# Patient Record
Sex: MALE | Race: ASIAN | NOT HISPANIC OR LATINO | Employment: FULL TIME | URBAN - METROPOLITAN AREA
[De-identification: names, ages, dates, MRNs, and addresses within clinical notes are randomized per-mention and may not be internally consistent; named-entity substitution may affect disease eponyms.]

---

## 2018-05-17 ENCOUNTER — OFFICE VISIT - HEALTHEAST (OUTPATIENT)
Dept: FAMILY MEDICINE | Facility: CLINIC | Age: 33
End: 2018-05-17

## 2018-05-17 DIAGNOSIS — R81 GLUCOSURIA: ICD-10-CM

## 2018-05-17 DIAGNOSIS — R10.9 ABDOMINAL PAIN: ICD-10-CM

## 2018-05-17 DIAGNOSIS — N18.6 ESRD (END STAGE RENAL DISEASE) ON DIALYSIS (H): ICD-10-CM

## 2018-05-17 DIAGNOSIS — Z99.2 ESRD (END STAGE RENAL DISEASE) ON DIALYSIS (H): ICD-10-CM

## 2018-05-17 DIAGNOSIS — D48.5 NEOPLASM OF UNCERTAIN BEHAVIOR OF SKIN: ICD-10-CM

## 2018-05-17 LAB
ALBUMIN UR-MCNC: ABNORMAL MG/DL
APPEARANCE UR: CLEAR
BILIRUB UR QL STRIP: NEGATIVE
COLOR UR AUTO: YELLOW
ERYTHROCYTE [DISTWIDTH] IN BLOOD BY AUTOMATED COUNT: 14.6 % (ref 11–14.5)
GLUCOSE UR STRIP-MCNC: ABNORMAL MG/DL
HBA1C MFR BLD: 4.5 % (ref 3.5–6)
HCT VFR BLD AUTO: 42 % (ref 40–54)
HGB BLD-MCNC: 13.9 G/DL (ref 14–18)
HGB UR QL STRIP: ABNORMAL
KETONES UR STRIP-MCNC: NEGATIVE MG/DL
LDLC SERPL CALC-MCNC: 133 MG/DL
LEUKOCYTE ESTERASE UR QL STRIP: ABNORMAL
MCH RBC QN AUTO: 31.1 PG (ref 27–34)
MCHC RBC AUTO-ENTMCNC: 33 G/DL (ref 32–36)
MCV RBC AUTO: 94 FL (ref 80–100)
NITRATE UR QL: NEGATIVE
PH UR STRIP: >=9 [PH] (ref 5–8)
PLATELET # BLD AUTO: 218 THOU/UL (ref 140–440)
PMV BLD AUTO: 7 FL (ref 7–10)
RBC # BLD AUTO: 4.46 MILL/UL (ref 4.4–6.2)
SP GR UR STRIP: 1.01 (ref 1–1.03)
TSH SERPL DL<=0.005 MIU/L-ACNC: 2.3 UIU/ML (ref 0.3–5)
UROBILINOGEN UR STRIP-ACNC: ABNORMAL
WBC: 4.8 THOU/UL (ref 4–11)

## 2018-05-17 ASSESSMENT — MIFFLIN-ST. JEOR: SCORE: 1486.79

## 2018-05-18 ENCOUNTER — COMMUNICATION - HEALTHEAST (OUTPATIENT)
Dept: FAMILY MEDICINE | Facility: CLINIC | Age: 33
End: 2018-05-18

## 2018-05-18 LAB — BACTERIA SPEC CULT: NO GROWTH

## 2018-05-24 ENCOUNTER — AMBULATORY - HEALTHEAST (OUTPATIENT)
Dept: FAMILY MEDICINE | Facility: CLINIC | Age: 33
End: 2018-05-24

## 2018-05-24 DIAGNOSIS — D48.5 NEOPLASM OF UNCERTAIN BEHAVIOR OF SKIN OF FACE: ICD-10-CM

## 2018-05-24 DIAGNOSIS — R10.9 ABDOMINAL PAIN: ICD-10-CM

## 2018-05-24 ASSESSMENT — MIFFLIN-ST. JEOR: SCORE: 1489.03

## 2018-05-25 LAB
O+P STL MICRO: NORMAL

## 2018-05-28 LAB
H PYLORI AG STL QL IA: NORMAL
REPORT STATUS: NORMAL
SPECIMEN DESCRIPTION: NORMAL

## 2018-05-29 ENCOUNTER — COMMUNICATION - HEALTHEAST (OUTPATIENT)
Dept: FAMILY MEDICINE | Facility: CLINIC | Age: 33
End: 2018-05-29

## 2018-05-31 LAB
LAB AP CHARGES (HE HISTORICAL CONVERSION): NORMAL
PATH REPORT.COMMENTS IMP SPEC: NORMAL
PATH REPORT.COMMENTS IMP SPEC: NORMAL
PATH REPORT.FINAL DX SPEC: NORMAL
PATH REPORT.GROSS SPEC: NORMAL
PATH REPORT.MICROSCOPIC SPEC OTHER STN: NORMAL
PATH REPORT.RELEVANT HX SPEC: NORMAL
RESULT FLAG (HE HISTORICAL CONVERSION): NORMAL

## 2018-06-04 ENCOUNTER — COMMUNICATION - HEALTHEAST (OUTPATIENT)
Dept: FAMILY MEDICINE | Facility: CLINIC | Age: 33
End: 2018-06-04

## 2018-06-12 ENCOUNTER — RECORDS - HEALTHEAST (OUTPATIENT)
Dept: ADMINISTRATIVE | Facility: OTHER | Age: 33
End: 2018-06-12

## 2018-07-11 ENCOUNTER — RECORDS - HEALTHEAST (OUTPATIENT)
Dept: ADMINISTRATIVE | Facility: OTHER | Age: 33
End: 2018-07-11

## 2018-07-12 ENCOUNTER — HOSPITAL ENCOUNTER (OUTPATIENT)
Dept: INTERVENTIONAL RADIOLOGY/VASCULAR | Facility: HOSPITAL | Age: 33
Discharge: HOME | End: 2018-07-12
Attending: INTERNAL MEDICINE | Admitting: RADIOLOGY

## 2018-07-12 DIAGNOSIS — N18.9 CHRONIC RENAL FAILURE: ICD-10-CM

## 2018-07-12 LAB
HGB BLD-MCNC: 12.5 G/DL (ref 14–18)
PLATELET # BLD AUTO: NORMAL THOU/UL (ref 140–440)
POTASSIUM BLD-SCNC: 5.9 MMOL/L (ref 3.5–5)

## 2018-07-12 ASSESSMENT — MIFFLIN-ST. JEOR: SCORE: 1471.04

## 2018-07-31 ENCOUNTER — RECORDS - HEALTHEAST (OUTPATIENT)
Dept: ADMINISTRATIVE | Facility: OTHER | Age: 33
End: 2018-07-31

## 2018-09-12 ENCOUNTER — COMMUNICATION - HEALTHEAST (OUTPATIENT)
Dept: FAMILY MEDICINE | Facility: CLINIC | Age: 33
End: 2018-09-12

## 2018-09-21 ENCOUNTER — COMMUNICATION - HEALTHEAST (OUTPATIENT)
Dept: SCHEDULING | Facility: CLINIC | Age: 33
End: 2018-09-21

## 2018-09-21 ENCOUNTER — OFFICE VISIT - HEALTHEAST (OUTPATIENT)
Dept: FAMILY MEDICINE | Facility: CLINIC | Age: 33
End: 2018-09-21

## 2018-09-21 DIAGNOSIS — R14.0 BLOATING: ICD-10-CM

## 2018-09-21 DIAGNOSIS — R10.13 EPIGASTRIC PAIN: ICD-10-CM

## 2018-09-21 DIAGNOSIS — Z23 IMMUNIZATION DUE: ICD-10-CM

## 2018-09-21 DIAGNOSIS — R22.1 MASS OF NECK: ICD-10-CM

## 2018-09-21 DIAGNOSIS — Z99.2 ESRD (END STAGE RENAL DISEASE) ON DIALYSIS (H): ICD-10-CM

## 2018-09-21 DIAGNOSIS — N18.6 ESRD (END STAGE RENAL DISEASE) ON DIALYSIS (H): ICD-10-CM

## 2018-09-21 LAB
ANION GAP SERPL CALCULATED.3IONS-SCNC: 14 MMOL/L (ref 5–18)
BUN SERPL-MCNC: 28 MG/DL (ref 8–22)
CALCIUM SERPL-MCNC: 10.9 MG/DL (ref 8.5–10.5)
CHLORIDE BLD-SCNC: 98 MMOL/L (ref 98–107)
CO2 SERPL-SCNC: 25 MMOL/L (ref 22–31)
CREAT SERPL-MCNC: 12.22 MG/DL (ref 0.7–1.3)
GFR SERPL CREATININE-BSD FRML MDRD: 5 ML/MIN/1.73M2
GLUCOSE BLD-MCNC: 73 MG/DL (ref 70–125)
POTASSIUM BLD-SCNC: 5.3 MMOL/L (ref 3.5–5)
SODIUM SERPL-SCNC: 137 MMOL/L (ref 136–145)

## 2018-09-21 ASSESSMENT — MIFFLIN-ST. JEOR: SCORE: 1495.99

## 2019-02-19 ENCOUNTER — RECORDS - HEALTHEAST (OUTPATIENT)
Dept: ADMINISTRATIVE | Facility: OTHER | Age: 34
End: 2019-02-19

## 2019-02-21 ENCOUNTER — HOSPITAL ENCOUNTER (OUTPATIENT)
Dept: RADIOLOGY | Facility: HOSPITAL | Age: 34
Discharge: HOME | End: 2019-02-21
Attending: INTERNAL MEDICINE

## 2019-02-21 DIAGNOSIS — R04.2 HEMOPTYSIS: ICD-10-CM

## 2019-02-21 DIAGNOSIS — N18.6 END STAGE RENAL DISEASE (H): ICD-10-CM

## 2019-03-06 ENCOUNTER — OFFICE VISIT - HEALTHEAST (OUTPATIENT)
Dept: FAMILY MEDICINE | Facility: CLINIC | Age: 34
End: 2019-03-06

## 2019-03-06 DIAGNOSIS — F32.9 MAJOR DEPRESSIVE DISORDER WITH CURRENT ACTIVE EPISODE, UNSPECIFIED DEPRESSION EPISODE SEVERITY, UNSPECIFIED WHETHER RECURRENT: ICD-10-CM

## 2019-03-06 DIAGNOSIS — K59.01 SLOW TRANSIT CONSTIPATION: ICD-10-CM

## 2019-03-06 DIAGNOSIS — R14.0 BLOATING: ICD-10-CM

## 2019-03-06 ASSESSMENT — MIFFLIN-ST. JEOR: SCORE: 1485.78

## 2019-03-08 ENCOUNTER — COMMUNICATION - HEALTHEAST (OUTPATIENT)
Dept: FAMILY MEDICINE | Facility: CLINIC | Age: 34
End: 2019-03-08

## 2019-03-25 ENCOUNTER — OFFICE VISIT - HEALTHEAST (OUTPATIENT)
Dept: BEHAVIORAL HEALTH | Facility: CLINIC | Age: 34
End: 2019-03-25

## 2019-03-25 DIAGNOSIS — F32.9 MAJOR DEPRESSIVE DISORDER WITH CURRENT ACTIVE EPISODE, UNSPECIFIED DEPRESSION EPISODE SEVERITY, UNSPECIFIED WHETHER RECURRENT: ICD-10-CM

## 2019-03-25 DIAGNOSIS — F41.1 GAD (GENERALIZED ANXIETY DISORDER): ICD-10-CM

## 2019-04-08 ENCOUNTER — OFFICE VISIT - HEALTHEAST (OUTPATIENT)
Dept: BEHAVIORAL HEALTH | Facility: CLINIC | Age: 34
End: 2019-04-08

## 2019-04-08 DIAGNOSIS — F33.9 MAJOR DEPRESSIVE DISORDER, RECURRENT EPISODE (H): ICD-10-CM

## 2019-04-08 DIAGNOSIS — F41.1 GAD (GENERALIZED ANXIETY DISORDER): ICD-10-CM

## 2019-04-19 ENCOUNTER — RECORDS - HEALTHEAST (OUTPATIENT)
Dept: LAB | Facility: CLINIC | Age: 34
End: 2019-04-19

## 2019-04-19 LAB — POTASSIUM BLD-SCNC: 5.1 MMOL/L (ref 3.5–5)

## 2019-04-22 ENCOUNTER — OFFICE VISIT - HEALTHEAST (OUTPATIENT)
Dept: BEHAVIORAL HEALTH | Facility: CLINIC | Age: 34
End: 2019-04-22

## 2019-04-22 DIAGNOSIS — F33.2 SEVERE EPISODE OF RECURRENT MAJOR DEPRESSIVE DISORDER, WITHOUT PSYCHOTIC FEATURES (H): ICD-10-CM

## 2019-04-22 DIAGNOSIS — F33.9 MAJOR DEPRESSIVE DISORDER, RECURRENT EPISODE (H): ICD-10-CM

## 2019-04-25 ENCOUNTER — AMBULATORY - HEALTHEAST (OUTPATIENT)
Dept: CARE COORDINATION | Facility: CLINIC | Age: 34
End: 2019-04-25

## 2019-04-25 ENCOUNTER — COMMUNICATION - HEALTHEAST (OUTPATIENT)
Dept: NURSING | Facility: CLINIC | Age: 34
End: 2019-04-25

## 2019-04-25 DIAGNOSIS — Z99.2 ESRD (END STAGE RENAL DISEASE) ON DIALYSIS (H): ICD-10-CM

## 2019-04-25 DIAGNOSIS — N18.6 ESRD (END STAGE RENAL DISEASE) ON DIALYSIS (H): ICD-10-CM

## 2019-05-14 ENCOUNTER — AMBULATORY - HEALTHEAST (OUTPATIENT)
Dept: BEHAVIORAL HEALTH | Facility: CLINIC | Age: 34
End: 2019-05-14

## 2019-05-14 ENCOUNTER — OFFICE VISIT - HEALTHEAST (OUTPATIENT)
Dept: BEHAVIORAL HEALTH | Facility: CLINIC | Age: 34
End: 2019-05-14

## 2019-05-14 ENCOUNTER — AMBULATORY - HEALTHEAST (OUTPATIENT)
Dept: NURSING | Facility: CLINIC | Age: 34
End: 2019-05-14

## 2019-05-14 DIAGNOSIS — F33.2 SEVERE EPISODE OF RECURRENT MAJOR DEPRESSIVE DISORDER, WITHOUT PSYCHOTIC FEATURES (H): ICD-10-CM

## 2019-05-23 ENCOUNTER — COMMUNICATION - HEALTHEAST (OUTPATIENT)
Dept: NURSING | Facility: CLINIC | Age: 34
End: 2019-05-23

## 2019-05-24 ENCOUNTER — AMBULATORY - HEALTHEAST (OUTPATIENT)
Dept: NURSING | Facility: CLINIC | Age: 34
End: 2019-05-24

## 2019-05-28 ENCOUNTER — OFFICE VISIT - HEALTHEAST (OUTPATIENT)
Dept: BEHAVIORAL HEALTH | Facility: CLINIC | Age: 34
End: 2019-05-28

## 2019-05-28 DIAGNOSIS — F33.9 MAJOR DEPRESSIVE DISORDER, RECURRENT EPISODE (H): ICD-10-CM

## 2019-05-28 DIAGNOSIS — F33.0 MILD EPISODE OF RECURRENT MAJOR DEPRESSIVE DISORDER (H): ICD-10-CM

## 2019-05-29 ENCOUNTER — COMMUNICATION - HEALTHEAST (OUTPATIENT)
Dept: NURSING | Facility: CLINIC | Age: 34
End: 2019-05-29

## 2019-06-18 ENCOUNTER — AMBULATORY - HEALTHEAST (OUTPATIENT)
Dept: NURSING | Facility: CLINIC | Age: 34
End: 2019-06-18

## 2019-06-24 ENCOUNTER — COMMUNICATION - HEALTHEAST (OUTPATIENT)
Dept: NURSING | Facility: CLINIC | Age: 34
End: 2019-06-24

## 2019-07-02 ENCOUNTER — COMMUNICATION - HEALTHEAST (OUTPATIENT)
Dept: NURSING | Facility: CLINIC | Age: 34
End: 2019-07-02

## 2019-07-11 ENCOUNTER — COMMUNICATION - HEALTHEAST (OUTPATIENT)
Dept: NURSING | Facility: CLINIC | Age: 34
End: 2019-07-11

## 2019-08-08 ENCOUNTER — COMMUNICATION - HEALTHEAST (OUTPATIENT)
Dept: NURSING | Facility: CLINIC | Age: 34
End: 2019-08-08

## 2019-08-09 ENCOUNTER — COMMUNICATION - HEALTHEAST (OUTPATIENT)
Dept: NURSING | Facility: CLINIC | Age: 34
End: 2019-08-09

## 2019-08-15 ENCOUNTER — RECORDS - HEALTHEAST (OUTPATIENT)
Dept: GENERAL RADIOLOGY | Facility: CLINIC | Age: 34
End: 2019-08-15

## 2019-08-15 ENCOUNTER — OFFICE VISIT - HEALTHEAST (OUTPATIENT)
Dept: FAMILY MEDICINE | Facility: CLINIC | Age: 34
End: 2019-08-15

## 2019-08-15 DIAGNOSIS — Z99.2 ESRD (END STAGE RENAL DISEASE) ON DIALYSIS (H): ICD-10-CM

## 2019-08-15 DIAGNOSIS — R10.32 LLQ ABDOMINAL PAIN: ICD-10-CM

## 2019-08-15 DIAGNOSIS — R10.13 EPIGASTRIC PAIN: ICD-10-CM

## 2019-08-15 DIAGNOSIS — Z75.8 LANGUAGE BARRIER AFFECTING HEALTH CARE: ICD-10-CM

## 2019-08-15 DIAGNOSIS — K59.01 SLOW TRANSIT CONSTIPATION: ICD-10-CM

## 2019-08-15 DIAGNOSIS — I10 ESSENTIAL HYPERTENSION: ICD-10-CM

## 2019-08-15 DIAGNOSIS — R10.32 LEFT LOWER QUADRANT PAIN: ICD-10-CM

## 2019-08-15 DIAGNOSIS — Z79.899 POLYPHARMACY: ICD-10-CM

## 2019-08-15 DIAGNOSIS — R61 NIGHT SWEATS: ICD-10-CM

## 2019-08-15 DIAGNOSIS — K21.9 GASTROESOPHAGEAL REFLUX DISEASE WITHOUT ESOPHAGITIS: ICD-10-CM

## 2019-08-15 DIAGNOSIS — N18.6 ESRD (END STAGE RENAL DISEASE) ON DIALYSIS (H): ICD-10-CM

## 2019-08-15 DIAGNOSIS — R61 GENERALIZED HYPERHIDROSIS: ICD-10-CM

## 2019-08-15 DIAGNOSIS — R04.2 COUGH WITH HEMOPTYSIS: ICD-10-CM

## 2019-08-15 DIAGNOSIS — R04.2 HEMOPTYSIS: ICD-10-CM

## 2019-08-15 DIAGNOSIS — Z60.3 LANGUAGE BARRIER AFFECTING HEALTH CARE: ICD-10-CM

## 2019-08-15 LAB
BASOPHILS # BLD AUTO: 0 THOU/UL (ref 0–0.2)
BASOPHILS NFR BLD AUTO: 0 % (ref 0–2)
EOSINOPHIL # BLD AUTO: 0.2 THOU/UL (ref 0–0.4)
EOSINOPHIL NFR BLD AUTO: 4 % (ref 0–6)
ERYTHROCYTE [DISTWIDTH] IN BLOOD BY AUTOMATED COUNT: 13.8 % (ref 11–14.5)
HCT VFR BLD AUTO: 34 % (ref 40–54)
HGB BLD-MCNC: 11.5 G/DL (ref 14–18)
LYMPHOCYTES # BLD AUTO: 1.6 THOU/UL (ref 0.8–4.4)
LYMPHOCYTES NFR BLD AUTO: 28 % (ref 20–40)
MCH RBC QN AUTO: 30 PG (ref 27–34)
MCHC RBC AUTO-ENTMCNC: 33.8 G/DL (ref 32–36)
MCV RBC AUTO: 89 FL (ref 80–100)
MONOCYTES # BLD AUTO: 0.3 THOU/UL (ref 0–0.9)
MONOCYTES NFR BLD AUTO: 5 % (ref 2–10)
NEUTROPHILS # BLD AUTO: 3.5 THOU/UL (ref 2–7.7)
NEUTROPHILS NFR BLD AUTO: 63 % (ref 50–70)
PLATELET # BLD AUTO: 139 THOU/UL (ref 140–440)
PMV BLD AUTO: 7 FL (ref 7–10)
RBC # BLD AUTO: 3.84 MILL/UL (ref 4.4–6.2)
WBC: 5.5 THOU/UL (ref 4–11)

## 2019-08-15 SDOH — SOCIAL STABILITY - SOCIAL INSECURITY: ACCULTURATION DIFFICULTY: Z60.3

## 2019-08-15 ASSESSMENT — MIFFLIN-ST. JEOR: SCORE: 1445.77

## 2019-08-19 LAB
GAMMA INTERFERON BACKGROUND BLD IA-ACNC: 0.13 IU/ML
M TB IFN-G BLD-IMP: NEGATIVE
MITOGEN IGNF BCKGRD COR BLD-ACNC: -0.03 IU/ML
MITOGEN IGNF BCKGRD COR BLD-ACNC: 0 IU/ML
QTF INTERPRETATION: NORMAL
QTF MITOGEN - NIL: 9.19 IU/ML

## 2019-08-21 ENCOUNTER — COMMUNICATION - HEALTHEAST (OUTPATIENT)
Dept: CARE COORDINATION | Facility: CLINIC | Age: 34
End: 2019-08-21

## 2019-08-22 ENCOUNTER — COMMUNICATION - HEALTHEAST (OUTPATIENT)
Dept: CARE COORDINATION | Facility: CLINIC | Age: 34
End: 2019-08-22

## 2019-08-22 ENCOUNTER — COMMUNICATION - HEALTHEAST (OUTPATIENT)
Dept: NURSING | Facility: CLINIC | Age: 34
End: 2019-08-22

## 2019-08-23 ENCOUNTER — COMMUNICATION - HEALTHEAST (OUTPATIENT)
Dept: NURSING | Facility: CLINIC | Age: 34
End: 2019-08-23

## 2019-08-28 ENCOUNTER — RECORDS - HEALTHEAST (OUTPATIENT)
Dept: ADMINISTRATIVE | Facility: OTHER | Age: 34
End: 2019-08-28

## 2019-08-29 ENCOUNTER — OFFICE VISIT - HEALTHEAST (OUTPATIENT)
Dept: FAMILY MEDICINE | Facility: CLINIC | Age: 34
End: 2019-08-29

## 2019-08-29 ENCOUNTER — COMMUNICATION - HEALTHEAST (OUTPATIENT)
Dept: NURSING | Facility: CLINIC | Age: 34
End: 2019-08-29

## 2019-08-29 DIAGNOSIS — I16.0 HYPERTENSIVE URGENCY: ICD-10-CM

## 2019-08-29 DIAGNOSIS — I51.7 LVH (LEFT VENTRICULAR HYPERTROPHY): ICD-10-CM

## 2019-08-29 DIAGNOSIS — K21.9 GASTROESOPHAGEAL REFLUX DISEASE WITHOUT ESOPHAGITIS: ICD-10-CM

## 2019-08-29 DIAGNOSIS — K59.01 SLOW TRANSIT CONSTIPATION: ICD-10-CM

## 2019-08-29 DIAGNOSIS — Z99.2 ESRD (END STAGE RENAL DISEASE) ON DIALYSIS (H): ICD-10-CM

## 2019-08-29 DIAGNOSIS — N18.6 ESRD (END STAGE RENAL DISEASE) ON DIALYSIS (H): ICD-10-CM

## 2019-08-29 ASSESSMENT — MIFFLIN-ST. JEOR: SCORE: 1437.02

## 2019-09-04 ENCOUNTER — AMBULATORY - HEALTHEAST (OUTPATIENT)
Dept: NURSING | Facility: CLINIC | Age: 34
End: 2019-09-04

## 2019-09-04 ENCOUNTER — AMBULATORY - HEALTHEAST (OUTPATIENT)
Dept: FAMILY MEDICINE | Facility: CLINIC | Age: 34
End: 2019-09-04

## 2019-09-04 ENCOUNTER — COMMUNICATION - HEALTHEAST (OUTPATIENT)
Dept: NURSING | Facility: CLINIC | Age: 34
End: 2019-09-04

## 2019-09-04 DIAGNOSIS — K21.9 GASTROESOPHAGEAL REFLUX DISEASE, ESOPHAGITIS PRESENCE NOT SPECIFIED: ICD-10-CM

## 2019-09-04 RX ORDER — CALCIUM CARBONATE 500 MG/1
2 TABLET, CHEWABLE ORAL 2 TIMES DAILY PRN
Qty: 120 TABLET | Refills: 11 | Status: SHIPPED | OUTPATIENT
Start: 2019-09-04

## 2019-09-04 ASSESSMENT — ACTIVITIES OF DAILY LIVING (ADL): DEPENDENT_IADLS:: INDEPENDENT

## 2019-09-05 ENCOUNTER — COMMUNICATION - HEALTHEAST (OUTPATIENT)
Dept: NURSING | Facility: CLINIC | Age: 34
End: 2019-09-05

## 2019-09-10 ENCOUNTER — AMBULATORY - HEALTHEAST (OUTPATIENT)
Dept: NURSING | Facility: CLINIC | Age: 34
End: 2019-09-10

## 2019-09-13 ENCOUNTER — COMMUNICATION - HEALTHEAST (OUTPATIENT)
Dept: CARE COORDINATION | Facility: CLINIC | Age: 34
End: 2019-09-13

## 2019-09-13 ENCOUNTER — HOME CARE/HOSPICE - HEALTHEAST (OUTPATIENT)
Dept: HOME HEALTH SERVICES | Facility: HOME HEALTH | Age: 34
End: 2019-09-13

## 2019-09-13 DIAGNOSIS — N18.6 ESRD (END STAGE RENAL DISEASE) ON DIALYSIS (H): ICD-10-CM

## 2019-09-13 DIAGNOSIS — I10 ESSENTIAL HYPERTENSION: ICD-10-CM

## 2019-09-13 DIAGNOSIS — Z99.2 ESRD (END STAGE RENAL DISEASE) ON DIALYSIS (H): ICD-10-CM

## 2019-09-13 DIAGNOSIS — I16.0 HYPERTENSIVE URGENCY: ICD-10-CM

## 2019-09-17 ENCOUNTER — COMMUNICATION - HEALTHEAST (OUTPATIENT)
Dept: HOME HEALTH SERVICES | Facility: HOME HEALTH | Age: 34
End: 2019-09-17

## 2019-09-17 ENCOUNTER — HOME CARE/HOSPICE - HEALTHEAST (OUTPATIENT)
Dept: HOME HEALTH SERVICES | Facility: HOME HEALTH | Age: 34
End: 2019-09-17

## 2019-09-17 ENCOUNTER — AMBULATORY - HEALTHEAST (OUTPATIENT)
Dept: FAMILY MEDICINE | Facility: CLINIC | Age: 34
End: 2019-09-17

## 2019-09-20 ENCOUNTER — HOME CARE/HOSPICE - HEALTHEAST (OUTPATIENT)
Dept: HOME HEALTH SERVICES | Facility: HOME HEALTH | Age: 34
End: 2019-09-20

## 2019-09-24 ENCOUNTER — AMBULATORY - HEALTHEAST (OUTPATIENT)
Dept: CARE COORDINATION | Facility: CLINIC | Age: 34
End: 2019-09-24

## 2019-09-24 ENCOUNTER — HOME CARE/HOSPICE - HEALTHEAST (OUTPATIENT)
Dept: HOME HEALTH SERVICES | Facility: HOME HEALTH | Age: 34
End: 2019-09-24

## 2019-09-24 DIAGNOSIS — Z71.89 COUNSELING AND COORDINATION OF CARE: ICD-10-CM

## 2019-09-27 ENCOUNTER — HOME CARE/HOSPICE - HEALTHEAST (OUTPATIENT)
Dept: HOME HEALTH SERVICES | Facility: HOME HEALTH | Age: 34
End: 2019-09-27

## 2019-10-01 ENCOUNTER — COMMUNICATION - HEALTHEAST (OUTPATIENT)
Dept: NURSING | Facility: CLINIC | Age: 34
End: 2019-10-01

## 2019-10-02 ENCOUNTER — COMMUNICATION - HEALTHEAST (OUTPATIENT)
Dept: NURSING | Facility: CLINIC | Age: 34
End: 2019-10-02

## 2019-10-04 ENCOUNTER — HOME CARE/HOSPICE - HEALTHEAST (OUTPATIENT)
Dept: HOME HEALTH SERVICES | Facility: HOME HEALTH | Age: 34
End: 2019-10-04

## 2019-10-09 ENCOUNTER — COMMUNICATION - HEALTHEAST (OUTPATIENT)
Dept: NURSING | Facility: CLINIC | Age: 34
End: 2019-10-09

## 2019-10-11 ENCOUNTER — HOME CARE/HOSPICE - HEALTHEAST (OUTPATIENT)
Dept: HOME HEALTH SERVICES | Facility: HOME HEALTH | Age: 34
End: 2019-10-11

## 2019-10-14 ENCOUNTER — AMBULATORY - HEALTHEAST (OUTPATIENT)
Dept: NURSING | Facility: CLINIC | Age: 34
End: 2019-10-14

## 2019-10-18 ENCOUNTER — HOME CARE/HOSPICE - HEALTHEAST (OUTPATIENT)
Dept: HOME HEALTH SERVICES | Facility: HOME HEALTH | Age: 34
End: 2019-10-18

## 2019-10-22 ENCOUNTER — HOME CARE/HOSPICE - HEALTHEAST (OUTPATIENT)
Dept: HOME HEALTH SERVICES | Facility: HOME HEALTH | Age: 34
End: 2019-10-22

## 2019-10-22 ENCOUNTER — COMMUNICATION - HEALTHEAST (OUTPATIENT)
Dept: HOME HEALTH SERVICES | Facility: HOME HEALTH | Age: 34
End: 2019-10-22

## 2019-10-28 ENCOUNTER — OFFICE VISIT - HEALTHEAST (OUTPATIENT)
Dept: FAMILY MEDICINE | Facility: CLINIC | Age: 34
End: 2019-10-28

## 2019-10-28 DIAGNOSIS — Z02.71 DISABILITY EXAMINATION: ICD-10-CM

## 2019-10-28 DIAGNOSIS — Z13.31 SCREENING FOR DEPRESSION: ICD-10-CM

## 2019-10-28 ASSESSMENT — PATIENT HEALTH QUESTIONNAIRE - PHQ9: SUM OF ALL RESPONSES TO PHQ QUESTIONS 1-9: 4

## 2019-10-28 ASSESSMENT — MIFFLIN-ST. JEOR: SCORE: 1448.36

## 2019-10-29 ENCOUNTER — HOME CARE/HOSPICE - HEALTHEAST (OUTPATIENT)
Dept: HOME HEALTH SERVICES | Facility: HOME HEALTH | Age: 34
End: 2019-10-29

## 2019-11-01 ENCOUNTER — COMMUNICATION - HEALTHEAST (OUTPATIENT)
Dept: NURSING | Facility: CLINIC | Age: 34
End: 2019-11-01

## 2019-11-05 ENCOUNTER — COMMUNICATION - HEALTHEAST (OUTPATIENT)
Dept: NURSING | Facility: CLINIC | Age: 34
End: 2019-11-05

## 2019-11-05 ENCOUNTER — HOME CARE/HOSPICE - HEALTHEAST (OUTPATIENT)
Dept: HOME HEALTH SERVICES | Facility: HOME HEALTH | Age: 34
End: 2019-11-05

## 2019-11-12 ENCOUNTER — HOME CARE/HOSPICE - HEALTHEAST (OUTPATIENT)
Dept: HOME HEALTH SERVICES | Facility: HOME HEALTH | Age: 34
End: 2019-11-12

## 2019-11-13 ENCOUNTER — HOME CARE/HOSPICE - HEALTHEAST (OUTPATIENT)
Dept: HOME HEALTH SERVICES | Facility: HOME HEALTH | Age: 34
End: 2019-11-13

## 2019-11-13 ENCOUNTER — COMMUNICATION - HEALTHEAST (OUTPATIENT)
Dept: HOME HEALTH SERVICES | Facility: HOME HEALTH | Age: 34
End: 2019-11-13

## 2019-11-14 ENCOUNTER — AMBULATORY - HEALTHEAST (OUTPATIENT)
Dept: FAMILY MEDICINE | Facility: CLINIC | Age: 34
End: 2019-11-14

## 2019-11-14 ENCOUNTER — HOME CARE/HOSPICE - HEALTHEAST (OUTPATIENT)
Dept: HOME HEALTH SERVICES | Facility: HOME HEALTH | Age: 34
End: 2019-11-14

## 2019-11-19 ENCOUNTER — HOME CARE/HOSPICE - HEALTHEAST (OUTPATIENT)
Dept: HOME HEALTH SERVICES | Facility: HOME HEALTH | Age: 34
End: 2019-11-19

## 2019-11-21 ENCOUNTER — COMMUNICATION - HEALTHEAST (OUTPATIENT)
Dept: FAMILY MEDICINE | Facility: CLINIC | Age: 34
End: 2019-11-21

## 2019-11-22 ENCOUNTER — COMMUNICATION - HEALTHEAST (OUTPATIENT)
Dept: NURSING | Facility: CLINIC | Age: 34
End: 2019-11-22

## 2019-11-22 ENCOUNTER — HOME CARE/HOSPICE - HEALTHEAST (OUTPATIENT)
Dept: HOME HEALTH SERVICES | Facility: HOME HEALTH | Age: 34
End: 2019-11-22

## 2019-11-25 ENCOUNTER — AMBULATORY - HEALTHEAST (OUTPATIENT)
Dept: NURSING | Facility: CLINIC | Age: 34
End: 2019-11-25

## 2019-11-25 ENCOUNTER — COMMUNICATION - HEALTHEAST (OUTPATIENT)
Dept: CARE COORDINATION | Facility: CLINIC | Age: 34
End: 2019-11-25

## 2019-11-26 ENCOUNTER — COMMUNICATION - HEALTHEAST (OUTPATIENT)
Dept: CARE COORDINATION | Facility: CLINIC | Age: 34
End: 2019-11-26

## 2019-11-26 ENCOUNTER — HOME CARE/HOSPICE - HEALTHEAST (OUTPATIENT)
Dept: HOME HEALTH SERVICES | Facility: HOME HEALTH | Age: 34
End: 2019-11-26

## 2019-11-27 ENCOUNTER — COMMUNICATION - HEALTHEAST (OUTPATIENT)
Dept: FAMILY MEDICINE | Facility: CLINIC | Age: 34
End: 2019-11-27

## 2019-12-02 ENCOUNTER — HOME CARE/HOSPICE - HEALTHEAST (OUTPATIENT)
Dept: HOME HEALTH SERVICES | Facility: HOME HEALTH | Age: 34
End: 2019-12-02

## 2019-12-03 ENCOUNTER — HOME CARE/HOSPICE - HEALTHEAST (OUTPATIENT)
Dept: HOME HEALTH SERVICES | Facility: HOME HEALTH | Age: 34
End: 2019-12-03

## 2019-12-03 ENCOUNTER — AMBULATORY - HEALTHEAST (OUTPATIENT)
Dept: FAMILY MEDICINE | Facility: CLINIC | Age: 34
End: 2019-12-03

## 2019-12-03 DIAGNOSIS — K21.9 GASTROESOPHAGEAL REFLUX DISEASE, ESOPHAGITIS PRESENCE NOT SPECIFIED: ICD-10-CM

## 2019-12-10 ENCOUNTER — HOME CARE/HOSPICE - HEALTHEAST (OUTPATIENT)
Dept: HOME HEALTH SERVICES | Facility: HOME HEALTH | Age: 34
End: 2019-12-10

## 2019-12-16 ENCOUNTER — AMBULATORY - HEALTHEAST (OUTPATIENT)
Dept: NURSING | Facility: CLINIC | Age: 34
End: 2019-12-16

## 2019-12-17 ENCOUNTER — HOME CARE/HOSPICE - HEALTHEAST (OUTPATIENT)
Dept: HOME HEALTH SERVICES | Facility: HOME HEALTH | Age: 34
End: 2019-12-17

## 2019-12-24 ENCOUNTER — HOME CARE/HOSPICE - HEALTHEAST (OUTPATIENT)
Dept: HOME HEALTH SERVICES | Facility: HOME HEALTH | Age: 34
End: 2019-12-24

## 2019-12-27 ENCOUNTER — COMMUNICATION - HEALTHEAST (OUTPATIENT)
Dept: NURSING | Facility: CLINIC | Age: 34
End: 2019-12-27

## 2019-12-31 ENCOUNTER — HOME CARE/HOSPICE - HEALTHEAST (OUTPATIENT)
Dept: HOME HEALTH SERVICES | Facility: HOME HEALTH | Age: 34
End: 2019-12-31

## 2020-01-02 ENCOUNTER — HOME CARE/HOSPICE - HEALTHEAST (OUTPATIENT)
Dept: HOME HEALTH SERVICES | Facility: HOME HEALTH | Age: 35
End: 2020-01-02

## 2020-01-07 ENCOUNTER — HOME CARE/HOSPICE - HEALTHEAST (OUTPATIENT)
Dept: HOME HEALTH SERVICES | Facility: HOME HEALTH | Age: 35
End: 2020-01-07

## 2020-01-14 ENCOUNTER — HOME CARE/HOSPICE - HEALTHEAST (OUTPATIENT)
Dept: HOME HEALTH SERVICES | Facility: HOME HEALTH | Age: 35
End: 2020-01-14

## 2020-01-22 ENCOUNTER — AMBULATORY - HEALTHEAST (OUTPATIENT)
Dept: BEHAVIORAL HEALTH | Facility: CLINIC | Age: 35
End: 2020-01-22

## 2020-01-27 ENCOUNTER — COMMUNICATION - HEALTHEAST (OUTPATIENT)
Dept: NURSING | Facility: CLINIC | Age: 35
End: 2020-01-27

## 2020-01-28 ENCOUNTER — COMMUNICATION - HEALTHEAST (OUTPATIENT)
Dept: HOME HEALTH SERVICES | Facility: HOME HEALTH | Age: 35
End: 2020-01-28

## 2020-01-30 ENCOUNTER — HOME CARE/HOSPICE - HEALTHEAST (OUTPATIENT)
Dept: HOME HEALTH SERVICES | Facility: HOME HEALTH | Age: 35
End: 2020-01-30

## 2020-01-30 ENCOUNTER — COMMUNICATION - HEALTHEAST (OUTPATIENT)
Dept: HOME HEALTH SERVICES | Facility: HOME HEALTH | Age: 35
End: 2020-01-30

## 2020-02-03 ENCOUNTER — COMMUNICATION - HEALTHEAST (OUTPATIENT)
Dept: NURSING | Facility: CLINIC | Age: 35
End: 2020-02-03

## 2020-02-03 ENCOUNTER — AMBULATORY - HEALTHEAST (OUTPATIENT)
Dept: MULTI SPECIALTY CLINIC | Facility: CLINIC | Age: 35
End: 2020-02-03

## 2020-02-03 ENCOUNTER — HOME CARE/HOSPICE - HEALTHEAST (OUTPATIENT)
Dept: HOME HEALTH SERVICES | Facility: HOME HEALTH | Age: 35
End: 2020-02-03

## 2020-02-03 DIAGNOSIS — I10 ESSENTIAL HYPERTENSION: ICD-10-CM

## 2020-02-05 ENCOUNTER — HOME CARE/HOSPICE - HEALTHEAST (OUTPATIENT)
Dept: HOME HEALTH SERVICES | Facility: HOME HEALTH | Age: 35
End: 2020-02-05

## 2020-02-13 ENCOUNTER — HOME CARE/HOSPICE - HEALTHEAST (OUTPATIENT)
Dept: HOME HEALTH SERVICES | Facility: HOME HEALTH | Age: 35
End: 2020-02-13

## 2020-02-17 ENCOUNTER — HOME CARE/HOSPICE - HEALTHEAST (OUTPATIENT)
Dept: HOME HEALTH SERVICES | Facility: HOME HEALTH | Age: 35
End: 2020-02-17

## 2020-02-18 ENCOUNTER — HOME CARE/HOSPICE - HEALTHEAST (OUTPATIENT)
Dept: HOME HEALTH SERVICES | Facility: HOME HEALTH | Age: 35
End: 2020-02-18

## 2020-02-19 ENCOUNTER — RECORDS - HEALTHEAST (OUTPATIENT)
Dept: ADMINISTRATIVE | Facility: OTHER | Age: 35
End: 2020-02-19

## 2020-02-20 ENCOUNTER — HOME CARE/HOSPICE - HEALTHEAST (OUTPATIENT)
Dept: HOME HEALTH SERVICES | Facility: HOME HEALTH | Age: 35
End: 2020-02-20

## 2020-02-25 ENCOUNTER — HOSPITAL ENCOUNTER (OUTPATIENT)
Dept: ULTRASOUND IMAGING | Facility: CLINIC | Age: 35
Discharge: HOME | End: 2020-02-25
Attending: INTERNAL MEDICINE

## 2020-02-25 DIAGNOSIS — N18.6 ESRD (END STAGE RENAL DISEASE) (H): ICD-10-CM

## 2020-02-27 ENCOUNTER — HOME CARE/HOSPICE - HEALTHEAST (OUTPATIENT)
Dept: HOME HEALTH SERVICES | Facility: HOME HEALTH | Age: 35
End: 2020-02-27

## 2020-03-02 ENCOUNTER — COMMUNICATION - HEALTHEAST (OUTPATIENT)
Dept: NURSING | Facility: CLINIC | Age: 35
End: 2020-03-02

## 2020-03-05 ENCOUNTER — HOME CARE/HOSPICE - HEALTHEAST (OUTPATIENT)
Dept: HOME HEALTH SERVICES | Facility: HOME HEALTH | Age: 35
End: 2020-03-05

## 2020-03-05 ENCOUNTER — COMMUNICATION - HEALTHEAST (OUTPATIENT)
Dept: CARE COORDINATION | Facility: CLINIC | Age: 35
End: 2020-03-05

## 2020-03-12 ENCOUNTER — HOME CARE/HOSPICE - HEALTHEAST (OUTPATIENT)
Dept: HOME HEALTH SERVICES | Facility: HOME HEALTH | Age: 35
End: 2020-03-12

## 2020-03-18 ENCOUNTER — RECORDS - HEALTHEAST (OUTPATIENT)
Dept: ADMINISTRATIVE | Facility: OTHER | Age: 35
End: 2020-03-18

## 2020-03-19 ENCOUNTER — RECORDS - HEALTHEAST (OUTPATIENT)
Dept: ADMINISTRATIVE | Facility: OTHER | Age: 35
End: 2020-03-19

## 2020-03-23 ENCOUNTER — RECORDS - HEALTHEAST (OUTPATIENT)
Dept: ADMINISTRATIVE | Facility: OTHER | Age: 35
End: 2020-03-23

## 2020-03-23 ENCOUNTER — ANESTHESIA - HEALTHEAST (OUTPATIENT)
Dept: SURGERY | Facility: CLINIC | Age: 35
End: 2020-03-23

## 2020-03-24 ENCOUNTER — SURGERY - HEALTHEAST (OUTPATIENT)
Dept: SURGERY | Facility: CLINIC | Age: 35
End: 2020-03-24

## 2020-03-25 ENCOUNTER — ANESTHESIA - HEALTHEAST (OUTPATIENT)
Dept: SURGERY | Facility: CLINIC | Age: 35
End: 2020-03-25

## 2020-03-26 ENCOUNTER — HOME CARE/HOSPICE - HEALTHEAST (OUTPATIENT)
Dept: HOME HEALTH SERVICES | Facility: HOME HEALTH | Age: 35
End: 2020-03-26

## 2020-03-26 ENCOUNTER — SURGERY - HEALTHEAST (OUTPATIENT)
Dept: SURGERY | Facility: CLINIC | Age: 35
End: 2020-03-26

## 2020-03-26 ASSESSMENT — MIFFLIN-ST. JEOR: SCORE: 1408.1

## 2020-03-27 ENCOUNTER — HOME CARE/HOSPICE - HEALTHEAST (OUTPATIENT)
Dept: HOME HEALTH SERVICES | Facility: HOME HEALTH | Age: 35
End: 2020-03-27

## 2020-03-31 ENCOUNTER — COMMUNICATION - HEALTHEAST (OUTPATIENT)
Dept: NURSING | Facility: CLINIC | Age: 35
End: 2020-03-31

## 2020-04-01 ENCOUNTER — COMMUNICATION - HEALTHEAST (OUTPATIENT)
Dept: NURSING | Facility: CLINIC | Age: 35
End: 2020-04-01

## 2020-04-09 ENCOUNTER — HOME CARE/HOSPICE - HEALTHEAST (OUTPATIENT)
Dept: HOME HEALTH SERVICES | Facility: HOME HEALTH | Age: 35
End: 2020-04-09

## 2020-04-13 ENCOUNTER — HOME CARE/HOSPICE - HEALTHEAST (OUTPATIENT)
Dept: HOME HEALTH SERVICES | Facility: HOME HEALTH | Age: 35
End: 2020-04-13

## 2020-04-13 ENCOUNTER — AMBULATORY - HEALTHEAST (OUTPATIENT)
Dept: SURGERY | Facility: CLINIC | Age: 35
End: 2020-04-13

## 2020-04-13 ENCOUNTER — COMMUNICATION - HEALTHEAST (OUTPATIENT)
Dept: NURSING | Facility: CLINIC | Age: 35
End: 2020-04-13

## 2020-04-20 ENCOUNTER — COMMUNICATION - HEALTHEAST (OUTPATIENT)
Dept: NURSING | Facility: CLINIC | Age: 35
End: 2020-04-20

## 2020-04-23 ENCOUNTER — HOME CARE/HOSPICE - HEALTHEAST (OUTPATIENT)
Dept: HOME HEALTH SERVICES | Facility: HOME HEALTH | Age: 35
End: 2020-04-23

## 2020-05-01 ENCOUNTER — COMMUNICATION - HEALTHEAST (OUTPATIENT)
Dept: NURSING | Facility: CLINIC | Age: 35
End: 2020-05-01

## 2020-05-05 ENCOUNTER — HOME CARE/HOSPICE - HEALTHEAST (OUTPATIENT)
Dept: HOME HEALTH SERVICES | Facility: HOME HEALTH | Age: 35
End: 2020-05-05

## 2020-05-05 ENCOUNTER — COMMUNICATION - HEALTHEAST (OUTPATIENT)
Dept: HOME HEALTH SERVICES | Facility: HOME HEALTH | Age: 35
End: 2020-05-05

## 2020-05-07 ENCOUNTER — HOME CARE/HOSPICE - HEALTHEAST (OUTPATIENT)
Dept: HOME HEALTH SERVICES | Facility: HOME HEALTH | Age: 35
End: 2020-05-07

## 2020-05-14 ENCOUNTER — COMMUNICATION - HEALTHEAST (OUTPATIENT)
Dept: HOME HEALTH SERVICES | Facility: HOME HEALTH | Age: 35
End: 2020-05-14

## 2020-05-14 ENCOUNTER — HOME CARE/HOSPICE - HEALTHEAST (OUTPATIENT)
Dept: HOME HEALTH SERVICES | Facility: HOME HEALTH | Age: 35
End: 2020-05-14

## 2020-05-19 ENCOUNTER — HOME CARE/HOSPICE - HEALTHEAST (OUTPATIENT)
Dept: HOME HEALTH SERVICES | Facility: HOME HEALTH | Age: 35
End: 2020-05-19

## 2020-05-21 ENCOUNTER — HOME CARE/HOSPICE - HEALTHEAST (OUTPATIENT)
Dept: HOME HEALTH SERVICES | Facility: HOME HEALTH | Age: 35
End: 2020-05-21

## 2020-05-28 ENCOUNTER — HOME CARE/HOSPICE - HEALTHEAST (OUTPATIENT)
Dept: HOME HEALTH SERVICES | Facility: HOME HEALTH | Age: 35
End: 2020-05-28

## 2020-05-29 ENCOUNTER — HOME CARE/HOSPICE - HEALTHEAST (OUTPATIENT)
Dept: HOME HEALTH SERVICES | Facility: HOME HEALTH | Age: 35
End: 2020-05-29

## 2020-06-01 ENCOUNTER — COMMUNICATION - HEALTHEAST (OUTPATIENT)
Dept: NURSING | Facility: CLINIC | Age: 35
End: 2020-06-01

## 2020-06-04 ENCOUNTER — HOME CARE/HOSPICE - HEALTHEAST (OUTPATIENT)
Dept: HOME HEALTH SERVICES | Facility: HOME HEALTH | Age: 35
End: 2020-06-04

## 2020-06-18 ENCOUNTER — HOME CARE/HOSPICE - HEALTHEAST (OUTPATIENT)
Dept: HOME HEALTH SERVICES | Facility: HOME HEALTH | Age: 35
End: 2020-06-18

## 2020-06-19 ENCOUNTER — HOME CARE/HOSPICE - HEALTHEAST (OUTPATIENT)
Dept: HOME HEALTH SERVICES | Facility: HOME HEALTH | Age: 35
End: 2020-06-19

## 2020-07-01 ENCOUNTER — COMMUNICATION - HEALTHEAST (OUTPATIENT)
Dept: NURSING | Facility: CLINIC | Age: 35
End: 2020-07-01

## 2020-07-03 ENCOUNTER — HOME CARE/HOSPICE - HEALTHEAST (OUTPATIENT)
Dept: HOME HEALTH SERVICES | Facility: HOME HEALTH | Age: 35
End: 2020-07-03

## 2020-07-06 ENCOUNTER — COMMUNICATION - HEALTHEAST (OUTPATIENT)
Dept: NURSING | Facility: CLINIC | Age: 35
End: 2020-07-06

## 2020-07-09 ENCOUNTER — COMMUNICATION - HEALTHEAST (OUTPATIENT)
Dept: CARE COORDINATION | Facility: CLINIC | Age: 35
End: 2020-07-09

## 2020-07-09 ENCOUNTER — COMMUNICATION - HEALTHEAST (OUTPATIENT)
Dept: HOME HEALTH SERVICES | Facility: HOME HEALTH | Age: 35
End: 2020-07-09

## 2020-07-09 ENCOUNTER — HOME CARE/HOSPICE - HEALTHEAST (OUTPATIENT)
Dept: HOME HEALTH SERVICES | Facility: HOME HEALTH | Age: 35
End: 2020-07-09

## 2020-07-16 ENCOUNTER — HOME CARE/HOSPICE - HEALTHEAST (OUTPATIENT)
Dept: HOME HEALTH SERVICES | Facility: HOME HEALTH | Age: 35
End: 2020-07-16

## 2020-07-26 ENCOUNTER — AMBULATORY - HEALTHEAST (OUTPATIENT)
Dept: MULTI SPECIALTY CLINIC | Facility: CLINIC | Age: 35
End: 2020-07-26

## 2020-07-26 ENCOUNTER — HOME CARE/HOSPICE - HEALTHEAST (OUTPATIENT)
Dept: HOME HEALTH SERVICES | Facility: HOME HEALTH | Age: 35
End: 2020-07-26

## 2020-07-30 ENCOUNTER — HOME CARE/HOSPICE - HEALTHEAST (OUTPATIENT)
Dept: HOME HEALTH SERVICES | Facility: HOME HEALTH | Age: 35
End: 2020-07-30

## 2020-08-03 ENCOUNTER — COMMUNICATION - HEALTHEAST (OUTPATIENT)
Dept: NURSING | Facility: CLINIC | Age: 35
End: 2020-08-03

## 2020-08-10 ENCOUNTER — COMMUNICATION - HEALTHEAST (OUTPATIENT)
Dept: NURSING | Facility: CLINIC | Age: 35
End: 2020-08-10

## 2020-08-12 ENCOUNTER — HOME CARE/HOSPICE - HEALTHEAST (OUTPATIENT)
Dept: HOME HEALTH SERVICES | Facility: HOME HEALTH | Age: 35
End: 2020-08-12

## 2020-08-13 ENCOUNTER — HOME CARE/HOSPICE - HEALTHEAST (OUTPATIENT)
Dept: HOME HEALTH SERVICES | Facility: HOME HEALTH | Age: 35
End: 2020-08-13

## 2020-08-18 ENCOUNTER — AMBULATORY - HEALTHEAST (OUTPATIENT)
Dept: MULTI SPECIALTY CLINIC | Facility: CLINIC | Age: 35
End: 2020-08-18

## 2020-08-18 ENCOUNTER — HOME CARE/HOSPICE - HEALTHEAST (OUTPATIENT)
Dept: HOME HEALTH SERVICES | Facility: HOME HEALTH | Age: 35
End: 2020-08-18

## 2020-08-18 DIAGNOSIS — K29.50 OTHER CHRONIC GASTRITIS WITHOUT HEMORRHAGE: ICD-10-CM

## 2020-08-18 RX ORDER — FAMOTIDINE 20 MG/1
20 TABLET, FILM COATED ORAL AT BEDTIME
Qty: 90 TABLET | Refills: 3 | Status: SHIPPED | OUTPATIENT
Start: 2020-08-18

## 2020-08-19 ENCOUNTER — COMMUNICATION - HEALTHEAST (OUTPATIENT)
Dept: NURSING | Facility: CLINIC | Age: 35
End: 2020-08-19

## 2020-08-19 ENCOUNTER — COMMUNICATION - HEALTHEAST (OUTPATIENT)
Dept: CARE COORDINATION | Facility: CLINIC | Age: 35
End: 2020-08-19

## 2020-08-27 ENCOUNTER — COMMUNICATION - HEALTHEAST (OUTPATIENT)
Dept: CARE COORDINATION | Facility: CLINIC | Age: 35
End: 2020-08-27

## 2020-08-27 ENCOUNTER — HOME CARE/HOSPICE - HEALTHEAST (OUTPATIENT)
Dept: HOME HEALTH SERVICES | Facility: HOME HEALTH | Age: 35
End: 2020-08-27

## 2020-08-30 ENCOUNTER — HOME CARE/HOSPICE - HEALTHEAST (OUTPATIENT)
Dept: HOME HEALTH SERVICES | Facility: HOME HEALTH | Age: 35
End: 2020-08-30

## 2020-09-01 ENCOUNTER — COMMUNICATION - HEALTHEAST (OUTPATIENT)
Dept: NURSING | Facility: CLINIC | Age: 35
End: 2020-09-01

## 2020-09-02 ENCOUNTER — COMMUNICATION - HEALTHEAST (OUTPATIENT)
Dept: CARE COORDINATION | Facility: CLINIC | Age: 35
End: 2020-09-02

## 2020-09-02 ASSESSMENT — PATIENT HEALTH QUESTIONNAIRE - PHQ9: SUM OF ALL RESPONSES TO PHQ QUESTIONS 1-9: 16

## 2020-09-02 ASSESSMENT — MIFFLIN-ST. JEOR: SCORE: 1434.75

## 2020-09-03 ENCOUNTER — OFFICE VISIT - HEALTHEAST (OUTPATIENT)
Dept: FAMILY MEDICINE | Facility: CLINIC | Age: 35
End: 2020-09-03

## 2020-09-03 ENCOUNTER — COMMUNICATION - HEALTHEAST (OUTPATIENT)
Dept: FAMILY MEDICINE | Facility: CLINIC | Age: 35
End: 2020-09-03

## 2020-09-03 DIAGNOSIS — Z99.2 ESRD (END STAGE RENAL DISEASE) ON DIALYSIS (H): ICD-10-CM

## 2020-09-03 DIAGNOSIS — I10 ESSENTIAL HYPERTENSION: ICD-10-CM

## 2020-09-03 DIAGNOSIS — F32.2 CURRENT SEVERE EPISODE OF MAJOR DEPRESSIVE DISORDER WITHOUT PSYCHOTIC FEATURES WITHOUT PRIOR EPISODE (H): ICD-10-CM

## 2020-09-03 DIAGNOSIS — K59.01 SLOW TRANSIT CONSTIPATION: ICD-10-CM

## 2020-09-03 DIAGNOSIS — Z23 IMMUNIZATION DUE: ICD-10-CM

## 2020-09-03 DIAGNOSIS — N18.6 ESRD (END STAGE RENAL DISEASE) ON DIALYSIS (H): ICD-10-CM

## 2020-09-03 LAB
LDLC SERPL CALC-MCNC: 106 MG/DL
TSH SERPL DL<=0.005 MIU/L-ACNC: 2.29 UIU/ML (ref 0.3–5)

## 2020-09-06 RX ORDER — DOCUSATE SODIUM 50MG AND SENNOSIDES 8.6MG 8.6; 5 MG/1; MG/1
TABLET, FILM COATED ORAL
Qty: 60 TABLET | Refills: 11 | Status: SHIPPED | OUTPATIENT
Start: 2020-09-06

## 2020-09-09 ENCOUNTER — COMMUNICATION - HEALTHEAST (OUTPATIENT)
Dept: CARE COORDINATION | Facility: CLINIC | Age: 35
End: 2020-09-09

## 2020-09-09 ENCOUNTER — COMMUNICATION - HEALTHEAST (OUTPATIENT)
Dept: FAMILY MEDICINE | Facility: CLINIC | Age: 35
End: 2020-09-09

## 2020-09-10 ENCOUNTER — HOME CARE/HOSPICE - HEALTHEAST (OUTPATIENT)
Dept: HOME HEALTH SERVICES | Facility: HOME HEALTH | Age: 35
End: 2020-09-10

## 2020-09-10 ENCOUNTER — COMMUNICATION - HEALTHEAST (OUTPATIENT)
Dept: HOME HEALTH SERVICES | Facility: HOME HEALTH | Age: 35
End: 2020-09-10

## 2020-09-10 RX ORDER — LOSARTAN POTASSIUM 50 MG/1
50 TABLET ORAL DAILY
Status: SHIPPED | COMMUNITY
Start: 2020-09-10 | End: 2023-06-21 | Stop reason: ALTCHOICE

## 2020-09-10 RX ORDER — CARVEDILOL 6.25 MG/1
3.12 TABLET ORAL 2 TIMES DAILY WITH MEALS
Status: SHIPPED | COMMUNITY
Start: 2020-09-10 | End: 2023-06-21

## 2020-09-24 ENCOUNTER — HOME CARE/HOSPICE - HEALTHEAST (OUTPATIENT)
Dept: HOME HEALTH SERVICES | Facility: HOME HEALTH | Age: 35
End: 2020-09-24

## 2020-09-24 ENCOUNTER — COMMUNICATION - HEALTHEAST (OUTPATIENT)
Dept: CARE COORDINATION | Facility: CLINIC | Age: 35
End: 2020-09-24

## 2020-09-29 ENCOUNTER — COMMUNICATION - HEALTHEAST (OUTPATIENT)
Dept: NURSING | Facility: CLINIC | Age: 35
End: 2020-09-29

## 2020-09-30 ENCOUNTER — COMMUNICATION - HEALTHEAST (OUTPATIENT)
Dept: NURSING | Facility: CLINIC | Age: 35
End: 2020-09-30

## 2020-10-06 ENCOUNTER — COMMUNICATION - HEALTHEAST (OUTPATIENT)
Dept: NURSING | Facility: CLINIC | Age: 35
End: 2020-10-06

## 2020-10-08 ENCOUNTER — HOME CARE/HOSPICE - HEALTHEAST (OUTPATIENT)
Dept: HOME HEALTH SERVICES | Facility: HOME HEALTH | Age: 35
End: 2020-10-08

## 2020-10-12 ENCOUNTER — COMMUNICATION - HEALTHEAST (OUTPATIENT)
Dept: HOME HEALTH SERVICES | Facility: HOME HEALTH | Age: 35
End: 2020-10-12

## 2020-10-13 ENCOUNTER — HOME CARE/HOSPICE - HEALTHEAST (OUTPATIENT)
Dept: HOME HEALTH SERVICES | Facility: HOME HEALTH | Age: 35
End: 2020-10-13

## 2020-10-13 ENCOUNTER — COMMUNICATION - HEALTHEAST (OUTPATIENT)
Dept: CARE COORDINATION | Facility: CLINIC | Age: 35
End: 2020-10-13

## 2020-10-13 ENCOUNTER — OFFICE VISIT - HEALTHEAST (OUTPATIENT)
Dept: FAMILY MEDICINE | Facility: CLINIC | Age: 35
End: 2020-10-13

## 2020-10-13 DIAGNOSIS — Z99.2 ESRD (END STAGE RENAL DISEASE) ON DIALYSIS (H): ICD-10-CM

## 2020-10-13 DIAGNOSIS — N18.6 ESRD (END STAGE RENAL DISEASE) ON DIALYSIS (H): ICD-10-CM

## 2020-10-13 DIAGNOSIS — F32.4 MAJOR DEPRESSIVE DISORDER WITH SINGLE EPISODE, IN PARTIAL REMISSION (H): ICD-10-CM

## 2020-10-21 ENCOUNTER — HOME CARE/HOSPICE - HEALTHEAST (OUTPATIENT)
Dept: HOME HEALTH SERVICES | Facility: HOME HEALTH | Age: 35
End: 2020-10-21

## 2020-10-22 ENCOUNTER — COMMUNICATION - HEALTHEAST (OUTPATIENT)
Dept: HOME HEALTH SERVICES | Facility: HOME HEALTH | Age: 35
End: 2020-10-22

## 2020-10-22 ENCOUNTER — HOME CARE/HOSPICE - HEALTHEAST (OUTPATIENT)
Dept: HOME HEALTH SERVICES | Facility: HOME HEALTH | Age: 35
End: 2020-10-22

## 2020-10-29 ENCOUNTER — COMMUNICATION - HEALTHEAST (OUTPATIENT)
Dept: NURSING | Facility: CLINIC | Age: 35
End: 2020-10-29

## 2020-11-05 ENCOUNTER — COMMUNICATION - HEALTHEAST (OUTPATIENT)
Dept: NURSING | Facility: CLINIC | Age: 35
End: 2020-11-05

## 2020-11-05 ENCOUNTER — HOME CARE/HOSPICE - HEALTHEAST (OUTPATIENT)
Dept: HOME HEALTH SERVICES | Facility: HOME HEALTH | Age: 35
End: 2020-11-05

## 2020-11-05 ENCOUNTER — COMMUNICATION - HEALTHEAST (OUTPATIENT)
Dept: HOME HEALTH SERVICES | Facility: HOME HEALTH | Age: 35
End: 2020-11-05

## 2020-11-05 RX ORDER — CINACALCET 90 MG/1
90 TABLET, FILM COATED ORAL DAILY
Status: SHIPPED | COMMUNITY
Start: 2020-11-05

## 2020-11-05 ASSESSMENT — ACTIVITIES OF DAILY LIVING (ADL): DEPENDENT_IADLS:: CLEANING;COOKING;LAUNDRY;SHOPPING;MEAL PREPARATION;MEDICATION MANAGEMENT;TRANSPORTATION

## 2020-11-13 ENCOUNTER — COMMUNICATION - HEALTHEAST (OUTPATIENT)
Dept: CARE COORDINATION | Facility: CLINIC | Age: 35
End: 2020-11-13

## 2020-11-19 ENCOUNTER — HOME CARE/HOSPICE - HEALTHEAST (OUTPATIENT)
Dept: HOME HEALTH SERVICES | Facility: HOME HEALTH | Age: 35
End: 2020-11-19

## 2020-11-19 ENCOUNTER — OFFICE VISIT - HEALTHEAST (OUTPATIENT)
Dept: FAMILY MEDICINE | Facility: CLINIC | Age: 35
End: 2020-11-19

## 2020-11-19 DIAGNOSIS — F32.4 MAJOR DEPRESSIVE DISORDER WITH SINGLE EPISODE, IN PARTIAL REMISSION (H): ICD-10-CM

## 2020-11-19 DIAGNOSIS — M54.50 ACUTE BILATERAL LOW BACK PAIN WITHOUT SCIATICA: ICD-10-CM

## 2020-11-19 DIAGNOSIS — G47.00 INSOMNIA, UNSPECIFIED TYPE: ICD-10-CM

## 2020-11-19 RX ORDER — ACETAMINOPHEN 500 MG
500 TABLET ORAL EVERY 6 HOURS PRN
Qty: 90 TABLET | Refills: 6 | Status: SHIPPED | OUTPATIENT
Start: 2020-11-19 | End: 2021-11-19

## 2020-11-20 ENCOUNTER — COMMUNICATION - HEALTHEAST (OUTPATIENT)
Dept: FAMILY MEDICINE | Facility: CLINIC | Age: 35
End: 2020-11-20

## 2020-11-24 ENCOUNTER — HOME CARE/HOSPICE - HEALTHEAST (OUTPATIENT)
Dept: HOME HEALTH SERVICES | Facility: HOME HEALTH | Age: 35
End: 2020-11-24

## 2020-12-03 ENCOUNTER — COMMUNICATION - HEALTHEAST (OUTPATIENT)
Dept: NURSING | Facility: CLINIC | Age: 35
End: 2020-12-03

## 2020-12-03 ENCOUNTER — HOME CARE/HOSPICE - HEALTHEAST (OUTPATIENT)
Dept: HOME HEALTH SERVICES | Facility: HOME HEALTH | Age: 35
End: 2020-12-03

## 2020-12-14 ENCOUNTER — HOME CARE/HOSPICE - HEALTHEAST (OUTPATIENT)
Dept: HOME HEALTH SERVICES | Facility: HOME HEALTH | Age: 35
End: 2020-12-14

## 2020-12-17 ENCOUNTER — COMMUNICATION - HEALTHEAST (OUTPATIENT)
Dept: NURSING | Facility: CLINIC | Age: 35
End: 2020-12-17

## 2020-12-17 ENCOUNTER — HOME CARE/HOSPICE - HEALTHEAST (OUTPATIENT)
Dept: HOME HEALTH SERVICES | Facility: HOME HEALTH | Age: 35
End: 2020-12-17

## 2020-12-18 ENCOUNTER — HOME CARE/HOSPICE - HEALTHEAST (OUTPATIENT)
Dept: HOME HEALTH SERVICES | Facility: HOME HEALTH | Age: 35
End: 2020-12-18

## 2020-12-21 ENCOUNTER — HOME CARE/HOSPICE - HEALTHEAST (OUTPATIENT)
Dept: HOME HEALTH SERVICES | Facility: HOME HEALTH | Age: 35
End: 2020-12-21

## 2020-12-21 RX ORDER — MECLIZINE HCL 12.5 MG 12.5 MG/1
12.5 TABLET ORAL 3 TIMES DAILY PRN
Status: SHIPPED | COMMUNITY
Start: 2020-12-21

## 2020-12-29 ENCOUNTER — OFFICE VISIT - HEALTHEAST (OUTPATIENT)
Dept: FAMILY MEDICINE | Facility: CLINIC | Age: 35
End: 2020-12-29

## 2020-12-29 ENCOUNTER — COMMUNICATION - HEALTHEAST (OUTPATIENT)
Dept: NURSING | Facility: CLINIC | Age: 35
End: 2020-12-29

## 2020-12-29 DIAGNOSIS — Z99.2 ESRD (END STAGE RENAL DISEASE) ON DIALYSIS (H): ICD-10-CM

## 2020-12-29 DIAGNOSIS — F32.4 MAJOR DEPRESSIVE DISORDER WITH SINGLE EPISODE, IN PARTIAL REMISSION (H): ICD-10-CM

## 2020-12-29 DIAGNOSIS — N18.6 ESRD (END STAGE RENAL DISEASE) ON DIALYSIS (H): ICD-10-CM

## 2020-12-29 DIAGNOSIS — Z00.00 ROUTINE GENERAL MEDICAL EXAMINATION AT A HEALTH CARE FACILITY: ICD-10-CM

## 2020-12-29 DIAGNOSIS — K92.1 BLOOD IN STOOL: ICD-10-CM

## 2020-12-31 ENCOUNTER — HOME CARE/HOSPICE - HEALTHEAST (OUTPATIENT)
Dept: HOME HEALTH SERVICES | Facility: HOME HEALTH | Age: 35
End: 2020-12-31

## 2021-01-04 ENCOUNTER — HOME CARE/HOSPICE - HEALTHEAST (OUTPATIENT)
Dept: HOME HEALTH SERVICES | Facility: HOME HEALTH | Age: 36
End: 2021-01-04

## 2021-01-04 ENCOUNTER — COMMUNICATION - HEALTHEAST (OUTPATIENT)
Dept: HOME HEALTH SERVICES | Facility: HOME HEALTH | Age: 36
End: 2021-01-04

## 2021-01-08 ENCOUNTER — COMMUNICATION - HEALTHEAST (OUTPATIENT)
Dept: NURSING | Facility: CLINIC | Age: 36
End: 2021-01-08

## 2021-01-19 ENCOUNTER — HOME CARE/HOSPICE - HEALTHEAST (OUTPATIENT)
Dept: HOME HEALTH SERVICES | Facility: HOME HEALTH | Age: 36
End: 2021-01-19

## 2021-02-01 ENCOUNTER — COMMUNICATION - HEALTHEAST (OUTPATIENT)
Dept: NURSING | Facility: CLINIC | Age: 36
End: 2021-02-01

## 2021-02-02 ENCOUNTER — HOME CARE/HOSPICE - HEALTHEAST (OUTPATIENT)
Dept: HOME HEALTH SERVICES | Facility: HOME HEALTH | Age: 36
End: 2021-02-02

## 2021-02-03 ENCOUNTER — COMMUNICATION - HEALTHEAST (OUTPATIENT)
Dept: NURSING | Facility: CLINIC | Age: 36
End: 2021-02-03

## 2021-02-04 ENCOUNTER — RECORDS - HEALTHEAST (OUTPATIENT)
Dept: ADMINISTRATIVE | Facility: OTHER | Age: 36
End: 2021-02-04

## 2021-02-16 ENCOUNTER — HOME CARE/HOSPICE - HEALTHEAST (OUTPATIENT)
Dept: HOME HEALTH SERVICES | Facility: HOME HEALTH | Age: 36
End: 2021-02-16

## 2021-02-25 ENCOUNTER — COMMUNICATION - HEALTHEAST (OUTPATIENT)
Dept: NURSING | Facility: CLINIC | Age: 36
End: 2021-02-25

## 2021-03-02 ENCOUNTER — HOME CARE/HOSPICE - HEALTHEAST (OUTPATIENT)
Dept: HOME HEALTH SERVICES | Facility: HOME HEALTH | Age: 36
End: 2021-03-02

## 2021-03-03 ENCOUNTER — COMMUNICATION - HEALTHEAST (OUTPATIENT)
Dept: NURSING | Facility: CLINIC | Age: 36
End: 2021-03-03

## 2021-03-08 ENCOUNTER — COMMUNICATION - HEALTHEAST (OUTPATIENT)
Dept: NURSING | Facility: CLINIC | Age: 36
End: 2021-03-08

## 2021-03-09 ENCOUNTER — COMMUNICATION - HEALTHEAST (OUTPATIENT)
Dept: HOME HEALTH SERVICES | Facility: HOME HEALTH | Age: 36
End: 2021-03-09

## 2021-03-11 ENCOUNTER — RECORDS - HEALTHEAST (OUTPATIENT)
Dept: ADMINISTRATIVE | Facility: OTHER | Age: 36
End: 2021-03-11

## 2021-03-12 ENCOUNTER — HOME CARE/HOSPICE - HEALTHEAST (OUTPATIENT)
Dept: HOME HEALTH SERVICES | Facility: HOME HEALTH | Age: 36
End: 2021-03-12

## 2021-03-19 ENCOUNTER — HOME CARE/HOSPICE - HEALTHEAST (OUTPATIENT)
Dept: HOME HEALTH SERVICES | Facility: HOME HEALTH | Age: 36
End: 2021-03-19

## 2021-03-25 ENCOUNTER — COMMUNICATION - HEALTHEAST (OUTPATIENT)
Dept: NURSING | Facility: CLINIC | Age: 36
End: 2021-03-25

## 2021-03-25 ENCOUNTER — DOCUMENTATION ONLY (OUTPATIENT)
Dept: OTHER | Facility: CLINIC | Age: 36
End: 2021-03-25

## 2021-03-25 ENCOUNTER — AMBULATORY - HEALTHEAST (OUTPATIENT)
Dept: OTHER | Facility: CLINIC | Age: 36
End: 2021-03-25

## 2021-03-30 ENCOUNTER — COMMUNICATION - HEALTHEAST (OUTPATIENT)
Dept: FAMILY MEDICINE | Facility: CLINIC | Age: 36
End: 2021-03-30

## 2021-03-30 ENCOUNTER — COMMUNICATION - HEALTHEAST (OUTPATIENT)
Dept: NURSING | Facility: CLINIC | Age: 36
End: 2021-03-30

## 2021-03-30 DIAGNOSIS — F32.2 CURRENT SEVERE EPISODE OF MAJOR DEPRESSIVE DISORDER WITHOUT PSYCHOTIC FEATURES WITHOUT PRIOR EPISODE (H): ICD-10-CM

## 2021-04-15 ENCOUNTER — HOME CARE/HOSPICE - HEALTHEAST (OUTPATIENT)
Dept: ADMINISTRATIVE | Facility: OTHER | Age: 36
End: 2021-04-15

## 2021-04-29 ENCOUNTER — COMMUNICATION - HEALTHEAST (OUTPATIENT)
Dept: NURSING | Facility: CLINIC | Age: 36
End: 2021-04-29

## 2021-05-04 ENCOUNTER — COMMUNICATION - HEALTHEAST (OUTPATIENT)
Dept: NURSING | Facility: CLINIC | Age: 36
End: 2021-05-04

## 2021-05-06 ENCOUNTER — COMMUNICATION - HEALTHEAST (OUTPATIENT)
Dept: HOME HEALTH SERVICES | Facility: HOME HEALTH | Age: 36
End: 2021-05-06

## 2021-05-12 ENCOUNTER — RECORDS - HEALTHEAST (OUTPATIENT)
Dept: ADMINISTRATIVE | Facility: OTHER | Age: 36
End: 2021-05-12

## 2021-05-17 ENCOUNTER — COMMUNICATION - HEALTHEAST (OUTPATIENT)
Dept: NURSING | Facility: CLINIC | Age: 36
End: 2021-05-17

## 2021-05-26 VITALS
DIASTOLIC BLOOD PRESSURE: 60 MMHG | OXYGEN SATURATION: 98 % | TEMPERATURE: 97.9 F | SYSTOLIC BLOOD PRESSURE: 100 MMHG | RESPIRATION RATE: 18 BRPM | HEART RATE: 78 BPM

## 2021-05-26 VITALS
TEMPERATURE: 98.2 F | HEART RATE: 66 BPM | OXYGEN SATURATION: 100 % | SYSTOLIC BLOOD PRESSURE: 150 MMHG | DIASTOLIC BLOOD PRESSURE: 86 MMHG | RESPIRATION RATE: 16 BRPM

## 2021-05-26 VITALS
OXYGEN SATURATION: 97 % | TEMPERATURE: 98.1 F | DIASTOLIC BLOOD PRESSURE: 60 MMHG | SYSTOLIC BLOOD PRESSURE: 100 MMHG | HEART RATE: 70 BPM

## 2021-05-26 VITALS
TEMPERATURE: 98.1 F | SYSTOLIC BLOOD PRESSURE: 96 MMHG | OXYGEN SATURATION: 97 % | HEART RATE: 70 BPM | RESPIRATION RATE: 18 BRPM | DIASTOLIC BLOOD PRESSURE: 60 MMHG

## 2021-05-26 VITALS
TEMPERATURE: 97.4 F | SYSTOLIC BLOOD PRESSURE: 108 MMHG | DIASTOLIC BLOOD PRESSURE: 67 MMHG | RESPIRATION RATE: 18 BRPM | HEART RATE: 74 BPM | OXYGEN SATURATION: 97 %

## 2021-05-26 VITALS
DIASTOLIC BLOOD PRESSURE: 88 MMHG | OXYGEN SATURATION: 98 % | SYSTOLIC BLOOD PRESSURE: 144 MMHG | HEART RATE: 88 BPM | TEMPERATURE: 97.9 F | RESPIRATION RATE: 16 BRPM

## 2021-05-26 VITALS
OXYGEN SATURATION: 97 % | DIASTOLIC BLOOD PRESSURE: 80 MMHG | SYSTOLIC BLOOD PRESSURE: 150 MMHG | HEART RATE: 66 BPM | TEMPERATURE: 99.1 F

## 2021-05-26 VITALS
TEMPERATURE: 99.1 F | OXYGEN SATURATION: 98 % | DIASTOLIC BLOOD PRESSURE: 84 MMHG | RESPIRATION RATE: 16 BRPM | SYSTOLIC BLOOD PRESSURE: 156 MMHG | HEART RATE: 77 BPM

## 2021-05-26 VITALS
HEART RATE: 65 BPM | RESPIRATION RATE: 16 BRPM | SYSTOLIC BLOOD PRESSURE: 154 MMHG | OXYGEN SATURATION: 100 % | DIASTOLIC BLOOD PRESSURE: 86 MMHG | TEMPERATURE: 97.9 F

## 2021-05-26 VITALS
OXYGEN SATURATION: 98 % | DIASTOLIC BLOOD PRESSURE: 78 MMHG | SYSTOLIC BLOOD PRESSURE: 128 MMHG | TEMPERATURE: 99.5 F | HEART RATE: 70 BPM

## 2021-05-26 VITALS
RESPIRATION RATE: 18 BRPM | DIASTOLIC BLOOD PRESSURE: 60 MMHG | TEMPERATURE: 97.7 F | SYSTOLIC BLOOD PRESSURE: 96 MMHG | OXYGEN SATURATION: 99 % | HEART RATE: 97 BPM

## 2021-05-26 VITALS
HEART RATE: 69 BPM | OXYGEN SATURATION: 98 % | DIASTOLIC BLOOD PRESSURE: 60 MMHG | RESPIRATION RATE: 18 BRPM | SYSTOLIC BLOOD PRESSURE: 100 MMHG | TEMPERATURE: 98.5 F

## 2021-05-26 VITALS
SYSTOLIC BLOOD PRESSURE: 130 MMHG | DIASTOLIC BLOOD PRESSURE: 82 MMHG | TEMPERATURE: 99 F | OXYGEN SATURATION: 98 % | RESPIRATION RATE: 14 BRPM | HEART RATE: 62 BPM

## 2021-05-26 VITALS
HEART RATE: 75 BPM | SYSTOLIC BLOOD PRESSURE: 142 MMHG | DIASTOLIC BLOOD PRESSURE: 72 MMHG | OXYGEN SATURATION: 98 % | TEMPERATURE: 99 F

## 2021-05-26 VITALS
RESPIRATION RATE: 16 BRPM | DIASTOLIC BLOOD PRESSURE: 88 MMHG | SYSTOLIC BLOOD PRESSURE: 130 MMHG | OXYGEN SATURATION: 98 % | TEMPERATURE: 99.3 F | HEART RATE: 64 BPM

## 2021-05-26 VITALS
HEART RATE: 72 BPM | SYSTOLIC BLOOD PRESSURE: 110 MMHG | DIASTOLIC BLOOD PRESSURE: 68 MMHG | TEMPERATURE: 98 F | RESPIRATION RATE: 16 BRPM | OXYGEN SATURATION: 98 %

## 2021-05-26 VITALS
HEART RATE: 66 BPM | TEMPERATURE: 99 F | SYSTOLIC BLOOD PRESSURE: 154 MMHG | RESPIRATION RATE: 16 BRPM | OXYGEN SATURATION: 100 % | DIASTOLIC BLOOD PRESSURE: 78 MMHG

## 2021-05-26 VITALS
TEMPERATURE: 98.5 F | RESPIRATION RATE: 16 BRPM | DIASTOLIC BLOOD PRESSURE: 84 MMHG | HEART RATE: 88 BPM | SYSTOLIC BLOOD PRESSURE: 166 MMHG | OXYGEN SATURATION: 99 %

## 2021-05-26 VITALS
TEMPERATURE: 99.1 F | OXYGEN SATURATION: 100 % | DIASTOLIC BLOOD PRESSURE: 90 MMHG | HEART RATE: 62 BPM | SYSTOLIC BLOOD PRESSURE: 144 MMHG | RESPIRATION RATE: 16 BRPM

## 2021-05-26 VITALS
SYSTOLIC BLOOD PRESSURE: 163 MMHG | TEMPERATURE: 99.3 F | OXYGEN SATURATION: 97 % | DIASTOLIC BLOOD PRESSURE: 98 MMHG | HEART RATE: 62 BPM | RESPIRATION RATE: 16 BRPM

## 2021-05-26 VITALS
SYSTOLIC BLOOD PRESSURE: 166 MMHG | RESPIRATION RATE: 18 BRPM | TEMPERATURE: 97.1 F | HEART RATE: 70 BPM | DIASTOLIC BLOOD PRESSURE: 110 MMHG | OXYGEN SATURATION: 98 %

## 2021-05-26 VITALS
OXYGEN SATURATION: 96 % | SYSTOLIC BLOOD PRESSURE: 130 MMHG | HEART RATE: 69 BPM | DIASTOLIC BLOOD PRESSURE: 78 MMHG | TEMPERATURE: 99.7 F

## 2021-05-26 VITALS
SYSTOLIC BLOOD PRESSURE: 130 MMHG | DIASTOLIC BLOOD PRESSURE: 80 MMHG | HEART RATE: 66 BPM | OXYGEN SATURATION: 97 % | TEMPERATURE: 99.5 F

## 2021-05-26 VITALS
OXYGEN SATURATION: 100 % | TEMPERATURE: 99 F | RESPIRATION RATE: 16 BRPM | DIASTOLIC BLOOD PRESSURE: 82 MMHG | HEART RATE: 69 BPM | SYSTOLIC BLOOD PRESSURE: 146 MMHG

## 2021-05-26 VITALS
TEMPERATURE: 99.9 F | DIASTOLIC BLOOD PRESSURE: 78 MMHG | OXYGEN SATURATION: 97 % | HEART RATE: 74 BPM | SYSTOLIC BLOOD PRESSURE: 126 MMHG

## 2021-05-26 VITALS
TEMPERATURE: 98.9 F | RESPIRATION RATE: 16 BRPM | HEART RATE: 65 BPM | OXYGEN SATURATION: 98 % | SYSTOLIC BLOOD PRESSURE: 140 MMHG | DIASTOLIC BLOOD PRESSURE: 74 MMHG

## 2021-05-26 VITALS
HEART RATE: 75 BPM | OXYGEN SATURATION: 99 % | TEMPERATURE: 99.1 F | SYSTOLIC BLOOD PRESSURE: 130 MMHG | DIASTOLIC BLOOD PRESSURE: 80 MMHG

## 2021-05-26 ASSESSMENT — PATIENT HEALTH QUESTIONNAIRE - PHQ9: SUM OF ALL RESPONSES TO PHQ QUESTIONS 1-9: 4

## 2021-05-27 ENCOUNTER — COMMUNICATION - HEALTHEAST (OUTPATIENT)
Dept: NURSING | Facility: CLINIC | Age: 36
End: 2021-05-27

## 2021-05-27 VITALS
RESPIRATION RATE: 16 BRPM | TEMPERATURE: 97.7 F | SYSTOLIC BLOOD PRESSURE: 102 MMHG | OXYGEN SATURATION: 99 % | DIASTOLIC BLOOD PRESSURE: 70 MMHG | HEART RATE: 76 BPM

## 2021-05-27 VITALS
OXYGEN SATURATION: 99 % | TEMPERATURE: 97.8 F | HEART RATE: 67 BPM | SYSTOLIC BLOOD PRESSURE: 140 MMHG | DIASTOLIC BLOOD PRESSURE: 90 MMHG | RESPIRATION RATE: 16 BRPM

## 2021-05-27 VITALS
SYSTOLIC BLOOD PRESSURE: 110 MMHG | TEMPERATURE: 97.9 F | OXYGEN SATURATION: 98 % | DIASTOLIC BLOOD PRESSURE: 68 MMHG | RESPIRATION RATE: 18 BRPM | HEART RATE: 65 BPM

## 2021-05-27 VITALS
DIASTOLIC BLOOD PRESSURE: 66 MMHG | TEMPERATURE: 97.8 F | RESPIRATION RATE: 16 BRPM | HEART RATE: 89 BPM | SYSTOLIC BLOOD PRESSURE: 90 MMHG | OXYGEN SATURATION: 95 %

## 2021-05-27 VITALS
TEMPERATURE: 97.5 F | HEART RATE: 78 BPM | SYSTOLIC BLOOD PRESSURE: 100 MMHG | DIASTOLIC BLOOD PRESSURE: 66 MMHG | OXYGEN SATURATION: 98 % | RESPIRATION RATE: 18 BRPM

## 2021-05-27 VITALS
TEMPERATURE: 99.2 F | DIASTOLIC BLOOD PRESSURE: 56 MMHG | HEART RATE: 74 BPM | OXYGEN SATURATION: 99 % | RESPIRATION RATE: 16 BRPM | SYSTOLIC BLOOD PRESSURE: 94 MMHG

## 2021-05-27 VITALS
HEART RATE: 89 BPM | DIASTOLIC BLOOD PRESSURE: 68 MMHG | OXYGEN SATURATION: 94 % | TEMPERATURE: 97.8 F | RESPIRATION RATE: 16 BRPM | SYSTOLIC BLOOD PRESSURE: 100 MMHG

## 2021-05-27 VITALS
HEART RATE: 64 BPM | RESPIRATION RATE: 18 BRPM | TEMPERATURE: 98.7 F | SYSTOLIC BLOOD PRESSURE: 122 MMHG | DIASTOLIC BLOOD PRESSURE: 76 MMHG | OXYGEN SATURATION: 98 %

## 2021-05-27 VITALS
DIASTOLIC BLOOD PRESSURE: 80 MMHG | SYSTOLIC BLOOD PRESSURE: 130 MMHG | RESPIRATION RATE: 16 BRPM | HEART RATE: 70 BPM | OXYGEN SATURATION: 97 %

## 2021-05-27 VITALS
DIASTOLIC BLOOD PRESSURE: 68 MMHG | OXYGEN SATURATION: 97 % | SYSTOLIC BLOOD PRESSURE: 106 MMHG | HEART RATE: 67 BPM | RESPIRATION RATE: 17 BRPM | TEMPERATURE: 98.8 F

## 2021-05-27 VITALS
DIASTOLIC BLOOD PRESSURE: 78 MMHG | HEART RATE: 80 BPM | SYSTOLIC BLOOD PRESSURE: 116 MMHG | OXYGEN SATURATION: 99 % | TEMPERATURE: 98 F | RESPIRATION RATE: 16 BRPM

## 2021-05-27 VITALS
HEART RATE: 77 BPM | TEMPERATURE: 97.8 F | RESPIRATION RATE: 16 BRPM | OXYGEN SATURATION: 99 % | DIASTOLIC BLOOD PRESSURE: 66 MMHG | SYSTOLIC BLOOD PRESSURE: 108 MMHG

## 2021-05-27 VITALS
RESPIRATION RATE: 16 BRPM | TEMPERATURE: 97.8 F | HEART RATE: 77 BPM | DIASTOLIC BLOOD PRESSURE: 86 MMHG | SYSTOLIC BLOOD PRESSURE: 110 MMHG | OXYGEN SATURATION: 99 %

## 2021-05-27 VITALS
SYSTOLIC BLOOD PRESSURE: 104 MMHG | DIASTOLIC BLOOD PRESSURE: 62 MMHG | TEMPERATURE: 97 F | RESPIRATION RATE: 16 BRPM | HEART RATE: 68 BPM

## 2021-05-27 VITALS
OXYGEN SATURATION: 98 % | DIASTOLIC BLOOD PRESSURE: 64 MMHG | HEART RATE: 70 BPM | TEMPERATURE: 98.8 F | SYSTOLIC BLOOD PRESSURE: 108 MMHG

## 2021-05-27 VITALS
DIASTOLIC BLOOD PRESSURE: 60 MMHG | OXYGEN SATURATION: 98 % | RESPIRATION RATE: 18 BRPM | TEMPERATURE: 99 F | HEART RATE: 79 BPM | SYSTOLIC BLOOD PRESSURE: 96 MMHG

## 2021-05-27 VITALS
OXYGEN SATURATION: 99 % | TEMPERATURE: 97.7 F | DIASTOLIC BLOOD PRESSURE: 68 MMHG | RESPIRATION RATE: 16 BRPM | SYSTOLIC BLOOD PRESSURE: 104 MMHG | HEART RATE: 87 BPM

## 2021-05-27 VITALS
DIASTOLIC BLOOD PRESSURE: 60 MMHG | OXYGEN SATURATION: 98 % | HEART RATE: 81 BPM | TEMPERATURE: 97.6 F | SYSTOLIC BLOOD PRESSURE: 98 MMHG | RESPIRATION RATE: 16 BRPM

## 2021-05-27 VITALS
RESPIRATION RATE: 16 BRPM | TEMPERATURE: 98.2 F | OXYGEN SATURATION: 99 % | HEART RATE: 78 BPM | SYSTOLIC BLOOD PRESSURE: 130 MMHG | DIASTOLIC BLOOD PRESSURE: 68 MMHG

## 2021-05-27 VITALS
RESPIRATION RATE: 16 BRPM | SYSTOLIC BLOOD PRESSURE: 110 MMHG | HEART RATE: 89 BPM | OXYGEN SATURATION: 98 % | DIASTOLIC BLOOD PRESSURE: 78 MMHG | TEMPERATURE: 99.6 F

## 2021-05-27 VITALS
RESPIRATION RATE: 18 BRPM | OXYGEN SATURATION: 98 % | TEMPERATURE: 98.1 F | SYSTOLIC BLOOD PRESSURE: 100 MMHG | HEART RATE: 79 BPM | DIASTOLIC BLOOD PRESSURE: 60 MMHG

## 2021-05-27 VITALS
RESPIRATION RATE: 18 BRPM | HEART RATE: 88 BPM | OXYGEN SATURATION: 98 % | SYSTOLIC BLOOD PRESSURE: 140 MMHG | DIASTOLIC BLOOD PRESSURE: 72 MMHG | TEMPERATURE: 98.5 F

## 2021-05-27 VITALS
RESPIRATION RATE: 18 BRPM | SYSTOLIC BLOOD PRESSURE: 100 MMHG | HEART RATE: 82 BPM | DIASTOLIC BLOOD PRESSURE: 60 MMHG | TEMPERATURE: 97.6 F | OXYGEN SATURATION: 98 %

## 2021-05-27 VITALS
DIASTOLIC BLOOD PRESSURE: 100 MMHG | HEART RATE: 72 BPM | TEMPERATURE: 99.3 F | OXYGEN SATURATION: 98 % | SYSTOLIC BLOOD PRESSURE: 150 MMHG | RESPIRATION RATE: 18 BRPM

## 2021-05-27 VITALS
SYSTOLIC BLOOD PRESSURE: 113 MMHG | HEART RATE: 81 BPM | TEMPERATURE: 98 F | DIASTOLIC BLOOD PRESSURE: 71 MMHG | RESPIRATION RATE: 14 BRPM

## 2021-05-27 VITALS
SYSTOLIC BLOOD PRESSURE: 136 MMHG | OXYGEN SATURATION: 99 % | HEART RATE: 77 BPM | TEMPERATURE: 98 F | RESPIRATION RATE: 16 BRPM | DIASTOLIC BLOOD PRESSURE: 70 MMHG

## 2021-05-27 VITALS
TEMPERATURE: 98 F | OXYGEN SATURATION: 98 % | SYSTOLIC BLOOD PRESSURE: 108 MMHG | DIASTOLIC BLOOD PRESSURE: 64 MMHG | RESPIRATION RATE: 16 BRPM | HEART RATE: 73 BPM

## 2021-05-27 VITALS
RESPIRATION RATE: 16 BRPM | SYSTOLIC BLOOD PRESSURE: 104 MMHG | DIASTOLIC BLOOD PRESSURE: 68 MMHG | TEMPERATURE: 99 F | OXYGEN SATURATION: 99 % | HEART RATE: 73 BPM

## 2021-05-27 ASSESSMENT — PATIENT HEALTH QUESTIONNAIRE - PHQ9: SUM OF ALL RESPONSES TO PHQ QUESTIONS 1-9: 16

## 2021-05-27 NOTE — PROGRESS NOTES
Mental Health Visit Note        Start time: 0900    Stop Time: 0952   Session # 2    Session Type: Patient is presenting for an Individual session.    Patient is a 33 y.o. Gentleman of [place] descent who is being seen today for psychotherapy to help with relief from depressed mood and anxiety.     Individuals present Patient, , Therapist.  .   New symptoms or complaints: None    Functional Impairment:   Personal: 4  Family: 2  Work: 0  Social:3     Clinical assessment of mental status:   Grooming: Well groomed  Attire: Appropriate  Age: Appears Stated  Behavior Towards Examiner: Cooperative  Motor Activity: Within normal   Eye Contact: Appropriate  Mood: Euthymic  Affect: Congruent w/content of speech  Speech/Language: Within normal  Attention: Within normal  Concentration: Within normal  Thought Process: Within normal  Thought Content: Hallucinations: Within noraml  Delusions: Within normal  Orientation: X 3  Memory: No Evidence of Impairment  Judgement: No Evidence of Impairment  Estimated Intelligence: Average  Demonstrated Insight: Adequate  Fund of Knowledge: adequate        Suicidal/Homicidal Ideation present: None Reported This Session    Patient's impression of their current status:   The patient described reasons for engaging in therapy services during today's session as helping him adjust his much as possible to his current medical situation and begin setting goals for his future.  Patient reports that he would very much like to obtain a [place] 's license but is having a hard time passing the online test being that he speaks [place].  Patient stated that he does not have any family or close friends as a result of requiring dialysis 3 times a week.  Patient reports that he would like to be optimistic regarding some day having a 's license and job although, gets overwhelmed when thinking about what needs to be done to have those things.      Therapist impression of patients  current state: We explored those possibilities of obtaining those goals of at least having a 's license.  Assisted patient with contacting the [place] [employer] finding out that the online test can be taken in the [place] language.  Also, explored the option of going through the [employer] materials with those individuals who are bilingual and could help him study for the test.  Patient's attitude appeared much more optimistic with respect to looking at something positive in his life.    Type of psychotherapeutic technique provided: Insight oriented, Client centered and Solution-focused    Progress toward short term goals: In the process of being developed    Review of long term goals: In the process of being developed    Diagnosis: 1. Major depressive disorder Recurrent episode                      2.  Generalized anxiety disorder    Plan and Follow up: 1-2 weeks      Discharge Criteria/Planning: Patient will continue with follow-up until therapy can be discontinued without return of signs and symptoms.    Judith Fernandes LICSW  4/8/19

## 2021-05-27 NOTE — PROGRESS NOTES
Mental Health Visit Note    3/25/19    Start time: 11:00   Stop Time: 11:52  Session # Visit #1    Session Type: Patient is presenting for an Individual session.    Presenting Issues:  Patient is a 33 yr old male who was referred by Charlee Breen PA-C. Patient reporting symptoms of depression and anxiety as he is experiencing a number of medical, financial and emotional issues. This is patient's first visist intake session. Insufficient information was collected during this encounter to complete a full diagnostic assessment.   Patient was not able to complete the intake form or inventories prior to today's appointment. The consent for service was reviewed with the patient. Patient verbally indicated understanding contents of the consent for service and provided signature on the form. The therapist will complete a thorough review of patient's medical record. Therapist will obtain a release of information for any community medical or mental health provider and/or service including   [facility] in [place].    Individuals Present: Patient, Therapist and Interpretor (Juana Agarwal)     New symptoms or complaints: Little interest or pleasure in doing things, feeling down, depressed, trouble falling asleep, dizziness, sweating, diarrhea, blurred vision, headaches, racing thoughts, recurrent bad memories, feeling angry, trouble concentrating, anxiousness, trouble relaxing, easily annoyed and fear the worst will happen.    Functional Impairment:   Personal: 4  Family: 3  Work: 0  Social:3    Clinical assessment of mental status:   Grooming: Well groomed  Attire: Appropriate  Age: Appears Stated  Behavior Towards Examiner: Cooperative  Motor Activity: Within normal   Eye Contact: Appropriate  Mood: Euthymic  Affect: Congruent w/content of speech  Speech/Language: Within normal  Attention: Within normal  Concentration: Within normal  Thought Process: Within normal  Thought Content: Hallucinations: Within  "noraml  Delusions: Within normal  Orientation: X 3  Memory: No Evidence of Impairment  Judgement: No Evidence of Impairment  Estimated Intelligence: Average  Demonstrated Insight: Adequate  Fund of Knowledge: adequate      Suicidal/Homicidal Ideation present: No current suicidal thoughts or ideations although, indicated when he resided  in [facility] he did attempt to kill himself by jumping off of a steep hill but he ended up slipping and landed on a ledge. No further attempts.    Patient's impression of their current status:   The patient described reasons for engaging in therapy services during today's session is because \" I have so much going on and so many things I don't know the future of in my life.\"  He is having difficulty struggling his health, finances, stable housing and legal status. Patient reports symptoms as: little interest or pleasure in doing things, feeling down, depressed, trouble falling asleep, dizziness, sweating, diarrhea, blurred vision, headaches, racing thoughts, recurrent bad memories, feeling angry, trouble concentrating, anxiousness, trouble relaxing, easily annoyed and fear the worst will happen.  The patient believes that the symptoms began 3 yrs ago but have progressively gotten worse within the last couple of months when he needed to change housing due to residing with a individual who he stated, he didn't feel safe living with in this person's home.Patient described the following expectations for treatment as, \"I don't know what to expect, I just want to get my new kidney so I can work again.\"    Therapist impression of patients current state:   The patient presented for an initial visit session with this provider. Patient is a 33 yr old male who was referred by Charlee Breen PA-C to engage in individual psychotherapy to address symptoms of anxiety and depression. Per medical record, patient has been diagnosed with Chronic Renal Failure and Chronic Kidney Disease. He is currently on the " Kidney transplant list and has been receiving Dialysis since 2016. His other concerns relate to the uncertainty of his legal status, financial and housing instability. Currently residing with a friend but it is not a long-term arrangement.     The patient appeared cooperative and open-minded throughout the intake and easily engaged in dialogue. Therapist and patient reviewed consent and privacy policy. Patient reported understanding and signed document-sent to be scanned into EMR. The patient has not engaged in outpatient psychotherapy services in the community so there is no diagnostic assessment on file or available.     The patient completed the following questionnaires for session today:   WHODAS WHODAS 2.0 12-item version:  51.0%    Scores presented in qualifiers to represent level of disability.  SEVERE Problem (high, extreme, ...) 50-95%    H1= 25  H2= 20  H3= 20    CAGEscoring (0/4)   PHQ-9, scoring 18 indicating severe depression  ANGEL-7  Scoring 21 indicating severe anxiety  C-SSRS. Although, patient had exhibited a suicidal jesture when living in a [place] back in [facility] over 10 years ago, he stated that he does not have any current suicidal thoughts or ideations.    These questionnaires will be used to inform diagnoses and will be uploaded to patient chart after standard DA is completed. Therapist and patient will further review patient's history during the next follow-up encounter in order to complete a standard Diagnostic Assessment. Goals for treatment will be established after the 2nd or 3rd follow-up session with this provider.    Type of psychotherapeutic technique provided: Insight oriented, Client centered and Solution-focused    Progress toward short term goals: Not yet established    Review of long term goals: Not yet established.    Diagnosis: Major Depressive Disorder with Current active Episode                       Generalized Anxiety Disorder    Plan and Follow up: This writer and  patient will discuss patient treatment goals and initiate an individual treatment plan at follow-up encounter.  .   Discharge Criteria/Planning: Patient will continue with follow-up until therapy can be discontinued without return of signs and symptoms.    Judith Fernandes [facility]  3/25/19

## 2021-05-28 NOTE — PROGRESS NOTES
"Patient is a 33 year old man with a history of ESRD in dialysis 3 times a week, GERD, major depressive disorder, ANGEL, PTSD. Patient is here today accompanied by Marcie  for enrollment in [facility]. Patient was referred to [facility] by the Clinic psychotherapist.   Patient currently lives in apartment in [place] with his aunt. He denied any safety concerns within the apartment and denied any need for DME at this time. Patient currently going to dialysis three days a week, Mondays, Wednesdays and Fridays and is on the list for a kidney transplant. Patient is being followed by Dr. Bridget Forte at [facility] for his kidney/dialysis care. Patient reported he attends all his dialysis appointments as scheduled.   Patient does all of his own cares independently is able to assist with chores around his home. He has stated he has started the process of getting a PCA who can take him to the grocery store, pharmacy and to medical appointments. Patient stated he currently does have medical transportation and his aunt's PCA takes him to the grocery store and pharmacy. Patient said he has not heard from [facility] where they are in the application process for his PCA, but would try to contact the County in next couple of days. Patient said he met with a \"tall woman\" from the [facility] regarding a PCA, but did not have her name or number. He was scheduled with [facility] MSW on 5/24/19 to discuss any concerns he had regarding his PCA application.   Patient said he is currently managing his meds independently. He reported that he is currently out of the lisinopril,  prescribed by his Nephrologist. He stated he was told to take more than the prescribed dose of 20 mg (1 tab) once day if his BP was elevated per Dr. Forte. Patient said he had been taking up to two tabs of lisinopril a day for elevated BP. With patient's permission, writer contacted his pharmacy and determined his prescription was last filled on 4/30/19 for a 30 " "day supply. Writer contacted [facility] and left a voice message for Dr. Forte's nurse informing her that patient was currently out his lisinopril and had been taking more than the amount that was prescribed on the medication bottle, he said, per Dr. Gill's directive. Writer provided his phone number if Dr. Forte's nurse had any questions. Patient's BP today was 125/70 with a pulse of 72. Patient said the only other medications he was taking was his omeprazole and his Glycolax. Patient denied any concerns with these medications.   Patient endorsed depression and anxiety and said with regards to his mental health that is was \"getting better\". Patient said he feels bad that he cannot contribute more financially to his aunt, but said she does not expect him to help out financially at this time. Patient is currently meeting with the Clinic psychotherapist and said he finds therapy beneficial. He denied wanting to see a psychiatrist and said he did not want to take medications for his anxiety/depression. Patient was receptive to the idea of an [facility] worker for additional mental health support. Patient will discuss the process for getting an [facility] worker at scheduled visit on 5/24/19 with [facility] MSW. Patient denied any suicidal or homicidal ideation during today's visit.   With regards to finances, patient said he receives $200 a month in cash assistance and $190 in food stamps. He currently does not pay any rent to his aunt and said he has enough money for the food he needs and his medications.   Patient stated he is currently taking classes towards his goal of being a [place] Citizen. Patient reported concerns about the Citizenship process during visit. He was encouraged to bring his concerns to appointment with [facility] MSW to 5/24/19.  [facility] RN will continue to monitor and be available as nursing needs arise.     RN Recommendations and Referrals  [facility] : Scheduled to meet with [facility] MSW on 5/24/19 related to " [facility] worker, PCA services, and Citizenship goal  See below for action plan    Action Plan        RN Will  Schedule [facility]  consult  Will add the patient to [facility] RN tracking list  Be available to the patient as nursing needs arise    Care Guide Will  Continue to support patient with identified goals.     Goals    Goals        Patient Stated      I would like support with applying for Citizenship, I am currently taking classes towards my Citizenship goal.  (pt-stated)      Action steps to achieve this goal  1.  I will meet with the [facility] MSW on 5/24/19 regarding the process for Citizenship.   2. I will gather information and all documents (copy of green card, SSI, MGIP, Food Stamp, Medial Assistance, utility bills, rent, marriage certificate, divorce decree, employment history or school, children information) and bring to appt with [facility] MSW.    Date goal set: 5/14/19        I would like to have a PCA in the next 3 months.  (pt-stated)      Action steps to achieve this goal  1.  I have filled out the application for PCA services with a woman from [facility]. I will try to find her number and give her a call regarding the status of the application.   2.  I will meet with the [facility] MSW on 5/24/19 to discuss any concerns I have about the PCA application process.        Date goal set:  5/14/19        I would like to have an [facility] worker to help me with my mental health concerns in the next 3 months.  (pt-stated)      Action steps to achieve this goal  1.  I will meet with the [facility] MSW on 5/24/19 to discuss the process of getting a [facility] worker.   2.  I will provide any necessary documentation and complete any necessary paperwork in a timely manner that may assist me with this goal.   3. In the meantime, I will continue to attend all scheduled appointments with my therapist to support my mental health.   Date goal set:  5/14/19            Clinic Care Coordination RN Assessed Needs  Patient Centered Assessment  Method-PCAM TOTAL SCORE: 33 (5/14/2019 11:16 AM)    Level 2:  A score of 25-36 indicates that the patient has a moderate initial need for RN or SW intervention at the discretion of the .  The RN will add this patient to her panel and follow closely in partnership with the care guide until stable.  She will reach out to the care guide for support in care coordination needs and graduate the patient to standard care guide outreach when appropriate.      PCAM (Patient Centered Assessment Method)   HEALTH AND WELL-BEING  Other Physical Health Concerns:: ESRD on dialysis, GERD, HTN  RN Assessment: Physical Health Needs: Mod to severe symptoms or problems that impact on daily life  RN Assessment: Physical Health Problems: Mod to severe impact upon mental well-being and preventing enjoyment of usual activities  Mental Health Concerns: Depression, Anxiety  RN Assessment:Other Mental Well-Being Concern: Mild problems- don't interfere with function  Lifestyle/Habit Concerns: Denies concerns that require further investigation  RN Assessment: Lifestyle Behaviors: Some mild concern of potential negative impact on well-being  SOCIAL ENVIRONMENT  Home Environment Concerns: Denies concerns that require further investigation  RN Assessment: Home Environment: Safe, stable, but with some inconsistency  RN Assessment: Daily Activites: Restricted participation with some degree of social isolation  Social Network Concerns: Socially isolated  RN Assessment: Social Network: Restricted participation with some degree of social isolation  Financial Status and Service Concerns: Disabled  RN Assessment: Financial Resources: Financially insecure, some resource challenges  HEALTH LITERACY AND COMMUNICATION  Understanding of Health and Wellbeing Concerns: Illiterate in English  RN Assessment: Health Literacy: Poor understanding with significant impact on ability to manage health  Engagement Concerns: Serious difficulties in communication,  with severe barriers  Other Engagement Concerns:: [place]  RN Assessment: Engagement: Serious difficulties in communication, with severe barriers  Barriers to Compliance with Medical Recommendations: Language, Transportation, Financial, Mental Illness  SERVICE COORDINATION  Other Services: Other care/services in place and adequate  Coordination of Services: Required care/services in place and adequately coordinated  PCAM TOTAL SCORE: 33      Emergency Plan  Understanding Anxiety Disorders  Almost everyone gets nervous now and then. It s normal to have knots in your stomach before a test, or for your heart to race on a first date. But an anxiety disorder is much more than a case of nerves. In fact, its symptoms may be overwhelming. But treatment can relieve many of these symptoms. Talking to your healthcare provider is the first step.  What are anxiety disorders?  An anxiety disorder causes intense feelings of panic and fear. These feelings may arise for no apparent reason. And they tend to recur again and again. They may prevent you from coping with life and cause you great distress. As a result, you may avoid anything that triggers your fear. In extreme cases, you may never leave the house. Anxiety disorders may cause other symptoms, such as:  Obsessive thoughts you can t control  Constant nightmares or painful thoughts of the past  Nausea, sweating, and muscle tension  Trouble sleeping or concentrating  What causes anxiety disorders?  Anxiety disorders tend to run in families. For some people, childhood abuse or neglect may play a role. For others, stressful life events or trauma may trigger anxiety disorders. Anxiety can trigger low self-esteem and poor coping skills.    Common anxiety disorders  Panic disorder. This causes an intense fear of being in danger.  Phobias. These are extreme fears of certain objects, places, or events.  Obsessive-compulsive disorder. This causes you to have unwanted thoughts  and urges. You also may perform certain actions over and over.  Posttraumatic stress disorder. This occurs in people who have survived a terrible ordeal. It can cause nightmares and flashbacks about the event.  Generalized anxiety disorder. This causes constant worry that can greatly disrupt your life.   Getting better  You may believe that nothing can help you. Or, you might fear what others may think. But most anxiety symptoms can be eased. Having an anxiety disorder is nothing to be ashamed of. Most people do best with treatment that combines medicine and therapy. These aren t cures. But they can help you live a healthier life.    6415-7534 [employer] [address] [place] [address], [place] [address]. All rights reserved. This information is not intended as a substitute for professional medical care. Always follow your healthcare professional's instructions.   Depression  Everyone feels down at times. The blues are a natural part of life. But an unhappy period that s intense or lasts for more than a couple of weeks can be a sign of depression. Depression is a serious illness. It can disrupt the lives of family and friends. If you know someone you think may be depressed, find out what you can do to help.    Know the serious signals  Warning signals for suicide include:    Threats or talk of suicide    Statements such as  I won t be a problem much longer  or  Nothing matters     Giving away possessions or making a will or  arrangements    Buying a gun or other weapon    Sudden, unexplained cheerfulness or calm after a period of depression  If you notice any of these signs, get help right away. Call a health care professional, mental health clinic, or suicide hotline and ask what action to take. In an emergency, don t hesitate to call the police.    [facility] Mental Health Crisis Lines:  [facility] [phone]  [facility] [phone]  [facility] [phone]  [facility]  [phone]  [facility], Adults [phone]  [facility], Children [phone]  [facility] Adults [phone]  [facility], Children [phone]    Chronic Kidney Disease (CKD)  Chronic kidney disease can result from many causes including infections, diabetes, high blood pressure, kidney stones, circulation problems, and reactions to medication. Having kidney disease means making many changes in your life. Learn as much as you can about it so that you can better adjust to these changes. It is important to remember that the main goal of treatment is to stop chronic kidney disease (CKD) from progressing to complete kidney failure. Treatments may vary based on the progression of CKD, so always follow your doctor's instructions in the care and management of your condition.  When to seek medical care  Call your doctor right away if you have any of the following:    Trouble eating or drinking    Weight loss of more than 2 pounds in 24 hours or more than 5 pounds in 7 days    Little or no urine output    Trouble breathing    Muscle aches    Fever of 100.4 F or higher, or chills    Blood in your urine or stool    Bloody discharge from your nose, mouth, or ears    Severe headache or a seizure    Vomiting    Swelling of legs or ankles    Chest pain (call 911)    Understanding Post-Traumatic Stress Disorder (PTSD)  You may have post-traumatic stress disorder (PTSD) if you ve been through a traumatic event and are having trouble dealing with it. Such events may include a car crash, rape, domestic violence,  combat, or violent crime. While it is normal to have some anxiety after such an event, it usually goes away in time. But with PTSD, the anxiety is more intense and keeps coming back. And the trauma is relived through nightmares, intrusive memories, and flashbacks (vivid memories that seem real). The symptoms of PTSD can cause problems with relationships and make it hard to cope with daily life.  But it can be treated. With help, you can feel better.  How does it feel?  Symptoms of PTSD often start within a few months of the event. Here are some common symptoms:  You startle more easily, and feel anxious and on edge all the time. This can lead to sleep problems. It a can cause you to feel overwhelmed or become angry or upset more easily. Panic attacks (sudden, intense feelings of terror and a strong need to escape from wherever you are) can also occur.  You relive the event through nightmares and flashbacks. During these, you may feel strong emotions and as though you re reliving the event all over again.  You avoid people, places, or activities that remind you of the trauma. You may hold in your feelings and become emotionally numb. It may be hard to concentrate at work or school or to relax with friends. You may be afraid to let people get close to you.  Who does it affect?  Not everyone who survives a trauma will have PTSD. But many will. In fact, millions of people have the condition. PTSD can happen to anyone, but it most often develops after a person feels his or her or another s life is threatened.  You re at risk for PTSD if you have experienced or witnessed:  A rape or sexual abuse  A mugging or carjacking  A car accident or plane crash  A life-threatening illness  War  Domestic violence  Childhood abuse  Natural disasters such as earthquakes, floods, or hurricanes  The sudden death of a loved one  Finding help  The first step is to talk to a trusted counselor or healthcare provider. He or she can help you take the next step to treatment. This may involve therapy (also called counseling) and medication.  Are you having suicidal thoughts?  You may be feeling helpless, hopeless, and that you can t go on. You may even have thoughts of suicide. But help is available. There are ways to ease this pain and manage the problems in your life.  If you are thinking about harming or killing yourself, please tell  your healthcare provider or someone you care about immediately or go to the nearest crisis walk-in center or emergency room.  You can also call, toll-free,  800-SUICIDE ([phone])   [phone] ([phone])     Resources  American Psychiatric Association  [phone]  www.healthyminds.org  American Psychological Association  www.apa.org/helpcenter  Anxiety and Depression Association of [place]  www.adaa.org  Mental Health [place]  www.nmha.org  National Center for PTSD  www.ptsd.va.gov  National Hawkins of Mental Health  www.nimh.nih.gov/topics/witse-rgwd-dkdp.shtml  National Suicide Prevention Lifeline  276.157.1033 (657-136-CTRW)  Www.suicidepreventionlifeline.org     Emergency Plan Recommendation:    When to Use the Emergency Department (ED)  An emergency means you could die if you don t get care quickly. Or you could be hurt permanently (disabled). Read below to know when to use -- and when not to use -- an emergency department (also called ED).    Dangers to your life  Here are examples of emergencies. These need immediate care:  A hard time breathing  Severe chest pain  Choking  Severe bleeding  Suddenly not able to move or speak  Blacking out (fainting)`  Poisoning    Dangers of permanent injuries  Here are other emergencies. These also need immediate care:  Deep cuts or severe burns  Broken bones, or sudden severe pain and swelling in a joint    When it s an emergency  If you have an emergency, follow these steps:    1. Go to the nearest emergency department  If you can, go to the hospital ED closest to you right away.  If you cannot get there right away, or if it is not safe to take yourself, call 911 or your police emergency number.  2. Call your primary care doctor  Tell your doctor about the emergency. Call within 24 hours of going to the ED.  If you cannot call, have someone call for you.  Go to your doctor (not the ED) for any follow-up care.    When it s not an emergency  If a problem is not  an emergency, follow these steps:    1. Call your primary care doctor  If you don t know the name of your doctor, call your health plan.  If you cannot call, have someone call for you.  2. Follow instructions  Your doctor will tell you what you should do.  You may be told to see your doctor right away. You may be told to go to the ED. Or you may be told to go to an urgent care center.  Follow your doctor s advice.

## 2021-05-28 NOTE — PROGRESS NOTES
Interp: Franci [id number]    Care Guide discussed enrolling in [facility] with Stantonlenny Gutierrez and they report they are interested in scheduling a [facility] RN Assessment.    [facility] RN Assessment scheduled for Tuesday 5/14 at 9am.      Next Outreach: Care Guide will call patient within 2 weeks of receiving the completed [facility] RN Assessment from the [facility] RN
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
## 2021-05-28 NOTE — PROGRESS NOTES
"Mental Health Visit Note    5/14/19    Start time: 1000   Stop Time: 1050   Session # 4    Session Type: Patient is presenting for an Individual session.    Reason for visit: Patient is a 33 y.o. male from [facility] who is being seen today for depressed mood related to his kidney failure awaiting transplant and receiving dialysis 3 times a week.    Individuals present: Patient, Therapist, Marcie     Follow-up  Patient reports that he is hopeful that at some point soon he will be able to officially obtain his [place] 's license.  He states, that by not being able to have the freedom to drive limits his ability to do the things he would like to do when he is not receiving dialysis.      New symptoms or complaints: Patient reports he is feeling very frustrated with the DMV as well as difficulties with transportation to dialysis.  Patient stated \"I feel helpless.\"    Functional Impairment:   Personal: 3  Family: 0  Work: 0  Social:3    Clinical assessment of mental status:   Grooming: Well groomed  Attire: Appropriate  Age: Appears Stated  Behavior Towards Examiner: Cooperative  Motor Activity: Within normal   Eye Contact: Appropriate  Mood: Euthymic  Affect: Congruent w/content of speech  Speech/Language: Within normal  Attention: Within normal  Concentration: Within normal  Thought Process: Within normal  Thought Content: Hallucinations: Within noraml  Delusions: Within normal  Orientation: X 3  Memory: No Evidence of Impairment  Judgement: No Evidence of Impairment  Estimated Intelligence: Average  Demonstrated Insight: Adequate  Fund of Knowledge: adequate      Suicidal/Homicidal Ideation present: None Reported This Session    Patient's impression of their current status: Patient stated \"sometimes I get frustrated but I know things will work out for me.\"     Therapist impression of patients current state:    Although, currently he is experiencing difficulties with being picked up from dialysis in a " timely fashion, he does appear to be much more optimistic and less frustrated while trying to sort out a solution to this issue.  We discussed during the session things that he can control and the things that are out of his control and how he can respond rather than react.    Type of psychotherapeutic technique provided: Client centered and Solution-focused    Progress toward short term goals: Goals were established during the session    Review of long term goals: Goals were established during the session    Diagnosis: Major Depressive Disorder (Severe)    Plan and Follow up:   1.  Patient has an appointment on May 24 with the  to assist him with the DMV        paperwork as well as help coordinate [facility]'S services.  2.  During today's session a call was made over to the dialysis center speak with her social        worker who sets up his transportation to and from dialysis.  Awaiting callback from      Dialysis social worker [phone].)  3.  Follow-up in 2 weeks.    Discharge Criteria/Planning: Patient will continue with follow-up until therapy can be discontinued without return of signs and symptoms.    Judith Fernandes [facility]  5/14/19

## 2021-05-28 NOTE — PROGRESS NOTES
"   Initial Psychotherapy Diagnostic Assessment     [x] Standard  [] Updated    Date(s): 17    patient has had 2 previous intake sessions prior to the completion of the DA.  Start Time: 1000  Stop Time: 1050    Patient Name:  Digna Gutierrez  Age: 33 y.o.   :  1985  MRN:  [id number]    Session Type: Patient is presenting for an Individual session.       Reason for Referral:  Mr. Gutierrez is a 33 y.o. year-old male who was referred by Charlee MCQUEEN  for an evaluation of cognitive, behavioral, and emotional functioning.  The patient was made aware of the role of psychology service in the patient's care, risks and benefits, and the limits of confidentiality. The patient agreed to proceed.         Persons Present: Patient, Therapist and     Presenting Problem/History:    Patient reports that he is feeling depressed due to his medical situation of renal failure.  Patient receives dialysis 3 times a week and is on the kidney transplant waiting list.  Patient stated, that he does not have family or friends who could be supportive to him.  He moved from [place] to [place] 2 years ago.    Patient s expectation for treatment   Patient stated that he would like to \"feel better and not be so sad\".  Patient is hoping to be able to get a kidney transplant in the near future and once again be able to work and provide for himself.          Functional Impairments:   Personal: 3  Family: 0  Work: 0  Social:3     How does the presenting problem affect patients daily functioning:    Patient reports that how he functions during the day really depends on how he is feeling.  And that also affects his mood when he is physically not feeling well he feels sad and depressed.    Issues/Stressors:       Physical Problems: Dizzines, Dry mouth, Sweating, Chest pain, Rapid heart pounding, Diarrhea, Nausea/Vomiting, Swallowing problems, Blurred vision, Headaches, Shortness of breath, Inability to sleep and Decreased energy    Social " Problems: Limited ability to partake in social activities.    Behavioral Problems: Past history of suicide attempts when he was in [place] [facility] years ago.    Cognitive Problems: Poor attention, Poor memory, Racing thoughts, Recurrent bad memories and Worries      Emotional Problems: Anxious, Angry, Irritable and Lack of self confidence     Onset/Frequency/Duration presenting problem symptoms:    The symptoms have been occurring since he found out he has kidney failure which resulted in then not being able to work.    How does the patient perceive his problem in relation to how others see his/her problem?    Patient is hopeful that some day he will be up and have 2 kidneys.    Family/Social History:     Marriages/Significant other:   He is currently single and lives with acquaintances.    Children: No children    Parents: Parents     Siblings: No siblings    Education: Fourth grade education    Developmental factors:   Patient is uncertain    Significant personal relationships including patient s evaluation of the relationship quality:    Does not have any close relationships    Sexual/physical/emotional/financial abuse/traumatic event:   The patient is a [facility] refugee who was born in [facility] [facility] [place], exposed to war trauma, lived in the  refugee camps and then came [place] in .. Patient reports being significantly impacted by the war and growing up poor, with little/no food and limited access to healthcare. The patient continues to be haunted by past memories and has difficulty discussing personal story due to the associated emotions.     Contextual Non-personal factors contributing to the patients concerns:    Due to refugee status, language barriers, socioeconomic status and lack of independent transportation, no-show appointments should be considered within the context of the patient's cultural framework.    Strengths/personal resources: Patient reports he is very determined. As evidenced by attempting 13 times  to take that [place] 's test    Support network(s)/Resources:     His Pentecostalism    Belief system:    Amish benoit    Cultural influences and impact on patient:    Patient stated that it has been very difficult living here and not speaking the language she has attempted to try to learn the English language.    Cultural impact on health and health care:     The patient reports cultural factors impacting pursuit of care.  He received very bad health care back in [facility] and that is why he came to the [place].    Current living situation:   Has been living with different individuals over the course of his time in [place].  More so since he is now without work because of his health condition.    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    Legal Problems:   Patient reports that when he lived in [place] he spent one night in longterm.      Patient Medical History  Mr. Gutierrez's medical history is significant for   Past Medical History:   Diagnosis Date     Chronic kidney disease      ESRD (end stage renal disease) (H)      Solitary kidney    .      Current Medications:  Current Outpatient Medications   Medication Sig     omeprazole (PRILOSEC) 20 MG capsule Take 1 capsule (20 mg total) by mouth daily.     polyethylene glycol (GLYCOLAX) 17 gram/dose powder Take 17 g by mouth daily.     simethicone (GAS-X) 80 MG chewable tablet Chew 1 tablet (80 mg total) 4 (four) times a day as needed for flatulence.     terazosin (HYTRIN) 2 MG capsule Take 1 capsule (2 mg total) by mouth daily.       Past Mental Health History:    Previous mental health diagnosis:  Major depressive disorder is indicated in the chart    Hx of Mental Health Treatment or Services:  No previous mental health counseling    Hx of MH Tx/Hospitalizations:   No history of mental health hospitalizations    Hx of Psychiatric Medications:  Patient does  not recall being on any previous psychotropic medications.    Self Report Measures:    On the Patient Health Questionnaire-9, a self report measure of depressive symptomatology, he obtained a score of 21 placing him in the range of severe depression.      On the Generalized Anxiety Disorder-7, a self-report measure of anxiety, he obtained a score of 21,  placing him in the range of severe anxiety.      Suicidal/Homicidal Risk Assessment:    Suicidal: No current suicidal thoughts.  Ideation: No ideations  History of Past Attempt(s): description: Over 10 years ago when he was in [place] he attempted to jump off a nelida although did not succeed.  Crisis Plan: Reviewed the hotline information and protocol if he were to have any current suicidal thoughts or ideations.    Homicidal: None reported by patient  Ideation: None reported by patient  History of Aggression towards others: None reported by patient  Crisis Plan:     Treasure Suicide Severity Risk Screen: Treasure suicide risk screen completed.  Patient does not have any current suicidal thoughts or ideations.  Acknowledged that over 10 years ago when he was in a refugee camp and conditions were unbearable he attempted suicide although unsuccessful.  Stated that his benoit is what keeps him from attempting now or in the future.  History of destruction to property:  Description: None reported by patient  Family Mental Health/Medical History    Family Mental Health:    Patient is not aware of any family mental health issues    Family history of Suicide:  Patient is unaware of any history of family suicide    Family history Chemical Dependency:    Patient is unaware of any family history of chemical dependency    Family Medical history:  Patient does not have any recollection of his family's medical issues.      Chemical Use/Abuse History    CAGE-AID (screening to determine a patients use/abuse/dependency):      0/4    1.  Have you ever felt you ought to cut down on your  drinking or drug use?  2.  Have people annoyed you by criticizing your drinking or drug use?  3.  Have you felt bad or guilty about your drinking or drug use?  4.  Have you ever had a drink or use drugs first thing in the morning to steady her nerves are to get rid of a hangover (eye-opener)?    Alcohol:   [x] None Reported    [] Yes   [] No       Street Drugs:   [x] None Reported    [] Yes   [] No    Prescription Drugs:   [] None Reported    [x] Yes   [] No  Type: Refer to MAR   Tobacco:   [x] None Reported    [] Yes   [] No  Caffeine:   [] None Reported    [x] Yes   [] No  Type: Coffee  Frequency Daily  Currently in a treatment program:   [] Yes   [x] No     History of CD Treatment:      [x] None Reported              BENITEZ Received:    [] Yes   [x] No       Collaborative info requested/received:   [] Yes   [x] No     MENTAL STATUS EVALUATION  Grooming: Well groomed  Attire: Appropriate  Age: Appears Stated  Behavior Towards Examiner: Cooperative  Motor Activity: Within normal   Eye Contact: Appropriate  Mood: Sad  Affect: Congruent w/content of speech  Speech/Language: Within normal  Attention: Within normal  Concentration: Within normal  Thought Process: Within normal  Thought Content: Hallucinations: Within noraml  Delusions: Within normal  Orientation: X 3  Memory: No Evidence of Impairment  Judgement: No Evidence of Impairment  Estimated Intelligence: Average  Demonstrated Insight: Adequate  Fund of Knowledge: adequate    Clinical Impressions/Assessment/Recommendations:  The patient is a 33 y.o. year-old, single male .Patient reports that he is feeling depressed due to his medical situation of renal failure.  Patient receives dialysis 3 times a week and is on the kidney transplant waiting list.  Patient stated, that he does not have family or friends who could be supportive to him. He moved from [place] to [place] 2 years ago and has been living place to place since coming to [place].     While he was a refugee in  [place] over 10 years ago, currently, he has no suicidal ideations or plan.  He had attempted suicide by trying to jump off a nelida although, it was unsuccessful. He is trying to be hopeful that he can soon receive a kidney transplant and then return to work.  Since moving to [place] another goal of his was to obtain a [place] 's license.  After multiple attempts, he finally did succeed in the past the written 's exam. Patient has had a long-standing history of depression and anxiety and states he is trying to stay as positive is he can under the circumstances.  Patient appears willing to participate in Psychotherapy to help with mood stabilization. He could also benefit from a Care Guide and the assistance of an [facility]'S Worker as well as, motivational interviewing, active listening, reassurance and support in the context of cognitive behavioral therapy to address the above.     Prioritization of needed mental Health ancillary or other services.   Patient's main priority is to establish care with psychotherapist to address symptoms of anxiety and depression.  Referral from PCP was for psychotherapy services.  Patient did not request psychiatry services upon intake.  At this time patient is not on any prescribing psychotropic medications for symptom management although, patient is aware that if symptoms become more difficult to manage he will follow-up with his PCP.    How Diagnostic criteria is met:  DSM-V criteria for major depressive disorder recurrent is based on the above noted scores indicating severe depression and severe anxiety as evidenced by disrupted sleep poor appetite recurring depressed mood, diminished pleasure in activities, trouble thinking and concentrating, excessive worry, irritability AND decreased socialization.  The patient completed the following questionnaires for session today:   WHODAS WHODAS 2.0 12-item version:  51.0%     Scores presented in qualifiers to represent level of  disability.  SEVERE Problem (high, extreme, ...) 50-95%     H1= 25  H2= 20  H3= 20     CAGEscoring (0/4)   PHQ-9, scoring 18 indicating severe depression  ANGEL-7  Scoring 21 indicating severe anxiety  C-SSRS. Although, patient had exhibited a suicidal jesture when living in a Refugee camp back in [facility] over 10 years ago, he stated that he does not have any current suicidal thoughts or ideations.     Recommendations   That pt. Be followed by a care guide who can assist in making a referral for a [facility] worker.   Diagnosis Major Depressive Disorder (recurrent severe)  Assessment of client resolving presenting mental health concerns:  Ability  [] low     [x] average     [] high  Motivation [] low     [x] average     [] high  Willingness [] low     [x] average     [] high  Sources/references used in completing this assessment:   Information was obtained by face-to-face interview, reviewing patient chart, adult intake questionnaire, and the measures completed which were C-SSRS, CAGE, PHQ-9 and ANGEL-7.  Initial Therapy Plan   1. Patient and therapist will develop therapeutic relationship.  2. Patient to present for follow up appointment to initiate psychotherapy services.  3. Develop comprehensive treatment plan  4.  Referral for Care Guide to follow and assist with setting up [facility]'S services.  Is patient's family involved in the treatment?  [x] No     [] Yes    If no, Why? No family in the [place]      Therapist s Signature/Supervision/co-signature statement:   Judith Fernandes [facility]  4/22/19

## 2021-05-29 NOTE — PROGRESS NOTES
Interp: [facility] [id number]    Attempt 1: CHW called patient and phone picked up but no one spoke.  If this patient is returning my call, please transfer to Malina Trevizo at ext [phone].      Next Outreach: 7/2/19     - Patient needs to be scheduled with the [facility] SW for an August appointment, preferably the first week of August   - Did you complete your [facility] intake assessment in the home yet?   - Were you approved for PCA hours?

## 2021-05-29 NOTE — PROGRESS NOTES
Interp: [id number]    Digna contacted the CHW about his concerns around his medical transportation not showing up today on time for his dialysis appointment.     He states that he would like to know if there are other transportation options and the CHW brought up [employer] and Digna reports he will talk more about [employer] with the [facility] on Tuesday 6/18 at 9am.      Next Outreach: 6/24/19

## 2021-05-29 NOTE — PROGRESS NOTES
Interp: Franci [id number]    CHW reviewed the [facility] Care Plan with Digna Gutierrez.    CHW reminded Digna of his [facility] SW appointment that is tomorrow morning at 9am.      Next Outreach: 6/25/19

## 2021-05-29 NOTE — PROGRESS NOTES
Mental Health Visit Note    5/28/19    Start time: 1400    Stop Time: 1450   Session # 5    Session Type: Patient is presenting for an Individual session.    Patient is a 33 y.o. Male from [facility] who is being seen today for depressed mood related to awaiting kidney transplant and receiving dialysis 3 times per week.  .   Individuals present Patient,Therapist, Nigerian     New symptoms or complaints: None    Functional Impairment:   Personal: 2  Family: 0  Work: 0  Social:2    Clinical assessment of mental status:   Grooming: Well groomed  Attire: Appropriate  Age: Appears Stated  Behavior Towards Examiner: Cooperative  Motor Activity: Within normal   Eye Contact: Appropriate  Mood: Euthymic  Affect: Congruent w/content of speech  Speech/Language: Within normal  Attention: Within normal  Concentration: Within normal  Thought Process: Within normal  Thought Content: Hallucinations: Within noraml  Delusions: Within normal  Orientation: X 3  Memory: No Evidence of Impairment  Judgement: No Evidence of Impairment  Estimated Intelligence: Average  Demonstrated Insight: Adequate  Fund of Knowledge: adequate      Suicidal/Homicidal Ideation present: None Reported This Session    Patient's impression of their current status: Patient stated that he is trying to be positive and is hopeful that at some point he can find a job while waiting for his kidney transplant.  Patient was able to follow through with his goals of meeting with the  and will be seeing her again in 2 weeks.    Therapist impression of patients current state: Patient appears very determined and driven to be self-sufficient and independent.  Although, patient is not certain when he will be receiving a new kidney.  He is able to put his energy into the task at hand.  He is open to receiving a arms worker to assist him through the process.  Patient appeared optimistic and hopeful today.    Type of psychotherapeutic technique provided: Client  centered and Solution-focused    Progress toward short term goals: Not done at today's session  Review of long term goals: Not done at today's visit    Diagnosis: Major depressive disorder (severe)    Plan and Follow up: Patient will follow-up in 2 to 3 weeks      Discharge Criteria/Planning: Patient will continue with follow-up until therapy can be discontinued without return of signs and symptoms.    Judith Fernandes [facility]  5/28/19

## 2021-05-29 NOTE — PROGRESS NOTES
"Assessment  Present for today's visit is pt,  Julio Crisostomo, and SW.    Pt met with [facility] RN for an assessment on 5/14/19. At this appt, pt expressed interest in an [facility] referral, assistance with establishing PCA services, and pursuing citizenship.    SW explained [facility] through [facility] and pt was agreeable to a referral. He thinks an [facility] worker who can assist him with mail will be especially helpful.    Pt told SW that he has already met with a woman from [facility] re: PCA Services. He reportedly filled out paperwork at the time of this meeting. He states that he is waiting to hear back.    Pt would like assistance contacting [facility] to pursue [place] Citizenship. Pt has taken citizenship classes and feels that he knows \"90% of the information.\" At this time, however, pt does not have any way to get to [facility] office to meet with an .     Pt is agreeable to returning to clinic on 6/18 to meet with HENRI LIGHT and complete [facility] intake process over the phone. He then plans to ask [facility] worker for transportation to [facility] office.    No other concerns identified.    Action Plan:    will:  - Submitted [facility] referral to [facility] for [facility] intake  - Scheduled pt to return for [facility] SW appointment on 6/18    Care Guide will:  - Assist pt in setting up transportation for appt with HENRI LIGHT on 6/18  - Continue with scheduled outreach        Goals        Patient Stated      I would like a referral for an [facility] worker within the next 90 days (pt-stated)      Action steps to achieve this goal  1.  HENRI LIGHT submitted [facility] referral to [facility] on 5/24/19.  2.  I will answer the phone when [facility] contacts me to set up initial [facility] intake appointment.  3.  In the meantime, I will continue to attend all scheduled appointments with my therapist to support and improve my mental health. (Continuous)  4.  I will communicate with [facility] CG at outreach and express " questions/concerns as they arise. (Continuous)       Date goal set:  5/14/19  Updated: 5/23/19; 5/24/19        I would like support and assistance applying for my [place] Citizenship within the next 90 days (pt-stated)      Action steps to achieve this goal  1.  I will meet with [facility] on 6/18/19 to complete [facility] intake process over the phone.  2.  In the meantime, will continue attending English/Citizenship classes. (Continuous)  3.  I will communicate with [facility] CG at outreach and express questions/concerns as they arise. (Continuous)    Date goal set: 5/14/19  Updated: 5/23/19; 5/24/19        I would like to establish PCA services within the next 90 days (pt-stated)      Action steps to achieve this goal  1.  I have filled out the application for PCA services with a woman from [facility]. I will call to inquire about the status of my application and/or wait to hear back from [facility].  2. I will communicate with [facility] CG at outreach and express questions/concerns as they arise. (Continuous)    Date goal set:  5/14/19  Updated: 5/23/19; 5/24/19

## 2021-05-29 NOTE — PROGRESS NOTES
Assessment  Present for today's visit is pt,  Nelson Swartz, and NADEGE.    SW assisted pt in calling the [place] Department of Public Safety to inquire about what steps must be taken in order to reinstate his eligibility for a drivers license.    Pt showed SW several letters from [place] Department of Public Safety and [place] Department of Motor Vehicles. From what SW can gather, pt was pulled over while driving with an  license (according to pt, this occurred in [place]) and needs to present proof that he paid the $100 fee associated with this offense in order to get another identification card/'s license.    SW obtained BENITEZ from pt to communicate with [place] Department of Motor Vehicles/Public Safety re: this matter. SW was unable to get ahold of an actual representative using the numbers provided on letters received from [place] while pt was in clinic today. The following phone numbers have been tried multiple times:      [phone]      NADEGE mailed a cover letter, pt's proof of payment (online receipt), and a copy of pt's listed compliance requirements along with a signed BENITEZ to:    [place] Department of Public Safety  Enforcement and Compliance Service  [address]  [place] [address]    According to [place], it may take up to 21 days for processing before pt's eligibility status is updated through the [place] Department of [place]icles.    In the meantime, [facility] states that they cannot issue pt any sort of identification card or license. Pt needs to update his address for his future identification card or license, but he cannot do so until his status has been reinstated as ELIGIBLE. Once this has been reinstated, pt must go to the DMV and fill out a physical change of address form.    Other things to note:    - Pt cannot pursue citizenship or complete [facility] intake until outstanding issue(s) with his license are resolved.     - Pt reports that he has  not heard anything from [facility] re: [facility] referral sent by NADEGE on 19. [facility] to follow up on this.    - Pt and SW completed applicant portion of the [employer] application today. Provider portion will be passed along to Dr. Zarate' care team for completion and mailing.    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 He says he is enjoying it and is currently working the night shift.        Action Plan:    will:  - Completed applicant portion of [employer] application  - Mailed required documents to [place] Department of Public [employer] [see note for additional detail]  - Remain available PRN      Care Guide will:  - Follow up on status of [facility] referral with [facility]  - At next outreach, schedule pt for a follow up appt with [facility] NADEGE  - See updated goals        Goals        Patient Stated      I would like a referral for an ARM[facility] worker within the next 90 days (pt-stated)      Action steps to achieve this goal  1.  HENRI LIGHT submitted [facility] referral to [facility] on 19.  2.  I will answer the phone when [facility] contacts me to set up initial [facility] intake appointment.  3.  In the meantime, I will continue to attend all scheduled appointments with my therapist to support and improve my mental health. (Continuous)  4.  I will communicate with [facility] at outreach and express questions/concerns as they arise. (Continuous)    NOTE: Patient has not heard from [facility] as of 19. Is patient still attending therapy? As of 19, SW does not see any upcoming appointments scheduled.     Date goal set:  19  Discussed/updated: 19; 19; 19        I would like assistance resolving outstanding issues/fees associated with my  [place] drivers license (pt-stated)      Action steps to achieve this goal  1.  On 19, [facility] NADEGE mailed required documents to [place] Department [place] Public [employer] [see note for additional detail].  2.  I will  let [facility] CG/SW know if I receive anything further from [place] Department of Public Safety and/or Motor Vehicles regarding outstanding issues. (Continuous)  3.  I will pay outstanding fees and provide any documentation or paperwork necessary to assist with this process. (Continuous)  4.  I will communicate with [facility] CG at outreach and express questions/concerns as they arise. (Continuous)    Date goal set:  6/18/19  Discussed/updated:        I would like support and assistance applying for my [place] Citizenship within the next 6 months (pt-stated)      Action steps to achieve this goal  1.  I will resolve issues with my [place] drivers license before scheduling an appointment with [facility] SW to complete [facility] phone intake.  2.  Once issues with my [place] drivers license are resolved, I will contact [facility] CG to set up an appointment with [facility] SW to complete [facility] phone intake.  3.  In the meantime, will continue attending English/Citizenship classes. (Continuous)  4.  I will communicate with [facility] at outreach and express questions/concerns as they arise. (Continuous)    NOTE: No further progress can be made on this goal until issue with pt's [place] drivers license is resolved.    Date goal set: 5/14/19  Discussed/updated: 5/23/19; 5/24/19; 6/18/19        I would like to establish PCA services within the next 90 days (pt-stated)      Action steps to achieve this goal  1.  I have filled out the application for PCA services with a woman from [facility]. I will call to inquire about the status of my application and/or wait to hear back from [facility].  2. I will communicate with [facility] CG at outreach and express questions/concerns as they arise. (Continuous)    Date goal set:  5/14/19  Discussed/updated: 5/23/19; 5/24/19; 6/18/19

## 2021-05-29 NOTE — PROGRESS NOTES
My Clinic Care Coordination Care Plan    [facility]  [address] [place]  [address]  [phone]      My Preferred Method of Contact:  Phone: [phone]    My Primary/Preferred Language:  Citizen of the [place]    Preferred Learning Style:  Face to face discussion, Pictures/Diagrams and Hands on teaching    Emergency Contact: Extended Emergency Contact Information  Primary Emergency Contact: Aurea Gutierrez   [place]  Mobile Phone: [phone]  Relation: Niece     PCP:  Montrell Zarate MD  Specialists:    Care Team            Montrell Zarate MD   [phone] PCP - General, Family Medicine    [facility] Care Coordination Care Guide, Primary Care - CC    PH: [phone]  Fax: [phone]          Hospitalizations and/or ED Visits  Most Recent: NA     Previous:  NA  Reason:  NA    Concerns regarding my health I would like to apply for SSI/SSDI, have an [facility] worker and find out if I qualify for PCA services.    Advanced Directive/Living Will: The patient was given information regarding Adanced Directives/Living Will      Digna elected to enroll in the [facility] Care Coordination.  Digna was given a copy of the Clinic Care Coordination brochure and full description of how to access their care team both during clinic hours and after hours.   My Care Guide's Name and Phone Number:  Malina Trevizo [phone]  The Care Guide and myself agreed to be in contact monthly.      Medication Update  The patient's medication list is up to date per patient    Health Maintenance and Immunization Update  The patient's preventive health screenings and immunizations are up to date per patient.    My Emergency Plan  Understanding Anxiety Disorders  Almost everyone gets nervous now and then. It s normal to have knots in your stomach before a test, or for your heart to race on a first date. But an anxiety disorder is much more than a case of nerves. In fact, its symptoms may be  overwhelming. But treatment can relieve many of these symptoms. Talking to your healthcare provider is the first step.  What are anxiety disorders?  An anxiety disorder causes intense feelings of panic and fear. These feelings may arise for no apparent reason. And they tend to recur again and again. They may prevent you from coping with life and cause you great distress. As a result, you may avoid anything that triggers your fear. In extreme cases, you may never leave the house. Anxiety disorders may cause other symptoms, such as:    Obsessive thoughts you can t control    Constant nightmares or painful thoughts of the past    Nausea, sweating, and muscle tension    Trouble sleeping or concentrating  What causes anxiety disorders?  Anxiety disorders tend to run in families. For some people, childhood abuse or neglect may play a role. For others, stressful life events or trauma may trigger anxiety disorders. Anxiety can trigger low self-esteem and poor coping skills.     Common anxiety disorders    Panic disorder. This causes an intense fear of being in danger.    Phobias. These are extreme fears of certain objects, places, or events.    Obsessive-compulsive disorder. This causes you to have unwanted thoughts and urges. You also may perform certain actions over and over.    Posttraumatic stress disorder. This occurs in people who have survived a terrible ordeal. It can cause nightmares and flashbacks about the event.    Generalized anxiety disorder. This causes constant worry that can greatly disrupt your life.   Getting better  You may believe that nothing can help you. Or, you might fear what others may think. But most anxiety symptoms can be eased. Having an anxiety disorder is nothing to be ashamed of. Most people do best with treatment that combines medicine and therapy. These aren t cures. But they can help you live a healthier life.    0305-9669 [employer]. [address], [place]  [address]. All rights reserved. This information is not intended as a substitute for professional medical care. Always follow your healthcare professional's instructions.   Depression  Everyone feels down at times. The blues are a natural part of life. But an unhappy period that s intense or lasts for more than a couple of weeks can be a sign of depression. Depression is a serious illness. It can disrupt the lives of family and friends. If you know someone you think may be depressed, find out what you can do to help.     Know the serious signals  Warning signals for suicide include:    Threats or talk of suicide    Statements such as  I won t be a problem much longer  or  Nothing matters     Giving away possessions or making a will or  arrangements    Buying a gun or other weapon    Sudden, unexplained cheerfulness or calm after a period of depression  If you notice any of these signs, get help right away. Call a health care professional, mental health clinic, or suicide hotline and ask what action to take. In an emergency, don t hesitate to call the police.     [facility] Mental Health Crisis Lines:  [facility] [phone]  [facility] [phone]  [facility] [phone]  [facility] [phone]  [facility], Adults [phone]  [facility], Children [phone]  [facility], Adults [phone]  [facility], Children [phone]     Chronic Kidney Disease (CKD)  Chronic kidney disease can result from many causes including infections, diabetes, high blood pressure, kidney stones, circulation problems, and reactions to medication. Having kidney disease means making many changes in your life. Learn as much as you can about it so that you can better adjust to these changes. It is important to remember that the main goal of treatment is to stop chronic kidney disease (CKD) from progressing to complete kidney failure. Treatments may vary based on the progression of CKD, so  always follow your doctor's instructions in the care and management of your condition.  When to seek medical care  Call your doctor right away if you have any of the following:    Trouble eating or drinking    Weight loss of more than 2 pounds in 24 hours or more than 5 pounds in 7 days    Little or no urine output    Trouble breathing    Muscle aches    Fever of 100.4 F or higher, or chills    Blood in your urine or stool    Bloody discharge from your nose, mouth, or ears    Severe headache or a seizure    Vomiting    Swelling of legs or ankles    Chest pain (call 911)     Understanding Post-Traumatic Stress Disorder (PTSD)  You may have post-traumatic stress disorder (PTSD) if you ve been through a traumatic event and are having trouble dealing with it. Such events may include a car crash, rape, domestic violence,  combat, or violent crime. While it is normal to have some anxiety after such an event, it usually goes away in time. But with PTSD, the anxiety is more intense and keeps coming back. And the trauma is relived through nightmares, intrusive memories, and flashbacks (vivid memories that seem real). The symptoms of PTSD can cause problems with relationships and make it hard to cope with daily life. But it can be treated. With help, you can feel better.  How does it feel?  Symptoms of PTSD often start within a few months of the event. Here are some common symptoms:    You startle more easily, and feel anxious and on edge all the time. This can lead to sleep problems. It a can cause you to feel overwhelmed or become angry or upset more easily. Panic attacks (sudden, intense feelings of terror and a strong need to escape from wherever you are) can also occur.    You relive the event through nightmares and flashbacks. During these, you may feel strong emotions and as though you re reliving the event all over again.    You avoid people, places, or activities that remind you of the trauma. You may hold in  your feelings and become emotionally numb. It may be hard to concentrate at work or school or to relax with friends. You may be afraid to let people get close to you.  Who does it affect?  Not everyone who survives a trauma will have PTSD. But many will. In fact, millions of people have the condition. PTSD can happen to anyone, but it most often develops after a person feels his or her or another s life is threatened.  You re at risk for PTSD if you have experienced or witnessed:    A rape or sexual abuse    A mugging or carjacking    A car accident or plane crash    A life-threatening illness    War    Domestic violence    Childhood abuse    Natural disasters such as earthquakes, floods, or hurricanes    The sudden death of a loved one  Finding help  The first step is to talk to a trusted counselor or healthcare provider. He or she can help you take the next step to treatment. This may involve therapy (also called counseling) and medication.  Are you having suicidal thoughts?  You may be feeling helpless, hopeless, and that you can t go on. You may even have thoughts of suicide. But help is available. There are ways to ease this pain and manage the problems in your life.  If you are thinking about harming or killing yourself, please tell your healthcare provider or someone you care about immediately or go to the nearest crisis walk-in center or emergency room.  You can also call, toll-free,    800-SUICIDE ([phone])     [phone] ([phone])      Resources    American Psychiatric Association  [phone]  www.healthyminds.org    American Psychological Association  www.apa.org/helpcenter    Anxiety and Depression Association of [place]  www.adaa.org    Mental Health [place]  www.nmha.org    National Center for PTSD  www.ptsd.va.gov    National [place] of Mental Health  www.nimh.nih.gov/topics/zdtzg-eclq-vtvp.shtml  National Suicide Prevention Lifeline  835.347.7749  (650-154-NEAR)  Www.suicidepreventionlifeline.org      Emergency Plan Recommendation:     When to Use the Emergency Department (ED)  An emergency means you could die if you don t get care quickly. Or you could be hurt permanently (disabled). Read below to know when to use -- and when not to use -- an emergency department (also called ED).     Dangers to your life  Here are examples of emergencies. These need immediate care:  A hard time breathing  Severe chest pain  Choking  Severe bleeding  Suddenly not able to move or speak  Blacking out (fainting)`  Poisoning     Dangers of permanent injuries  Here are other emergencies. These also need immediate care:  Deep cuts or severe burns  Broken bones, or sudden severe pain and swelling in a joint     When it s an emergency  If you have an emergency, follow these steps:     1. Go to the nearest emergency department  If you can, go to the hospital ED closest to you right away.  If you cannot get there right away, or if it is not safe to take yourself, call 911 or your police emergency number.  2. Call your primary care doctor  Tell your doctor about the emergency. Call within 24 hours of going to the ED.  If you cannot call, have someone call for you.  Go to your doctor (not the ED) for any follow-up care.     When it s not an emergency  If a problem is not an emergency, follow these steps:     1. Call your primary care doctor  If you don t know the name of your doctor, call your health plan.  If you cannot call, have someone call for you.  2. Follow instructions  Your doctor will tell you what you should do.  You may be told to see your doctor right away. You may be told to go to the ED. Or you may be told to go to an urgent care center.  Follow your doctor s advice.    All [facility] clinic patients have access to a Nurse 24 hours a day, 7 days a week.  If you have questions or want advice from a Nurse, please know [facility] is here for you.  You can call your clinic  and they will connect you or you can call [facility] at [phone].  [facility] also has Walk In Care clinics in multiple locations.  Call the number listed above for more information about our Walk In Care clinics or visit the [facility] website at [email or web address].    Patient Support  I will ask my emergency contact for help in supporting me in these goals.  Relationship to patient: Valerie Rangel  Cell # : [phone] , best time to call daytime

## 2021-05-30 NOTE — PROGRESS NOTES
Interp: Naw [id number]      Digna reports he did complete a PCA assessment but received no confirmation about whether he was approved and he states that he does not feel he needs PCA services anymore and would like this goal deleted.    Digna states he has not completed his [facility] intake assessment. CHW spoke with [facility] and the report Digna is still assigned to Krystyna in their system. CHW will continue to follow up on the [facility] goal.    [facility] NADEGE wants to see Digna in the first week of August and CHW assisted with scheduling this [facility] NADEGE appointment.      [facility] SW appointment Friday 8/9 at 11am        Next Outreach: 8/8/19     - Reminder about tomorrow's [facility] NADEGE appt at 11am   - [facility] worker assigned yet?

## 2021-05-31 NOTE — PROGRESS NOTES
"Interp: Adrian [id number]      No update on [facility] worker being assigned.    Reminder about today's 10:40am [facility] appointment. He knows medical transportation should be arriving very soon.    Reminder's provided about next weeks [facility] appointments on 9/4/19, 8am with the RN and 9am with the SW. Medical transportation has already been arranged.    CHW spoke with Krystyna and she is trying to coordinate a day she can see him with an  that works around Digna's dialysis schedule. She states she does have Sanie on her \"radar\" and does plan to see him soon. She states he did not decline [facility] ever it was just a matter of scheduling issues.      Next Outreach: 10/1/19        Plan:      - Has he been assigned an [facility] worker yet?  "

## 2021-05-31 NOTE — PROGRESS NOTES
Interp: Adrian [id number]    Digna called CHW and [facility] medical transportation did not show up this morning to bring him to his 8am [facility] appt. He would like appt cancelled and he will attend his 8/29 [facility] clinic appt.      Next Outreach: 8/29/19        Plan:      - Reminder about [facility] RN appointment next week Wednesday 9/4 8am with a f/u appt w/CCC SW   - Call Krystyna, has she assigned him an [facility] worker or did he decline [facility] again?

## 2021-05-31 NOTE — PROGRESS NOTES
Interp: [id number]    Digna called CHW and spoke in English, there was some confusion so his appt was cancelled and rescheduled for tomorrow. Then got a Marcie  on the phone and found out he had concerns about medical transportation to his [facility] appt tomorrow morning at 8am.    CHW called [employer] and set up transportation.      Next Outreach: 8/29/19        Plan:      - Reminder about [facility] RN appointment next week Wednesday 9/4 8am with a f/u appt w/CCC SW   - Call Krystyna, has she assigned him an [facility] worker or did he decline [facility] again?

## 2021-05-31 NOTE — PROGRESS NOTES
ASSESMENT AND PLAN:  Diagnoses and all orders for this visit:    Hypertensive urgency  Symptomatically improved but blood pressures continued to be very high.  Given his 2 recent hospitalizations, I am quite concerned about this and we are going to be following up closely tomorrow at the dialysis clinic and as detailed below.  Medication reconciliation and review and counseling done extensively today with the patient with the help of a professional .  Discussed options with the patient, reviewed the discharge summary from his recent hospitalizations- at [facility] via care everywhere and in our chart from [facility].  He is tolerating the 10 mg addition of amlodipine well.  He is taking lisinopril regularly now and not to the hydrochlorothiazide combination.  These changes were made at his most recent hospitalization.  He is taking 12.5 mg twice daily of Coreg and we decided to increase that to 25 mg twice daily to try to get better control of his currently significantly elevated blood pressures.  He is going to report these changes to his dialysis clinic tomorrow and they will let his nephrologist know.  Recheck with me in 1 month, follow-up with nephrology as directed.  -     amLODIPine (NORVASC) 10 MG tablet; Take 1 tablet (10 mg total) by mouth daily.  Dispense: 90 tablet; Refill: 3  -     carvedilol (COREG) 25 MG tablet; Take 1 tablet (25 mg total) by mouth 2 (two) times a day.  Dispense: 180 tablet; Refill: 3  -     lisinopril (PRINIVIL,ZESTRIL) 20 MG tablet; Take 1 tablet (20 mg total) by mouth daily.  Dispense: 90 tablet; Refill: 3    LVH (left ventricular hypertrophy)  Reviewed his echo results from his hospitalization which did show significant left ventricular hypertrophy.  He had a cardiac stress test that was negative for ischemia.  Counseled the patient that good control of blood pressure is the best thing we can do to work against progression of the left ventricular hypertrophy.    ESRD (end  stage renal disease) on dialysis (H)  Dialysis tomorrow, follow-up with his dialysis clinic and nephrology.    Slow transit constipation  -     senna-docusate (PERICOLACE) 8.6-50 mg tablet; Take 1-2 tablets by mouth daily as needed for constipation.  Dispense: 60 tablet; Refill: 11    Gastroesophageal reflux disease without esophagitis  -     omeprazole (PRILOSEC) 20 MG capsule; Take 1 capsule (20 mg total) by mouth daily before breakfast.  Dispense: 90 capsule; Refill: 3          SUBJECTIVE: 33-year-old male comes in for follow-up on 2 recent hospitalizations.  He was discharged about a week ago from [facility] for hypertensive urgency.  However, he developed recurrence of chest pain and was taken to [facility] where he was hospitalized and then discharged 2 days ago.  During that hospitalization, medication changes were made.  We were able to get his records via care everywhere.  Amlodipine 10 mg daily was added.  Hydrochlorthiazide was discontinued.  Patient has tolerated the amlodipine well.  His blood pressure is significantly elevated but his symptoms have resolved.  Since discharge from [facility], he has not had any headache or chest pain.  Patient is getting some pain in his right upper back.  It seems musculoskeletal, it worsens with movements of the arm shoulder and trunk.  He did have extensive work-up during his recent hospitalization which included negative CT scanning of the chest, no dissection or pulmonary embolism.  He did have an echo which showed extensive left ventricular hypertrophy.  Cardiac nuclear medicine stress testing was negative for ischemia.  Patient reports that he gets some constipation, if he does not use a stool softener he gets hard stools and some bloating and distention.  No blood in the stool or black tarry stools.  No vomiting.    Review of systems: No fevers since discharge, no headache since discharge, no focal new areas of numbness or weakness since discharge, remainder  "of review of systems is as above or negative.    Past Medical History:   Diagnosis Date     Chronic kidney disease      ESRD (end stage renal disease) (H)      Solitary kidney      Patient Active Problem List   Diagnosis     ESRD (end stage renal disease) on dialysis (H)     GERD (gastroesophageal reflux disease)     Polypharmacy     Slow transit constipation     Language barrier affecting health care     Cough with hemoptysis     Epigastric pain     Essential hypertension     Hypertensive urgency     ESRD (end stage renal disease) (H)     LVH (left ventricular hypertrophy)     Current Outpatient Medications   Medication Sig Dispense Refill     carvedilol (COREG) 25 MG tablet Take 1 tablet (25 mg total) by mouth 2 (two) times a day. 180 tablet 3     lisinopril-hydrochlorothiazide (PRINZIDE,ZESTORETIC) 20-12.5 mg per tablet Take 1 tablet by mouth daily. 90 tablet 0     omeprazole (PRILOSEC) 20 MG capsule Take 1 capsule (20 mg total) by mouth daily before breakfast. 90 capsule 3     senna-docusate (PERICOLACE) 8.6-50 mg tablet Take 1-2 tablets by mouth daily as needed for constipation. 60 tablet 11     amLODIPine (NORVASC) 10 MG tablet Take 1 tablet (10 mg total) by mouth daily. 90 tablet 3     calcium, as carbonate, (TUMS) 200 mg calcium (500 mg) chewable tablet Chew 2 tablets 3 (three) times a day.       lisinopril (PRINIVIL,ZESTRIL) 20 MG tablet Take 1 tablet (20 mg total) by mouth daily. 90 tablet 3     No current facility-administered medications for this visit.      Social History     Tobacco Use   Smoking Status Never Smoker   Smokeless Tobacco Never Used       OBJECTICE: BP (!) 184/90   Pulse 83   Temp 98.6  F (37  C) (Oral)   Resp 20   Ht 5' 2\" (1.575 m)   Wt 139 lb 8 oz (63.3 kg)   SpO2 96%   BMI 25.51 kg/m       No results found for this or any previous visit (from the past 24 hour(s)).     GEN-alert, appropriate, in no apparent distress   HEENT-mucous memories are moist, sclera are clear, neck is " supple   CV-regular rate and rhythm with no murmur   RESP-lungs clear to auscultation   ABDOMINAL-soft and nontender   EXTREM-warm with no ankle edema   SKIN-no ulcers or vesicles   Neurologic- cranial nerves II through XII are intact, strength and sensation are symmetric.   Musculoskeletal-no midline spinal tenderness, he has some mild tenderness and tightness to palpation of his right upper paraspinous muscles.      Montrell Zarate

## 2021-05-31 NOTE — PROGRESS NOTES
TCM DISCHARGE FOLLOW UP CALL    Discharge Date:  8/20/2019  Reason for hospital stay (discharge diagnosis)::  HTN urgency, ESRD  Are you feeling better, the same or worse since your discharge?:  Patient is feeling better (Denies H/A, CP.. He doesn't check BP at home.)  Do you feel like you have a plan in the event of a health emergency?: Yes (Family)    As part of your discharge plan, were  home care services ordered for you?: No    Did you receive any new medications, or was there a change to your medications?: Yes    Are you taking those medications, or do you have any established regiment?:  Pt reports he has three meds from pharmacy (two new meds were ordered). States he is aware of which one is for his BP (Prinzide) and for his stomach (omeprazole). He is taking BP med at HS and omeprazole in AM. Pt states he has all his meds now.  Do you have any follow up visits scheduled with your PCP or Specialist?:  Yes, with PCP  (RN) Is PCP appt scheduled soon enough (within 14 days of discharge date)?: Yes (8/23)    RN NOTES::  Pt will check with [facility] to see if transportation was arranged

## 2021-05-31 NOTE — PROGRESS NOTES
Clinic Care Coordination Contact    Follow Up Progress Note      Assessment: F/u post ED visit on 8/19/19 - 8/20/19 elevated B/P    Goals addressed this encounter:   Goals Addressed     None           Intervention/Education provided during outreach:   Spoke with patient via phone.   States he's at dialysis and B/P still in renuka in 180s. States dialysis staff are aware and adjust meds.  States would like to meet with [facility] RN to learn how to check B/P           Plan:   1) Scheduled to [facility] RN on 9/4/19   2) F/U appt with [facility] SW on 9/4/19

## 2021-05-31 NOTE — PROGRESS NOTES
Interp: Marielosw [id number]    Reminder provided about tomorrow's [facility] SW appointment at [facility] at 11am.    Was not home when Krystyna came from [facility] to complete the [facility] intake assessment.     CHW left message for Krystyna at [facility] regarding Digna still wanting an [facility] worker. CHW provided the best time to call him is T, TH and Saturday as he is available all day on those days.    No longer needs PCA services.      Next Outreach: 9/10/19      Plan:     - Was [facility] intake assessment completed?   - Any update on other goal of resolving [place] 's license concerns?

## 2021-05-31 NOTE — PROGRESS NOTES
Digna called CHW and states no transportation showed up for his [facility] SW appt that is scheduled for today.    Re-scheduled to 9/4 at 9am. Has dialysis on Wednesdays at 12 noon so medical transportation needs to know that he has two appointments on the same day.    CHW will set up a Remind Me message to call [employer] to confirm medical transportation to the 9/4/19 [facility] SW appt.      Next Outreach: 8/29/19      Plan:     - Was Replaced by [facility] intake assessment completed?   - Let him know that medical transportation was set up for 9/4 [facility] appt.

## 2021-05-31 NOTE — PROGRESS NOTES
Clinic Care Coordination Contact    8/8/19:     [facility] NADEGE received a phone call from Anna with the [place] [employer] on 8/8/19 regarding pt's license having been revoked and next steps to reinstate/resolve legal issues. The offense in question occurred on 8/26/17 and the ticket number associated with this offense is as follows: [id number]. Anna informed NADEGE that this matter involves a traffic violation for speeding.    Anna instructed NADEGE to contact the [employer] in [facility] at [phone] to request proof of compliance/payment and have the receipt faxed to [phone].    8/9/19:    [facility] N[facility]GE contacted [employer] in [facility] at [phone] to request proof of compliance/payment regarding pt's speeding ticket. NADEGE was informed that there is a warrant out for pt's arrest due to failing to pay the speeding ticket fine and failing to appear in court on 12/18/17 regarding this matter.    Representative from [facility] informed  that pt would need to send a written declaration (emailed to: [email or web address]) with his signature attached stating that he is pleading guilty to the speeding charge and that if he pays the ticket in full ($198.50) over the phone via debit or credit card, the [employer] will immediately resolve the warrant out for his arrest and his license will become eligible for reinstatement within 14-21 days, once processed by the [place] [employer].    Once paid, proof of compliance/payment must be faxed to [place] [employer] at [phone].    - - -    Pt is scheduled to see NADEGE on 9/4/19 for assistance resolving this matter. Goal updated to reflect this progress.    Goals        Patient Stated      I would like a referral for an [facility] worker within the next 90 days (pt-stated)      Action steps to achieve this goal  1.  [facility] submitted [facility] referral to [facility] on 19 and at this time I am assigned to Krystyna at [facility] and have not been assigned an [facility] worker.  2.  In the meantime, I will continue to attend all scheduled appointments with my therapist to support and improve my mental health. (Continuous)    NOTE: Patient has not heard from [facility] as of 19. Is patient still attending therapy? As of 19,  does not see any upcoming appointments scheduled.     Date goal set:  19  Discussed/updated: 19; 19; 19; 19        I would like assistance resolving outstanding issues/fees associated with my  [place] drivers license (pt-stated)      Action steps to achieve this goal  1.  I will meet with [facility] on 19 to discuss resolution of speeding ticket received in 2017.  2.  I will pay any outstanding fees and provide any documentation or paperwork necessary to assist with this process. (Continuous)    NOTE: See  documentation from 19 for further information.    Date goal set:  19  Discussed/updated: 19; 19        I would like support and assistance applying for my [place] Citizenship within the next 6 months (pt-stated)      (GOAL ON HOLD): No further progress can be made on this goal until issue with pt's [place] drivers license is resolved.    Action steps to achieve this goal  1.  I will resolve issues with my [place] drivers license before scheduling an appointment with [facility] to complete [facility] phone intake.  2.  Once issues with my [place] drivers license are resolved, I will contact [facility] to set up an appointment with [facility]  3.  In the meantime, will continue attending English/Citizenship classes. (Continuous)  4.  I will communicate with [facility] at outreach and express questions/concerns as they arise. (Continuous)        Date goal set: 19  Discussed/updated: 19; 19; 19

## 2021-05-31 NOTE — PROGRESS NOTES
HPI - 34 yo male who is new to me. He has complex medical issues, including on hemodialysis  is here to f/u on HTN.    HTN -   Out of meds for a few months    Polyhpharmacy:  He reports going to pharmacy to get meds yesterday but they told him they did not have his name in there system.   Per chart, Lisinopril was ordered 4/30/19.   Per pharmacy, last fill was 5/30/19.   He reports that when he tried to get meds June and July and pharmacy reports not getting the rx.   He reports talking with clinic and was told the meds has been sent. He is part of the [facility] care team.   He wants to change pharmacy to get free delivery and avoid confusion with [facility] [address]    - meds reviewed -   He has terazosin on med list but unclear why. Not mentioned in clinic note by the provider who ordered it on 3/6/19. No dx of BPH or prostate issues in chart. His low urine could be related to end-stage renal disease    Constipation -   miralax three times a day  is not helping  No BM all week   LLQ pain     GERD - med list has omeprazole and simethicone that he reports is not helping   He reports buying simethicone OTC b/c not covered  Omeprazole ran out  Awhile ago.       Shortness of breath/night sweats/fever and hemoptysis x 3 weeks  Sx worse at night  No TB labs in chart    Current Outpatient Medications   Medication Sig     lisinopril (PRINIVIL,ZESTRIL) 20 MG tablet Take 20 mg by mouth daily.     omeprazole (PRILOSEC) 20 MG capsule Take 1 capsule (20 mg total) by mouth daily.     polyethylene glycol (GLYCOLAX) 17 gram/dose powder Take 17 g by mouth daily.     simethicone (GAS-X) 80 MG chewable tablet Chew 1 tablet (80 mg total) 4 (four) times a day as needed for flatulence.     terazosin (HYTRIN) 2 MG capsule Take 1 capsule (2 mg total) by mouth daily.         Vitals:    08/15/19 1030 08/15/19 1045   BP: (!) 190/94 (!) 194/100   Pulse: (!) 58    Resp: 14    Temp: 98.1  F (36.7  C)    TempSrc: Oral    SpO2:  "99%    Weight: 141 lb 6.4 oz (64.1 kg)    Height: 5' 2.01\" (1.575 m)      OBJECTIVE:  Vitals listed above within normal limits  General appearance: well groomed, pleasant, well hydrated, nontoxic appearing  ENT: PERRL, throat clear  Neck: neck supple, no lymphadenopathy, no thyromegaly  Lungs: lungs clear to auscultation bilaterally, no wheezes or rhonchi  Heart: regular rate and rhythm, no murmurs, rubs or gallops  Abdomen: soft, nontender, LLQ firmness   Neuro: no focal deficits, CN II-XII grossly intact, alert and oriented  Psych:  mood stable, appears to have good insight and judgment    Xr Chest 2 Views    Result Date: 8/15/2019  EXAM: XR CHEST 2 VIEWS LOCATION: [facility] DATE/TIME: 8/15/2019 11:40 AM INDICATION: Hemoptysis COMPARISON: 2/21/2019 FINDINGS: Cardiomegaly, increased from previous. Normal pulmonary vascularity. The lungs and pleural spaces are clear.    Xr Abdomen 2 Views    Result Date: 8/15/2019  EXAM: XR ABDOMEN 2 VIEWS LOCATION: [facility] DATE/TIME: 8/15/2019 11:40 AM INDICATION: Slow transit constipation COMPARISON: None. FINDINGS: Large amount of stool throughout the colon consistent with constipation. No bowel obstruction or free intraperitoneal air.     Recent Results (from the past 1008 hour(s))   HM1 (CBC with Diff)    Collection Time: 08/15/19 11:30 AM   Result Value Ref Range    WBC 5.5 4.0 - 11.0 thou/uL    RBC 3.84 (L) 4.40 - 6.20 mill/uL    Hemoglobin 11.5 (L) 14.0 - 18.0 g/dL    Hematocrit 34.0 (L) 40.0 - 54.0 %    MCV 89 80 - 100 fL    MCH 30.0 27.0 - 34.0 pg    MCHC 33.8 32.0 - 36.0 g/dL    RDW 13.8 11.0 - 14.5 %    Platelets 139 (L) 140 - 440 thou/uL    MPV 7.0 7.0 - 10.0 fL    Neutrophils % 63 50 - 70 %    Lymphocytes % 28 20 - 40 %    Monocytes % 5 2 - 10 %    Eosinophils % 4 0 - 6 %    Basophils % 0 0 - 2 %    Neutrophils Absolute 3.5 2.0 - 7.7 thou/uL    Lymphocytes Absolute 1.6 0.8 - 4.4 thou/uL    Monocytes Absolute 0.3 0.0 - 0.9 thou/uL    Eosinophils Absolute " 0.2 0.0 - 0.4 thou/uL    Basophils Absolute 0.0 0.0 - 0.2 thou/uL         A/P  HTN -  Start meds and  change med to lisinopril - HCTZ  RTC for BP check next week    Constipation and LLQ pain related constipation  AXR showed large amount of stool  Start senna-colace  Continue miralax  Encouraged hydration and vegetables    GERD - change med to ranitidine   Stopped omeprazole and simethicone    Hemoptysis   CXR - some cardiomegaly but not infection  No elevated WBC  QFT pending    Decreased urine output -   Likely related to ESRD  Stopped terazosin    Polypharmacy -   Updated meds and med list  [facility] for [facility]        Spent 40 min face to face with patient with more the 50% spent in counseling, reviewing chart with patient, and coordination of care, medication reconciliation and discussing problems listed above.

## 2021-06-01 VITALS — BODY MASS INDEX: 26.05 KG/M2 | WEIGHT: 147 LBS | HEIGHT: 63 IN

## 2021-06-01 VITALS — WEIGHT: 147.5 LBS | HEIGHT: 63 IN | BODY MASS INDEX: 26.13 KG/M2

## 2021-06-01 VITALS — HEIGHT: 62 IN | BODY MASS INDEX: 27.05 KG/M2 | WEIGHT: 147 LBS

## 2021-06-01 NOTE — TELEPHONE ENCOUNTER
Request for Orders    Who s Requesting: Home Care Registered Nurse    Orders being requested: SN 2x2w, 1x4w medication mgmt/education, HTN, ESRD, GI/, safety    MSW community resources    Where to send Orders: epic

## 2021-06-01 NOTE — PROGRESS NOTES
Programs Applying For: SNAP/CASH  County:  Case #:  [facility] Worker:   Ann-Marie #:   PMI #:   Tracking:   Date Applied:     Outreach:   2019: FRW met with patient at [facility] and applied for SNAP/CASH. Patient did not bring bank statements and will bring them to  at [place] with FRW business card. FRW has application and will fax to [facility] once the bank statements are received.  2019: FRW called patient on referral to screen and make appointment. Appointment made for  @ 10:00, outlook invitation sent to CHW.   2019: Referral Call: Attempt 1 Financial Resource Worker called and left a message for the patient. If the patient is returning my call, please transfer the patient to Adrienne Mahmood  at ext. [phone]      Health Insurance Information:   SNAP/CASH Application Screenin. Have you had county benefits before? Yes  2. How many people in the household, do you eat/buy food together?  3. What is your monthly income (include all tax members)?   4. Do you have a bank account?   5. Do you have any utility bills (electricity, rent, mortgage, phone, insurance, medical bills, etc.)?   6. Do you have social security cards and/or green cards?     Referral:   Hi,     Patient is enrolled into [facility] and met with the SW yesterday. He reports he is no longer working and would to reapply for his Cash and SNAP benefits.     Thank you,     Malina

## 2021-06-01 NOTE — PROGRESS NOTES
See FR notes in chart.   [facility] Financial Resource Guide  [phone]

## 2021-06-01 NOTE — TELEPHONE ENCOUNTER
CHW spoke with [employer] who report they do not have the patient in their system as needing a ride today to [facility].    CHW spoke with Carlton Thomas who report no trip information to [facility] this morning for the 8am or 9am appointment times.    CHW scheduled a ride to the 9am  appointment that patient has, this will be with [employer] ([phone]), trip conf#: [id number]. They report that patient may be late to the 9am appointment as it will take [employer] 15-30 minutes to get the request and then assign a .

## 2021-06-01 NOTE — PROGRESS NOTES
Clinic Care Coordination Contact    Follow Up Progress Note      Assessment: f/u on who has been coming out setting up meds and checking B/P since discharged from [facility] on 19    A paramedic with community paramedic program with [employer] called writer today while visiting patient in home stating that he has been coming out to see patient since patient was discharged from [facility]. Today is his last day with the patient - only 30 days f/u with patient post hospitalization or each referral.     States patient will needs ongoing home care nurse to set up his meds. Requesting to place a home care referral.     [facility] he sets up meds x 1 week.     Goals addressed this encounter:   Goals        Patient Stated      Financial Wellbeing (pt-stated)      I would like to meet with clinic FRG for assistance re-applying for SNAP benefits, CASH assistance, etc. within the next 30 days    Action steps to achieve this goal  1.  I will answer the phone when [facility] contacts me to schedule an appointment with clinic FRG.  2.  I will return to [place] on [TBD] to meet with clinic FRG.  3.  I will communicate with CHW at outreach and express questions/concerns as they arise. (Continuous)    Date goal set: 19  Discussed/updated:        I would like a referral for an [facility] worker within the next 90 days (pt-stated)      Action steps to achieve this goal  1.  My CHW will follow up with Krystyna from [facility] regarding the [facility] referral.     Date goal set:  19  Discussed/updated: 19; 19; 19; 19; 19; 19        I would like assistance resolving outstanding issues/fees associated with my  [place] drivers license (pt-stated)      Action steps to achieve this goal  1.  I will meet with CCC NADEGE again on 10/14/19 to discuss resolution of speeding ticket received in 2017.   2.  I will bring a valid money order to my appointment with CCC RN on 9/10/19; once received, NADEGE  will mail this to [facility] [address] for me.  3.  I will pay any outstanding fees and provide any documentation or paperwork necessary to assist with this process. (Continuous)    NOTE: See NADEGE documentation from 9/4/19 for further information.    Date goal set:  6/18/19  Discussed/updated: 7/2/19; 8/8/19; 8/29/19; 9/4/19        I would like CCC RN to coordinate care with [facility] re: kidney transplant in the next 90 days.  (pt-stated)      Action steps to achieve this goal  1.  I will speak with CCC RN at outreach calls  2. CCC  RN will reach out to [facility] kidney transplant team to find out if patient is on the list - CCC RN left a message for NADEGE Tripp with [facility] at [phone], pending.   3. I will call CCC RN with any concerns or questions.    Date goal set:  9/10/19        I would like CCC RN to teach me how to take my meds correctly within the next 60 days (pt-stated)      Action steps to achieve this goal    1.  I will speak with CCC RN at outreach calls  2. I will attend my appt with CCC RN on 9/10/19 at 3pm.   3. I will take all my medications as set up by CCC RN.   4. I will check my B/P daily + PRN when I have the blood pressure cuff - CCC RN will check to see if insurance will pay for it.  5. I will call CCC RN or CHW with any concerns or questions.     Date goal set:  9/4/19  Discussed/updated:        I would like support and assistance applying for my [place] Citizenship within the next 6 months (pt-stated)      (GOAL ON HOLD): No further progress can be made on this goal until issue with pt's [place] drivers license is resolved.    Action steps to achieve this goal  1.  I will resolve issues with my [place] drivers license before scheduling an appointment with [facility] NADEGE to complete [facility] phone intake.  2.  Once issues with my [place] drivers license are resolved, I will contact [facility] to set up an appointment with [facility] NADEGE to [facility].  3.  In the meantime, will continue attending  English/Citizenship classes. (Continuous)  4.  I will communicate with CCC CG at outreach and express questions/concerns as they arise. (Continuous)      Date goal set: 5/14/19  Discussed/updated: 5/23/19; 5/24/19; 6/18/19        I would like to establish care with home care in the next 7 days.  (pt-stated)      Action steps to achieve this goal  1.  I will speak with CHW at outreach calls.  2. I will answer my phone or return call back in a timely manner to set up start of care with home care  3. I will call CHW with any concerns or questions.     Date goal set:  9/13/19                    Plan:   1) A referral placed for home care today. CHW to check on status.

## 2021-06-01 NOTE — PROGRESS NOTES
Programs Applying For: SNAP/CASH  County:  Case #:  [facility] Worker:   Ann-Marie #:   PMI #:   Tracking:   Date Applied:     Outreach:  10/16/2019: FRW has not received verifications from patient yet. FRW called patient for a status on when the verifications will be ready. Patient stated he is unable to gather any information. FRW will send application as is and patient will get letter from financial worker at the [facility] with what verifications they need to process application. FRW faxed application to [facility] and will make outreach in 2 weeks on status update.   10/2/2019: FRW looked at CHW notes, patient is still gathering verifications for FRW. FRW will move outreach to another week or two   CLOSED ENCOUNTER:   2019: FRW met with patient at [facility] and applied for SNAP/CASH. Patient did not bring bank statements and will bring them to  at [place] with FRW business card. FRW has application and will fax to [facility] once the bank statements are received.  2019: FRW called patient on referral to screen and make appointment. Appointment made for  @ 10:00, outlook invitation sent to CHW.   2019: Referral Call: Attempt 1 Financial Resource Worker called and left a message for the patient. If the patient is returning my call, please transfer the patient to Adrienne Mahmood  at ext. [phone]      Health Insurance Information:   SNAP/CASH Application Screenin. Have you had county benefits before? Yes  2. How many people in the household, do you eat/buy food together?  3. What is your monthly income (include all tax members)?   4. Do you have a bank account?   5. Do you have any utility bills (electricity, rent, mortgage, phone, insurance, medical bills, etc.)?   6. Do you have social security cards and/or green cards?     Referral:   Hi,     Patient is enrolled into [facility] and met with the SW yesterday. He reports he is no longer working and would to reapply for his Cash and SNAP  benefits.     Thank you,     Malina

## 2021-06-01 NOTE — PROGRESS NOTES
Clinic Care Coordination Contact    Clinic Care Coordination Medication Education FOLLOW UP Visit    Patient presents for:  Medication education, Pill box teaching, Compliance monitoring and Medication reconciliation    Language: Chika    Communication: Literate in other languages    Accompanied by: unaccompanied    Patient Living Situation:  Independent    Primary Care Provider:  Montrell Zarate MD    Barriers:  Financial, Language, Cultural, Poor insight into disease process, Inadequate support at home, Non-compliance of medications and Multiple uncontrolled disease states    Medication List (see cited below): Patient presents with all ordered medications    Current Outpatient Medications   Medication Sig     amLODIPine (NORVASC) 10 MG tablet Take 1 tablet (10 mg total) by mouth daily.     calcium, as carbonate, (TUMS) 200 mg calcium (500 mg) chewable tablet Chew 2 tablets 3 (three) times a day.     carvedilol (COREG) 25 MG tablet Take 1 tablet (25 mg total) by mouth 2 (two) times a day.     lisinopril (PRINIVIL,ZESTRIL) 20 MG tablet Take 1 tablet (20 mg total) by mouth daily.     lisinopril-hydrochlorothiazide (PRINZIDE,ZESTORETIC) 20-12.5 mg per tablet Take 1 tablet by mouth daily.     omeprazole (PRILOSEC) 20 MG capsule Take 1 capsule (20 mg total) by mouth daily before breakfast.     senna-docusate (PERICOLACE) 8.6-50 mg tablet Take 1-2 tablets by mouth daily as needed for constipation.       Equipment: Basic pill box- twice daily dosing    Pill Box was CCC RN    Medication Set Up: Dependent  Medication Administration:  Independent    Compliance: 80%    Future Appointments   Date Time Provider Department [place]   9/10/2019  3:00 PM [facility]   10/7/2019 11:00 AM Montrell Zarate MD RL[facility]       Action Plan  RN Will:  Next MEV on 9/10/19 at 3pm    Care Guide Will:  Care Guide Delegation      Nursing Notes:      Medrec and med reviewed with patient today.    Patient brought in 4  prescription bottles with him today all of them were filled in August, 2019.     1) Amlodipine 10mg (1) tabs daily    2) Tums - no bottle - reports lots of gas in his stomach- will ask PCP to send new script to pharmacy    3) Carvedilol 12.5mg - reports taking (1) tab two times a day -  PATIENT WAS SEEN BY DR ZARATE ON 8/29/19 AND CARVEDILOL WAS INCREASED TO 25MG two times a day - Patient states he has been taking 12.5mg two times a day since last time seen by Dr. Zarate.     4) Lisinopril 20mg (1) tab daily    5) Omeprazole 20mg (1) cap daily - instructed to take 30 mins- 1 hr before breakfast. - reports lots of gas in stomach     6) Lisinopril -HCTZ - still on med list but was stopped last month by Jacy LINCOLN.     7) Senna S - not taking  ~~~~~  Writer provided patient 2x/day med box and set up med x1 week. MSU as below:  1) Amlodipine 10mg (1) tab - AM    2) Tums - none - would like refill - message sent to PCP    3) Carvedilol 12.5mg (2) tabs two times a day- AM & PM - only enough for 5 days. Patient will  Carvedilol 25mg tab and will add to the med box. Will call CCC RN with any questions or concerns. Per pharmacy ready to be picked up.    4) Lisinopril 20mg (1) tab - AM    5) Omeprazole 20mg (1) tab - AM    6) Senna S - no bottle but per pharmacy ready to be picked up.     Went to ED x2 the past month for elevated B/P. Patient reports his b/p has been in the high range 180s.     Agrees to come back on 9/10/19 to f/u on med compliance and blood pressure - will discuss to see if insurance will pay for blood pressure cuff.     Message sent to PCP for Tums refill.

## 2021-06-01 NOTE — PROGRESS NOTES
Clinic Care Coordination Contact     Clinic Care Coordination Medication Education FOLLOW UP Visit     Patient presents for:  Medication education, Pill box teaching, Compliance monitoring and Medication reconciliation     Language: Chika     Communication: Literate in other languages     Accompanied by: unaccompanied     Patient Living Situation:  Independent     Primary Care Provider:  Montrell Zarate MD     Barriers:  Financial, Language, Cultural, Poor insight into disease process, Inadequate support at home, Non-compliance of medications and Multiple uncontrolled disease states     Medication List (see cited below): Patient presents with all ordered medications          Current Outpatient Medications   Medication Sig     amLODIPine (NORVASC) 10 MG tablet Take 1 tablet (10 mg total) by mouth daily.     calcium, as carbonate, (TUMS) 200 mg calcium (500 mg) chewable tablet Chew 2 tablets 3 (three) times a day.     carvedilol (COREG) 25 MG tablet Take 1 tablet (25 mg total) by mouth 2 (two) times a day.     lisinopril (PRINIVIL,ZESTRIL) 20 MG tablet Take 1 tablet (20 mg total) by mouth daily.     lisinopril-hydrochlorothiazide (PRINZIDE,ZESTORETIC) 20-12.5 mg per tablet Take 1 tablet by mouth daily.     omeprazole (PRILOSEC) 20 MG capsule Take 1 capsule (20 mg total) by mouth daily before breakfast.     senna-docusate (PERICOLACE) 8.6-50 mg tablet Take 1-2 tablets by mouth daily as needed for constipation.         Equipment: Basic pill box- twice daily dosing     Pill Box was CCC RN     Medication Set Up: Dependent  Medication Administration:  Independent     Compliance: 80%            Future Appointments   Date Time Provider Department Center   9/10/2019  3:00 PM [facility]   10/7/2019 11:00 AM Montrell Zarate MD RLN [facility]         Action Plan  RN Will:  Next MEV on 9/10/19 at 3pm     Care Guide Will:  Care Guide Delegation        Nursing Notes:    Patient came back today to f/u on  elevated b/p with last visit and med set up/teaching.    Patient came in with the same med box that was set up by writer last week. Patient states he didn't take meds from the box as set up but took it from the bottle and looked at the med box as sample how to take it.     Writer also noticed that most pill bottles were empty. Patient there's someone come to his home checking B/P and meds but unable to tell writer who it was. States they will come back on Thursday and will ask them to call writer.     Patient was able to demonstrate how to take his meds and set up correctly today. Will check back with patient one more time on 10/9/19 for med compliance.     B/P today - 134/82, pulse -78  Goes to dialysis on M-W-F     Medrec and med reviewed with patient today.       1) Amlodipine 10mg (1) tabs daily- AM     2) Tums - hasn't  from pharmacy yet     3) Carvedilol 25mg two times a day       4) Lisinopril 20mg (1) tab daily     5) Omeprazole 20mg (1) cap daily - instructed to take 30 mins- 1 hr before breakfast. - reports lots of gas in stomach      6) Lisinopril -HCTZ - still on med list but was stopped last month by [facility] MD during ED visit     7) Senna S - 1 tab daily - AM

## 2021-06-01 NOTE — PROGRESS NOTES
Confirmed that patient has an [facility] worker now through [facility], Kiah, [phone] and she has been added to the Care Team.    Working on gathering bank statements for FRW so he can apply for SNAP/CASH benefits.    Addendum: Updated outreach date as CCC RN following up with patient on 11/5/19. W is working with pt on SNAP/CASH goal and plans to call patient.      Next Outreach: 11/22/19    Plan:     - Reminder next week Monday 11/25  appointment at 9am.

## 2021-06-02 VITALS — WEIGHT: 152.5 LBS | BODY MASS INDEX: 28.06 KG/M2 | HEIGHT: 62 IN

## 2021-06-02 VITALS — WEIGHT: 150.25 LBS | HEIGHT: 62 IN | BODY MASS INDEX: 27.65 KG/M2

## 2021-06-02 NOTE — PROGRESS NOTES
Clinic Care Coordination Contact    Follow Up Progress Note      Assessment: F/U ON HTN, HOME CARE STATUS, MED COMPLIANCE AND B/P    Spoke with patient via phone today.     Per chart review - home care started on 9/17/19.   1) HTN - B/P results the past few home care nursing visits:  130/80  150/80  140/74  142/72  Patient had 2 ED visits the past 60 days for elevated B/P. Was found out during CCC RN visit that patient was taking meds incorrectly. Since home care started much improvement noted with B/P.   Home care nurse setting up meds weekly.   3 more home care scheduled nursing visit.  will reassess for more nursing needs with last visit.     2) Met with home care SW on 9/24/19. See note below from SW.  Actions Performed     Current data - Visit as closed on 9/25/2019 by Es Colby BSW at 10:59 AM   May from [employer] is going to see pt today   No family to be agents.   Full code.   moved from [place] 3 yrs ago [facility] care here.   [employer] food shelf on [address] . Pt can get there today.   ~~Current data - Visit as closed on 9/25/2019 by Es Colby BSW at 10:59 AM   Writer encouraged pt to keep connected to the clinic careguide and clinic SW who will assist pt for long term with Counts include 234 beds at the [facility], mental health and legal issues.   Writer assisted pt in going through the HCD and the POLST. Pt noted he really does not have anyone he feels comfortable to be his agent.  Pt is clear he wants to be a full code and today wants all aggressive treatment available. Pt shared he was sent to [place] and chose to come to [facility] of the health care here. Pt has been in [place] for 3 years.   Writer spoke with dax Radford the Care Coordinator  Who shared the clinic care coordinate and SW have connoted pt to the Counts include 234 beds at the [facility] for financial assist/ SNAP, mental health pathways.   Writer was requested to help with the HCD and a food shelf. Pt has lminted family and friends support and decided on the POLST at this  time .   Writer did connect and call [employer] food shelf with pt and pt feels able to get to the food shelf today.   Pt is also meeting with May/ [facility] worker right after my visit today.   Pr does Dialysis M-W-F 12-5:30/[facility] on [address].   Pt is connected with the Caregiver and clinic SW and will continue to connect with them.   Pt denies any other needs at this time. Writer's name and number provided. ~~      Goals addressed this encounter:   Goals Addressed    None        goal updated today.           Plan:   1) CCC RN will do monthly outreach call on 11/5/19

## 2021-06-02 NOTE — TELEPHONE ENCOUNTER
Request for Orders    Who s Requesting: Home Care Registered Nurse    Orders being requested: Ongoing homecare orders for med set up assistance  1x a week for 4 weeks   SN visits medication mgmt/education, HTN, ESRD, GI/, safety   3 prn visits for med changes    Where to send Orders: Please respond to this Epic message.    Thank you, Rosenda PALMER [phone]

## 2021-06-02 NOTE — TELEPHONE ENCOUNTER
Orders as requested below APPROVED per provider per [facility] protocol. Routing to primary care physician as well as requester ([facility]

## 2021-06-02 NOTE — PROGRESS NOTES
ASSESMENT AND PLAN:  1. Disability examination Paperwork  - Pt needs letter stating Pt can not work for >45 days.  He is on Dialysis 3x/week and feels very weak to work.    -  Letter and form faxed to [place] [employer]. See Letters.     2. Screening for depression  -  PHQ9=4; Denies harm to self/others.   Orders:   - PHQ9 Depression Screen     Pt is present with professional .   Over 25 minutes total time spent with patient, with more than 50% in counseling and coordination of care on the above issues.   Reviewed Medical/Social history and Medications.   No new changes.  Discussed indications for emergent medical attention and routine F/u.  Patient/Parent/Guardian engaged in decision making process and verbalized understanding of the options discussed and agreed with the final treatment plan.     SUBJECTIVE:  Digna Gutierrez is a 33 y.o. male who presents  for evaluation of his Paperwork.    Denies fever/chills, wheezing, SOB, CP, n/v, abdominal pain, diarrhea/constipation, hematochezia, hematemesis.     ROS:  Comprehensive Review of Systems Negative except stated in HPI.     Past Medical History:   Diagnosis Date     Chronic kidney disease      ESRD (end stage renal disease) (H)      Solitary kidney      Patient Active Problem List   Diagnosis     ESRD (end stage renal disease) on dialysis (H)     GERD (gastroesophageal reflux disease)     Polypharmacy     Slow transit constipation     Language barrier affecting health care     Cough with hemoptysis     Epigastric pain     Essential hypertension     Hypertensive urgency     ESRD (end stage renal disease) (H)     LVH (left ventricular hypertrophy)     Current Outpatient Medications   Medication Sig Dispense Refill     amLODIPine (NORVASC) 10 MG tablet Take 1 tablet (10 mg total) by mouth daily. 90 tablet 3     carvedilol (COREG) 25 MG tablet Take 1 tablet (25 mg total) by mouth 2 (two) times a day. 180 tablet 3     lisinopril (PRINIVIL,ZESTRIL) 20 MG tablet Take  "1 tablet (20 mg total) by mouth daily. 90 tablet 3     omeprazole (PRILOSEC) 20 MG capsule Take 1 capsule (20 mg total) by mouth daily before breakfast. 90 capsule 3     senna-docusate (PERICOLACE) 8.6-50 mg tablet Take 1-2 tablets by mouth daily as needed for constipation. 60 tablet 11     calcium, as carbonate, (TUMS) 200 mg calcium (500 mg) chewable tablet Chew 2 tablets (400 mg total) 2 (two) times a day as needed for heartburn. 120 tablet 11     No current facility-administered medications for this visit.      Social History     Tobacco Use   Smoking Status Never Smoker   Smokeless Tobacco Never Used     OBJECTIVE: /84 (Patient Site: Right Arm)   Pulse (!) 59   Temp 98.8  F (37.1  C) (Oral)   Resp 20   Ht 5' 2\" (1.575 m)   Wt 142 lb (64.4 kg)   SpO2 97%   BMI 25.97 kg/m     No results found for this or any previous visit (from the past 24 hour(s)).    PHYSICAL:  General Alert, awake, not in acute distress.   CV Normal S1 & S2. No murmurs.   RESP Non-labored, RRR, CTAB. No wheezes or crackles.        Charlee Breen PA-C         "

## 2021-06-02 NOTE — PROGRESS NOTES
Programs Applying For: SNAP/CASH  County:  Case #:  [facility] Worker:   Ann-Marie #:   PMI #:   Tracking:   Date Applied:     Outreach:  2019: FRW called patient, patient stated he spoke with [facility] worker and due to not being a refugee he will not received SNAP/CASH. No further FRW needs, taking patient off panel.  2019: FRW called patient, patient received paperwork regarding SNAP/CASH. Patient doesn't know what paperwork states but will bring paperwork to [facility]. FRW will make outreach to patient if she does not receive paperwork.  CLOSED ENCOUNTER:  10/16/2019: FRW has not received verifications from patient yet. FRW called patient for a status on when the verifications will be ready. Patient stated he is unable to gather any information. FRW will send application as is and patient will get letter from financial worker at the [facility] with what verifications they need to process application. FRW faxed application to [facility] and will make outreach in 2 weeks on status update.   10/2/2019: FRW looked at CHW notes, patient is still gathering verifications for FRW. FRW will move outreach to another week or two   CLOSED ENCOUNTER:   2019: FRW met with patient at [facility] and applied for SNAP/CASH. Patient did not bring bank statements and will bring them to  at [place] with FRW business card. FRW has application and will fax to [facility] once the bank statements are received.  2019: FRW called patient on referral to screen and make appointment. Appointment made for  @ 10:00, outlook invitation sent to CHW.   2019: Referral Call: Attempt 1 Financial Resource Worker called and left a message for the patient. If the patient is returning my call, please transfer the patient to Adrienne Mahmood  at ext. [phone]      Health Insurance Information:   SNAP/CASH Application Screenin. Have you had county benefits before? Yes  2. How many people in the household, do you  eat/buy food together?  3. What is your monthly income (include all tax members)?   4. Do you have a bank account?   5. Do you have any utility bills (electricity, rent, mortgage, phone, insurance, medical bills, etc.)?   6. Do you have social security cards and/or green cards?     Referral:   Hi,     Patient is enrolled into [facility] and met with the SW yesterday. He reports he is no longer working and would to reapply for his Cash and SNAP benefits.     Thank you,     Malina

## 2021-06-02 NOTE — PROGRESS NOTES
Clinic Care Coordination Contact    Follow Up Progress Note:     Present for today's visit is pt,  Alondra, and HENRI LIGHT.    Pt is here today to follow up on 's license concerns. HENRI LIGHT has continued working with pt to resolve a previous speeding ticket. Today, pt signed his 's check in the amount of $202.50. This has been mailed directly to the following address:    [facility] [address]  [address] [place] [id number]     As previously documented, NADEGE has requested that proof of compliance and payment be sent directly to the clinic. At that time, NADEGE will fax the forms to the [place] [employer] and have a copy scanned into pt chart for record keeping purposes. Once the [place] [employer] receives them, there will be 21 days allowed for processing. Once processed, pt will be eligible for a 's license again.     Other pertinent updates include the following: Pt is not yet receiving SNAP/CASH - CHW to follow up with FRG regarding progress. Pt reports that he remains active on the transplant list. Pt is now established with Kiah through [facility] for [facility] - she is coming to meet with him biweekly.     Pt was rescheduled with CCC SW for 19 at 9:00am. Progress toward goals will continue.    Plan:   1.) See updated goals.  2.) Continue scheduled outreach.    Goals        Patient Stated      Financial Wellbeing (pt-stated)      I would like to meet with clinic FRW for assistance re-applying for SNAP benefits, CASH assistance, etc. within the next 30 days    Action steps to achieve this goal  1.  I will bring my bank statements into the clinic for the FRW so that she can submit my CASH/SNAP application to the [facility].    Date goal set: 19  Discussed/updated: 19; 10/14/19        I would like assistance resolving outstanding issues/fees associated with my  [place] drivers license (pt-stated)      Action steps to achieve  this goal  1.  I will meet with [facility] again on 11/25/19 to discuss resolution of speeding ticket received in 2017.   2.  I will wait for [facility] to update [facility] regarding resolution of speeding ticket received in 2017.  3.  I will pay any outstanding fees and provide any documentation or paperwork necessary to assist with this process. (Continuous)    NOTE: See SW documentation from 10/14/19.    Date goal set:  6/18/19  Discussed/updated: 7/2/19; 8/8/19; 8/29/19; 9/4/19; 10/14/19        I would like support and assistance applying for my [place] Citizenship within the next 6 months (pt-stated)      (GOAL ON HOLD): No further progress can be made on this goal until issue with pt's [place] drivers license is resolved.    Action steps to achieve this goal  1.  I will resolve issues with my [place] drivers license before scheduling an appointment with [facility] NADEGE to complete [facility] phone intake.  2.  Once issues with my [place] drivers license are resolved, I will contact [facility] to set up an appointment with [facility].  3.  In the meantime, will continue attending English/Citizenship classes. (Continuous)  4.  I will communicate with [facility] CG at outreach and express questions/concerns as they arise. (Continuous)      Date goal set: 5/14/19  Discussed/updated: 5/23/19; 5/24/19; 6/18/19        RN Goal: I would like CCC RN to coordinate care with [facility] re: kidney transplant in the next 90 days.  (pt-stated)      Action steps to achieve this goal  1.  I will speak with CCC RN at outreach calls  2. CCC  RN will reach out to [facility] kidney transplant team to find out if patient is on the list - CCC RN left a message for NADEGE Tripp with [facility] at [phone], pending.   3. I will call CCC RN with any concerns or questions.    Date goal set:  9/10/19

## 2021-06-03 VITALS
HEART RATE: 59 BPM | WEIGHT: 142 LBS | TEMPERATURE: 98.8 F | RESPIRATION RATE: 20 BRPM | HEIGHT: 62 IN | SYSTOLIC BLOOD PRESSURE: 138 MMHG | DIASTOLIC BLOOD PRESSURE: 84 MMHG | BODY MASS INDEX: 26.13 KG/M2 | OXYGEN SATURATION: 97 %

## 2021-06-03 VITALS — HEIGHT: 62 IN | BODY MASS INDEX: 26.02 KG/M2 | WEIGHT: 141.4 LBS

## 2021-06-03 VITALS — WEIGHT: 139.5 LBS | BODY MASS INDEX: 25.67 KG/M2 | HEIGHT: 62 IN

## 2021-06-03 NOTE — TELEPHONE ENCOUNTER
Rajesh is saying Omeprazole is no longer covered.  Does PCP wish a PA to be started or send alternate?  Thanks.

## 2021-06-03 NOTE — TELEPHONE ENCOUNTER
Please check with pharmacist to see if an alternative proton pump inhibitor is covered, if so, okay to substitute that medication with dosing per pharmacist.  If not, will need to start prior authorization.

## 2021-06-03 NOTE — TELEPHONE ENCOUNTER
New form completed indicating the patient cannot currently work because of his end-stage renal disease-dialysis dependent.

## 2021-06-03 NOTE — TELEPHONE ENCOUNTER
Central PA team  [phone]  Pool: HE PA MED ([id number])          PA has been initiated.       PA form completed and faxed insurance via Cover My Meds     Key:  [id number]     Medication:  Omeprazole 20MG dr capsules    Insurance[place]  [place]        Response will be received via fax and may take up to 5-10 business days depending on plan    `

## 2021-06-03 NOTE — TELEPHONE ENCOUNTER
Kiah [facility] worker from [employer] will come by [facility] tomorrow (Tuesday 11/26) to  the completed form as patient has no food and is about to have his county case closed. If he can get the Medical Opinion Form in [place] he can get access to his food support benefits right away.    Kiah states that she will be seeing Digna tomorrow and will bring him with her to the clinic to get the form and make sure it gets to the county as soon as possible.

## 2021-06-03 NOTE — TELEPHONE ENCOUNTER
It looks like that med is prescribed by the nephrologist so dose clarification needs to go to nephrology office.

## 2021-06-03 NOTE — TELEPHONE ENCOUNTER
[facility] is unable to find form for pt anywhere.  Was this already completed and faxed?  Thanks.

## 2021-06-03 NOTE — PROGRESS NOTES
Clinic Care Coordination Contact    Follow Up Progress Note      Assessment: f/u on HTN and home care status    Unable to reach patient via phone today x2    1) home care - started on 9/17/19 per chart review with [facility]- home care skilled nursing  manages medications and HTN weekly.     2) SNAP/cash assistance - current working with FRW to address this. Last reached out by FRW on 11/1/19    3) HTN - per chart review - home care visits. WNL the past few visits.   130/82   138/84  130/80  130/78   128/78        Plan:   1) CCC RN will continue to do monthly outreach call and chart review f/u on kidney transplant list  2) FRW will continue to assist with ERIKA/SNAP assistance

## 2021-06-03 NOTE — TELEPHONE ENCOUNTER
Please inform the patient that the omeprazole was denied by insurance so we will need to switch to famotidine 20 mg once daily-I sent in a new prescription for the new medication.

## 2021-06-03 NOTE — TELEPHONE ENCOUNTER
I need to clarify how much of the Lisinopril-HCTZ 20/25mg tablets patient is to be taking. Bottles in his home state to take 1 tablet daily and to take it after dialysis on dialysis days. In his chart it has orders for hime to take 2 tabs two times a day.     What should the patient be taking daily?    Please let me know as soon as possible.    Thank you - Yakelin Jean

## 2021-06-03 NOTE — PROGRESS NOTES
Interp: [id number]    Reminder provided about Monday 11/25 9am SW appointment at [facility].    CHW called [facility] to follow up on the transportation as patient has dialysis on the same day as the SW appointment and this can sometimes cause issues.     Per [facility] they report that no transportation was scheduled to [facility] on 11/25/19.    CHW proceeded to schedule medical transportation for the SW appointment on Monday 11/25/19 and requested [facility] transportation. Blue Ride Conf#: [phone]    Addendum: CCC RN outreached to [facility] on 12/27/19 and therefor CHW updating outreach date. CCC RN created new goal regarding his transplant concerns. See CCC RN notes for further details.      Next Outreach: 1/28/20    Plan:     - CHW will follow up on making sure insurance is active for the next 6 months per CCC RN new goal.

## 2021-06-03 NOTE — TELEPHONE ENCOUNTER
Did not see the form. He was seen by CHILANGO Breen on 10/28/2019 was filled out and faxed. Letter was in chart but the form from [facility] did not get scan into Media/pt's chart.I did check with pt, he said he sent out the letter. Will Dr. Zarate able to fill out the form again?

## 2021-06-03 NOTE — TELEPHONE ENCOUNTER
Name of form/paperwork: Other:  Medical opinion forms  Have you been seen for this request: Yes:  10/28/19  Do we have the form: Yes- patient dropped off forms  When is form needed by: as soon as possible    How would you like the form returned: Faxed  Fax Number: fax number on form  Patient Notified form requests are processed in 3-5 business days: Yes  (If patient needs form sooner, please note that in this message.)  Okay to leave a detailed message? Yes  [phone]

## 2021-06-03 NOTE — TELEPHONE ENCOUNTER
Request for Orders and update about med change    Who s Requesting: Home Care Registered Nurse    Orders being requested: ongoing homecare orders for medication management.  1x a week for 9 weeks.  3 prn for medication changes    FYI:  Also, the MD at the dialysis clinic, Dr. Bridget Forte, changed his    lisinopril to lisinopril/HCTZ 20/25 two times a day.      Where to send Orders: Please respond to th is message.    Thank you, Rosenda RN [facility] [phone]

## 2021-06-03 NOTE — TELEPHONE ENCOUNTER
Writer contacted the pharmacy to ask if Famotidine. BC/BS has quantity limit. We will initiate PA per MD note below.

## 2021-06-03 NOTE — TELEPHONE ENCOUNTER
I think it is still [facility] nephrology clinic but we have not been getting records.

## 2021-06-03 NOTE — PROGRESS NOTES
Clinic Care Coordination Contact     Pt was a no show for initial [facility] SW visit x1. Offer to reschedule at next outreach if needed.    NOTE/UPDATE: Proof of compliance and payment was received from [facility]. These documents were faxed to the [place] [employer] on 11/15/19 and require 21 days for processing.     Goals Addressed                 This Visit's Progress       Patient Stated      I would like assistance resolving outstanding issues/fees associated with my  [place] drivers license (pt-stated)        Action steps to achieve this goal  1.  I will wait for [place] [employer] to update [facility] SW and/or myself regarding resolution of speeding ticket received in 2017.    NOTE: See SW documentation from 19.    Date goal set:  19  Discussed/updated: 19; 19; 19; 19; 10/14/19; 19

## 2021-06-03 NOTE — TELEPHONE ENCOUNTER
CMT reviewed Media, Referrals, and office notes. Care Teams reviewed. Who is the patient seeing or which nephrology office is following?    cont meds  monitor bp

## 2021-06-03 NOTE — TELEPHONE ENCOUNTER
Orders as requested below APPROVED per provider per [facility] protocol. Routing to primary care physician as well as requester ([facility]   CMT will route to PCP for review on BP medication change. Thanks.

## 2021-06-04 VITALS
SYSTOLIC BLOOD PRESSURE: 112 MMHG | RESPIRATION RATE: 16 BRPM | HEIGHT: 62 IN | OXYGEN SATURATION: 97 % | DIASTOLIC BLOOD PRESSURE: 70 MMHG | BODY MASS INDEX: 25.58 KG/M2 | WEIGHT: 139 LBS | HEART RATE: 73 BPM | TEMPERATURE: 98.8 F

## 2021-06-04 VITALS — HEIGHT: 62 IN | BODY MASS INDEX: 24.5 KG/M2 | WEIGHT: 133.13 LBS

## 2021-06-04 NOTE — PROGRESS NOTES
Clinic Care Coordination Contact    Follow Up Progress Note:     Present for today's visit is pt, pt's [facility] worker Kiah,  Alondra, and CCC SW.    No update regarding resolution of pt's [place] speeding ticket. Proof of compliance and payment (from [facility]) was faxed to the [place] [employer] on 11/15/19. Pt's ARM worker, Kiah, intends to take him to the DMV following today's appointment to verify his 's license status now that the ticket has been paid.    [facility] worker and pt share that they went to the [facility] this past Saturday (19) to complete and file initial citizenship application. They were able to do this successfully. [facility] worker is requesting copies of SW's previous visit notes which detail attempts by both pt and SW to resolve aforementioned speeding ticket. Pt agreed to releasing these notes today and will go with the ARM worker to drop these supporting documents off at the [facility] today. ARM worker states that she will follow up on the citizenship process with this pt moving forward.    Pt received a letter in the mail dated 19 from [facility] which states that he is NOT currently on the transplant list. One of the reasons listed is medication noncompliance. Pt following up with CCC RN on 19 to confirm adequate medication management now that home health RN is involved. Will have a copy of this letter scanned into pt chart for RN's review.     Plan:   1.) See updated goals.   2.) Continue scheduled outreach.    Goals Addressed                 This Visit's Progress       Patient Stated      I would like assistance resolving outstanding issues/fees associated with my  [place] drivers license (pt-stated)        Action steps to achieve this goal  1.  I will go with my [facility] worker, Kiah, to the DMV to verify my 's license status on 19.    NOTE: ARM  worker will update CHW and/or SW following visit to the DMV.    Date goal set:  6/18/19  Discussed/updated: 7/2/19; 8/8/19; 8/29/19; 9/4/19; 10/14/19; 11/22/19; 12/16/19        COMPLETED: I would like support and assistance applying for my [place] Citizenship within the next 6 months (pt-stated)        Action steps to achieve this goal  1.  I will continue attending English/Citizenship classes.   2.  I will continue working with [facility] worker, Kiah, who is assisting me with the process of becoming a [place] Citizen.    Date goal set: 5/14/19  Discussed/updated: 5/23/19; 5/24/19; 6/18/19; GOAL ON HOLD    Completed: 12/16/19        RN Goal: I would like CCC RN to coordinate care with [facility] re: kidney transplant in the next 6 months.  (pt-stated)        Action steps to achieve this goal  1.  I will speak with CCC RN at outreach calls.  2.  CCC  RN will reach out to [facility] kidney transplant team to find out if patient is on the list - CCC RN left a message for NADEGE Tripp with [facility] at [phone], pending.     Date goal set:  9/10/19  Discussed/updated: 11/5/19

## 2021-06-05 VITALS
TEMPERATURE: 99.5 F | HEART RATE: 85 BPM | SYSTOLIC BLOOD PRESSURE: 98 MMHG | BODY MASS INDEX: 25.65 KG/M2 | OXYGEN SATURATION: 98 % | RESPIRATION RATE: 18 BRPM | WEIGHT: 140.25 LBS | DIASTOLIC BLOOD PRESSURE: 68 MMHG

## 2021-06-05 NOTE — PROGRESS NOTES
Insurance is active as of 2/3/2020 with no end date listed [place].    No other concerns.      Next Outreach: 3/3/2020    Plan:     - Check [place], is insurance active?

## 2021-06-05 NOTE — PROGRESS NOTES
PSYCHOTHERAPY DISCHARGE SUMMARY      Patient Name Digna ERNST 81    Dates of service    3/25/2019, 2019, 2019, DA completed) 2019 and 2019.                                                  Number of Sessions 5     Reason for referral/ Presenting issues:  Patient is a 33 yr old male who was referred by Charlee Breen PA-C. Patient reporting symptoms of depression and anxiety as he is experiencing a number of medical, financial and emotional issues.     Diagnosis at Intake:    Major Depressive Disorder with Current active Episode   Generalized Anxiety Disorder      Additional comments and scores   Functional Impairments:        Personal: 3  Family: 0  Work: 0  Social:3     WHODAS WHODAS 2.0 12-item version:  51.0%     Scores presented in qualifiers to represent level of disability.  SEVERE Problem (high, extreme, ...) 50-95%     H1= 25  H2= 20  H3= 20     CAGEscoring (0/4)   PHQ-9, scoring 18 indicating severe depression  ANGEL-7  Scoring 21 indicating severe anxiety  C-SSRS. Although, patient had exhibited a suicidal jesture when living in a [place] back in [facility] over 10 years ago, he stated that he does not have any current suicidal thoughts or ideations.        Diagnosis at Discharge:   Major Depressive Disorder with Current active Episode   Generalized Anxiety Disorder     Reason for discharge:   Was improving      Prognosis at discharge:  Moderate           Recommendations and referrals at discharge  1.  Return to therapy if symptoms recur.  2.  Letter will be sent to patient regarding writer's departure and care options.     Provider Signature: Judith Fernandes LICSW       Date:  20

## 2021-06-05 NOTE — TELEPHONE ENCOUNTER
Requesting orders for recertification of skilled nursing every 2 weeks for 9 weeks for medication management. 3 PRN visits for medication changes, reassessments, and OASIS data collection.        FYI med changes from dialysis:  12/16 carvedilol decreased to 12.5mg BID  1/8/2020 lisinopril discontinued  12/20/19 lisinopril-hctz discontined  Dr. Johnson

## 2021-06-06 NOTE — PROGRESS NOTES
Clinic Care Coordination Contact    Follow Up Progress Note        Goals addressed this encounter:     Goals Addressed                 This Visit's Progress       Patient Stated      Health Insurance (pt-stated)        I will keep my health insurance active the next 6 months so that I can be placed on the kidney transplant waiting list.    Action steps to achieve this goal  1. My insurance is active at this time with no end date as of 3/2/2020.  2. I will ask my [facility] worker for help with any [facility] or [facility] get in [facility] mail.    Date goal set:  12/27/19    Updated: 1/27/20; 2/3/20; 3/2/20                 Intervention/Education provided during outreach:     CHW reviewed [place] and insurance is active with no end date listed in the chart. Stantonlenny Matt reports no new concerns.          Plan:      - Digna will continue to attend his appointments.      Care Coordinator will follow up on 4/2/2020

## 2021-06-06 NOTE — TELEPHONE ENCOUNTER
PRN visit was set up today to add famotidine to med box, when I arrived in patients home no famotidine was delivered. I called [facility] and was told that they are currently out of 20mg and 40mg stock supply and they do not know when they will get any in. I just wanted to let you know.    Thank you,  Yakelin Jean

## 2021-06-07 NOTE — PROGRESS NOTES
Clinic Care Coordination Contact    Follow Up Progress Note      Assessment: f/u pain mgmt post surgery  Chart review completed today.   Unable to reach patient via phone.     Patient has home care service for med mgmt every 2 weeks. Last visit by SNV on 4/9/2020.   Per chart review, per home care, pain rating at 9/10 to left arm dialysis fistula. Patient was instructed to use tylenol for pain mgmt by SNV.     Writer left a vm message on patient's cell to kelsey back if pain still at 9/10 to dialysis fistula site or to call PCP's office. Dr Zarate if out of the office this whole week and will be back on 4/20/2020.       Goals addressed this encounter:   n/a          Plan:   1) CCC RN will f/u on 4/20/2020 - consult with PCP if pain rating >8/10

## 2021-06-07 NOTE — PROGRESS NOTES
Clinic Care Coordination Contact    Follow Up Progress Note        Goals addressed this encounter:     Goals Addressed                 This Visit's Progress       Patient Stated      Health Insurance (pt-stated)        I will keep my health insurance active the next 6 months so that I can be placed on the kidney transplant waiting list.    Action steps to achieve this goal  1. My insurance is active at this time with no end date as of 4/1/2020.  2. I will ask my [facility] worker for help with any county or [facility] paperwork I get in the mail.    Date goal set:  12/27/19    Updated: 1/27/20; 2/3/20; 3/2/20;4/1/20                 Intervention/Education provided during outreach:     CHW reviewed [place] and confirmed insurance is active.    CCC RN outreached to [facility] yesterday and therefor the CHW is not outreaching to [facility] today.          Plan:      - CHW will review [place] in May.      Care Coordinator will follow up on 5/1/2020

## 2021-06-07 NOTE — PROGRESS NOTES
Clinic Care Coordination Contact    Community Health Worker Follow Up    Goals:     Goals Addressed                 This Visit's Progress       Patient Stated      Health Insurance (pt-stated)   On track     I will keep my health insurance active the next 6 months so that I can be placed on the kidney transplant waiting list.    Action steps to achieve this goal  1. My insurance is active at this time with no end date as of 5/1/2020.  2. I will ask my [facility] worker for help with any county or [facility] paperwork I get in the mail.    Date goal set:  12/27/19    Updated: 1/27/20; 2/3/20; 3/2/20; 4/1/20; 5/1/20              CHW Next Follow-up: 6/1/2020    CHW Plan: CHW will review [place] in June    No new concerns. Insurance is still active.    CCC RN following up with Digna on Wednesday 6/3/2020.

## 2021-06-07 NOTE — ANESTHESIA CARE TRANSFER NOTE
Last vitals:   Vitals:    03/26/20 1543   BP: 114/55   Pulse: (!) 58   Resp: 16   Temp: 36  C (96.8  F)   SpO2: 97%     Patient's level of consciousness is awake  Spontaneous respirations: yes  Maintains airway independently: yes  Dentition unchanged: yes  Oropharynx: oropharynx clear of all foreign objects    QCDR Measures:  ASA# 20 - Surgical Safety Checklist: WHO surgical safety checklist completed prior to induction    PQRS# 430 - Adult PONV Prevention: 4558F - Pt received => 2 anti-emetic agents (different classes) preop & intraop  ASA# 8 - Peds PONV Prevention: NA - Not pediatric patient, not GA or 2 or more risk factors NOT present  PQRS# 424 - Kasie-op Temp Management: 4559F - At least one body temp DOCUMENTED => 35.5C or 95.9F within required timeframe  PQRS# 426 - PACU Transfer Protocol: - Transfer of care checklist used  ASA# 14 - Acute Post-op Pain: ASA14B - Patient did NOT experience pain >= 7 out of 10

## 2021-06-07 NOTE — ANESTHESIA POSTPROCEDURE EVALUATION
Patient: Digna Gutierrez  Procedure(s):  LEFT ARM TRANSPOSED CEPHALIC VEIN ACCESS AND LIGATION PSEUDOANEURYSM FOREARM (Left)  Anesthesia type: regional    Patient location: Phase II Recovery  Last vitals:   Vitals Value Taken Time   /57 3/26/2020  4:30 PM   Temp 36  C (96.8  F) 3/26/2020  3:43 PM   Pulse 60 3/26/2020  4:33 PM   Resp 16 3/26/2020  4:30 PM   SpO2 98 % 3/26/2020  4:33 PM   Vitals shown include unvalidated device data.  Post vital signs: stable  Level of consciousness: awake and responds to simple questions  Post-anesthesia pain: pain controlled  Post-anesthesia nausea and vomiting: no  Pulmonary: unassisted  Cardiovascular: stable  Hydration: adequate  Anesthetic events: no    QCDR Measures:  ASA# 11 - Kasie-op Cardiac Arrest: ASA11B - Patient did NOT experience unanticipated cardiac arrest  ASA# 12 - Kasie-op Mortality Rate: ASA12B - Patient did NOT die  ASA# 13 - PACU Re-Intubation Rate: NA - No ETT / LMA used for case  ASA# 10 - Composite Anes Safety: ASA10A - No serious adverse event    Additional Notes:

## 2021-06-07 NOTE — ANESTHESIA PROCEDURE NOTES
Peripheral Block    Patient location during procedure: pre-op  Start time: 3/26/2020 12:45 PM  End time: 3/26/2020 12:46 PM  surgical anesthesia  Staffing:  Performing  Anesthesiologist: Toyin Membreno MD  Preanesthetic Checklist  Completed: patient identified, site marked, risks, benefits, and alternatives discussed, timeout performed, consent obtained, airway assessed, oxygen available, suction available, emergency drugs available and hand hygiene performed  Peripheral Block  Block type: other, intercostobrachial and medial brachial  Prep: ChloraPrep  Patient position: supine  Patient monitoring: blood pressure, heart rate, continuous pulse oximetry and cardiac monitor  Laterality: left              Needle  Needle type: Stimuplex   Needle gauge: 20G  Needle length: 4 in  no peripheral nerve catheter placed  Assessment  Injection assessment: no difficulty with injection

## 2021-06-07 NOTE — TELEPHONE ENCOUNTER
Tuesday 4/7 at [address] [place] [address] [facility]   [address], [address]  [place], [address]  [phone]   Dr. Deshawn Vincent [facility] Conf#: [id number]  Good [place]

## 2021-06-07 NOTE — PROGRESS NOTES
Clinic Care Coordination Contact    Follow Up Progress Note      Assessment: f/u post surgery -LEFT ARM TRANSPOSED CEPHALIC VEIN ACCESS AND LIGATION PSEUDOANEURYSM FOREARM - AV fistula left arm    Chart review completed today.   Patient states he's still pain post surgery ( 3/26/2020) rating at 5/10 but it's gradually getting better. He's out of pain meds that was prescribed for him post surgery. States he will talk to a nurse to dialysis tomorrow.   States not sure if he needs f/u appt with the surgeon or not. States no one told him anything. States to go ahead and schedule f/u appt with the surgeon and to let him know date and time. Prefers non-dialysis days. Goes to dialysis on M-W-F    States he would like to get more pain meds if he can before he could see the surgeon again. Message sent to Dr. Gonzalez today.     Called and spoke with Deshawn Grider - surgeon's office staff today [phone].     Scheduled f/u appt with Deshawn Grider - surgeon on 4/7/2020 @ 1pm.   Unable to reach patient via phone post appt schedule. Will ask CHW to notify patient and assist with transportation if needed.         Goals addressed this encounter:   Goals        Patient Stated      Health Insurance (pt-stated)      I will keep my health insurance active the next 6 months so that I can be placed on the kidney transplant waiting list.    Action steps to achieve this goal  1. My insurance is active at this time with no end date as of 3/2/2020.  2. I will ask my [facility] worker for help with any [facility] or [facility] paperwork I get in the mail.    Date goal set:  12/27/19    Updated: 1/27/20; 2/3/20; 3/2/20        RN Goal: I would like CCC RN to coordinate care with [facility] re: kidney transplant in the next 6 months.  (pt-stated)      Action steps to achieve this goal  1.  I will speak with CCC RN at outreach calls.  2.  CCC  RN will reach out to [facility] kidney transplant team to find out if patient is on the list - CCC RN left a message  for NADEGE Tripp with [facility] at [phone], pending.     Goal on hold until March, 20 due to inactive list with transplant list   Date goal set:  9/10/19  Discussed/updated: 11/5/19; 12/27/19; 3/31/2020                    Plan:   1) CHW to notify patient of his f/u appt with the surgeon on 4/7/2020 @ 1pm. Deshawn Grider - please assist with transportation if needed. See note above.     2) CCC RN will f/u in 4/13/2020    3) Message sent to Deshawn Grider per patient request for more pain medication.

## 2021-06-07 NOTE — TELEPHONE ENCOUNTER
This message is to inform you that your patient may have been exposed to someone with Covid-19 when receiving services. The patient has been notified via phone.

## 2021-06-07 NOTE — TELEPHONE ENCOUNTER
CHW spoke with [facility] and they report that due to COVID Digna is receiving services at their [place] location on [facility]. The SW at [facility] reports that they set up a standing transportation order for Digna to attend dialysis and will look into who it did not show up today Monday 4/20.

## 2021-06-07 NOTE — PROGRESS NOTES
Clinic Care Coordination Contact    Follow Up Progress Note      Assessment: f/u on pain mgmt to fistula site.   Chart review completed today.   Spoke with patient via phone.     Pain mgmt: Patient states he has no more pain to fistula site except itching.     Patient states his ride hasn't showed up today yet to take him to dialysis. Writer reached out to CHW via skype to assist patient right away.     Goals addressed this encounter:   Goals        Patient Stated      Health Insurance (pt-stated)      I will keep my health insurance active the next 6 months so that I can be placed on the kidney transplant waiting list.    Action steps to achieve this goal  1. My insurance is active at this time with no end date as of 4/1/2020.  2. I will ask my [facility] worker for help with any [facility] or [facility] paperwork I get in the mail.    Date goal set:  12/27/19    Updated: 1/27/20; 2/3/20; 3/2/20;4/1/20        RN Goal: I would like CCC RN to coordinate care with [facility] re: kidney transplant in the next 6 months.  (pt-stated)      Action steps to achieve this goal  1.  I will speak with CCC RN at outreach calls.  2.  CCC  RN will reach out to [facility] kidney transplant team to find out if patient is on the list - CCC RN left a message for NADEGE Tripp with [facility] at [phone], pending. ( On hold due to covid-19)     Goal on hold until June, 20 due to inactive list with transplant list   Date goal set:  9/10/19  Discussed/updated: 11/5/19; 12/27/19; 3/31/2020; 4/20/2020                    Plan:   1) CCC RN will f/u on 6/3/2020

## 2021-06-07 NOTE — ANESTHESIA PREPROCEDURE EVALUATION
Anesthesia Evaluation      Patient summary reviewed   No history of anesthetic complications     Airway   Mallampati: II  Neck ROM: full   Pulmonary - negative ROS and normal exam                          Cardiovascular - normal exam  (+) hypertension, ,     ECG reviewed        Neuro/Psych - negative ROS     Endo/Other - negative ROS      GI/Hepatic/Renal    (+) GERD,   chronic renal disease,           Dental - normal exam                        Anesthesia Plan  Planned anesthetic: peripheral nerve block and MAC    ASA 3   Induction: intravenous   Anesthetic plan and risks discussed with: patient    Post-op plan: routine recovery

## 2021-06-08 NOTE — TELEPHONE ENCOUNTER
Hello,  Patient was seen today for a late re-certification.  Re-certification was 1 day late as patient was not available yesterday when it was originally scheduled.  Can we please have orders for late re-cert and then SNV once every 2 weeks for 10 weeks and 3 PRN's for medication management and set up.  Thank you  Page Cortez RN

## 2021-06-08 NOTE — TELEPHONE ENCOUNTER
Verbal orders given for late recertification and skilled nursing visits requested as stated below.    Nelia Adames MD

## 2021-06-08 NOTE — PROGRESS NOTES
Clinic Care Coordination Contact    Community Health Worker Follow Up    Goals:     Goals Addressed                 This Visit's Progress       Patient Stated      Health Insurance (pt-stated)   On track     I will keep my health insurance active the next 6 months so that I can be placed on the kidney transplant waiting list.    Action steps to achieve this goal  1. My insurance is active at this time with no end date as of 6/1/2020.  2. I will follow up with the CCC RN in July regarding my consistent insurance status being effective.     Date goal set:  12/27/19    Updated: 1/27/20; 2/3/20; 3/2/20; 4/1/20; 5/1/20; 6/1/20              CHW Next Follow-up: 7/1/2020    CHW Plan: Continue to outreach as scheduled and [place].    Digna Gutierrez is aware his insurance is still active which is a requirement in order to be placed on the transplant list.    Digna understands that the CHW will check again in July and the CCC RN will follow up with him in July as well.

## 2021-06-09 NOTE — PROGRESS NOTES
Clinic Care Coordination Contact    Community Health Worker Follow Up    Goals:        Goals Addressed                 This Visit's Progress       Patient Stated      COMPLETED: Health Insurance (pt-stated)   On track     I will keep my health insurance active the next 6 months so that I can be placed on the kidney transplant waiting list.    Personal Plan:  1. My insurance has been consistently active for 6 months and I will continue to monitor my incoming mail for any changes to my insurance or for renewal paperwork.    Date goal set:  12/27/19    Updated: 1/27/20; 2/3/20; 3/2/20; 4/1/20; 5/1/20; 6/1/20; 7/1/2020              CHW Next Follow-up: TBD    CHW Plan: Routing to CCC RN as patient has no new CHW goals at this time    Digna has active health insurance this month with no [place] and has completed his goal of having insurance for 6 months.    CCC RN follow up is Monday 7/6 and he will need an annual CCC Re-Assessment completed at that time as he is overdue.

## 2021-06-09 NOTE — TELEPHONE ENCOUNTER
1. Requesting order for recertification of skilled nursing every 2 weeks for 9 weeks for medication management and general health assessment. 3 PRN visits for medication changes, reassessments, and OASIS data collection.    2. Patient was evaluated last year for transplant and from the note it appears med compliance was a barrier. Patient is 100% compliant with meds as set up by nursing and highly motivated to get a transplant. He is hoping to restore his health and get back to working. Can you assist him to get a new evaluation from the transplant clinic?    Thanks

## 2021-06-09 NOTE — TELEPHONE ENCOUNTER
Order for RN visits approved.     I will defer to Dr. Zarate regarding the transplant evaluation.     Dr. Liane Sierra  7/9/2020

## 2021-06-09 NOTE — TELEPHONE ENCOUNTER
Thank you for the update.  Malina is his care coordination care guide and I am hopeful that she can connect with the transplant clinic and pass on this information to them and help coordinate a follow-up with the transplant clinic.  Let me know if there is anything I need to do.  Thanks.

## 2021-06-09 NOTE — PROGRESS NOTES
Clinic Care Coordination Contact    This message was sent securely using [employer]       Mr. Gutierrez is scheduled for a screening visit on .  I will meet with him at that time and assess his living situation and support system.  Of course the other disciplines will meet with him as well and then recommendations will be made.    From: Lyudmila Glover ([facility]) <[email or web address]>   Sent:  2:02 PM  To: Kiah Jalloh <[email or web address]>  Subject: [EXTERNAL] transplant list    CAUTION: This email originated from outside of [facility]. DO NOT CLICK links or open attachments unless you recognize the sender and know the content is safe.    Gopal Dupont,  Patient would like to get back on active transplant list. He was removed from transplant list at the end of last year and I am wondering how he can get it back on since he has been compliance with taking his meds the past 6 months. He has home are nurse setting up his meds.   Please advise.     Patient: Digna Gutierrez ( : 1985)     ThanksLyudmila

## 2021-06-10 NOTE — TELEPHONE ENCOUNTER
Okay to double book tomorrow morning if he can.  Otherwise, I am off next week so he could see 1 of my partners if preferred next week or I could see him on the Tuesday of the following week-okay to double book.

## 2021-06-10 NOTE — TELEPHONE ENCOUNTER
Please assist patient schedule appt with Dr Zarate for depression. Patient reports suicidal ideations off and on.   Prefer appt with Dr Zarate only as soon as possible.     Prefer appt on non-dialysis days ( Tues or Thurs only)    Thanks,   Lyudmila Glover RN

## 2021-06-10 NOTE — PROGRESS NOTES
Clinic Care Coordination Contact    Follow Up Progress Note      Assessment: Return call back Margaret Mirza RN - Transplant coordinator with [facility]    Writer received a call back from Mirian Mirza RN - transplant coordinator with [facility] today.     Per Margaret, transplant team has several concerns regarding patient below:    1) Depression - patient will need to establish care with a psychiatrist for his depression - Patient has appt scheduled with Dr Zarate on 9/1/2020. Patient reports of suicidal thoughts.     2[facility] Patient needs to re-establish care with [facility] worker to assist him with care partner.     3) Patient needs a care partner or identify someone who could assist him post surgery with med mgmt, ADLs, f/u appts etc in home.     4) Per Margaret, to call backin 3-6 months for follow up sooner after met all goals.     Goals addressed this encounter:   Goals Addressed                 This Visit's Progress       Patient Stated      Other (pt-stated)   100%     Goal Statement: CCC RN will do outreach calls monthly to coordinate my cares with transplant team so I can get on active kidney transplant list the next 90 days.   Date Goal set: 8/19/2020  Barriers: language barrier, lack of knowledge  Strengths: motivate to learn  Date to Achieve By: 11/19/2020  Patient expressed understanding of goal: Yes    Action steps to achieve this goal:  1. I will speak with CCC RN at outreach calls.   2. I will attend all my appts                     Plan:   1) CCC RN will f/u on 9/1/2020

## 2021-06-10 NOTE — PROGRESS NOTES
CHW contacted [facility] and they confirmed that Digna has transportation set up for his Thursday 8/13 12 noon appointment at [facility], it seems his dialysis SW or his [facility] worker must be assisting with transportation at this time.    CHW Outreach: TBD (No CHW goals at this time)    CHW Plan: Routing to CCC RN to let her know transportation was confirmed to appointment.

## 2021-06-10 NOTE — PROGRESS NOTES
"  CHW received a call back from NADEGE Tripp who reports that she did not get emails from W.    He was scheduled on the 7th with [facility], but then Digna reported to his dialysis SW that he wanted to reschedule the appointment to a day when he does not have dialysis. Re-scheduled to Thursday the 13th at 12 noon arrival at [facility] for a \"screening visit\" and then if it \"makes sense\" he will have a follow up with all the providers on the transplant team.    [facility] will call to remind patient of appt that is on the 13th.    First and foremost if Digna is allowed onto the transplant list he could remain on that list for more than year or two. Secondly, Kiah understands that if he is approved for a transplant they will need to contact [facility] to let us know so that we can re-enroll him into [facility] for extra support as he will likely be graduated before he gets a transplant.    Kiah reports that their main concerns are Digna's need for a \"Care Partner\" or someone who can help him manage his daily needs as he will have a lot of appointments following a transplant procedure. She estimates appointments will be 2-3x per week post transplant for 6-8 weeks then it gets less and less. She states there will be lots of medication changes post-transplant as well so they will need to stay in contact with [facility] and he will need 1x per week  appointments. Kiah states he will also need to make sure he has transportation to all of his follow up [facility] appointments post-transplant and [facility] DOES NOT HELP with transportation. CHW did inform Kiah that Digna has an [facility] worker they can coordinate with regarding transportation as well has the [facility] . Digna previously had housing issues in the past and Kiah states this concern will need to be resolved before he can be placed on the transplant list.             "

## 2021-06-10 NOTE — PROGRESS NOTES
Clinic Care Coordination Contact    Follow Up Progress Note      Assessment: f/u on med compliance, active kidney transplant list.   Chart review completed today.   Spoke with patient via phone.   Reviewed NADEGE Tripp note dated on 8/13/2020. Reviewed note with patient.     1) Kidney transplant list   Per NADEGE Tripp - patient will need to re-establish care with [facility] worker. CHW placed a referral to [facility] today.    - Patient will need to identify care partner - hoping [facility] worker to assist him.   - Message sent home care  today to reach out to NADEGE Tripp about med compliance    2) Per NADEGE Tripp note dated on 8/13/2020, patient reports suicidal ideations. Patient confirmed today that he has thoughts ending his life due to his health issues off and on the past few months but no intention to act on it yet.   Denies suicidal ideation today or the past few days.   - CCC RN sent message to PCP and [facility] scheduling pool to assist patient to schedule appt with PCP ASAP for depression.   Patient is not on any depression meds.   Patient agrees to talk to Dr Zarate to address his depression.   Requested home care nurse  to monitor suicidal ideations in home and increase SNV to weekly.     3) CCC team had case review on patient's case today.     Goals addressed this encounter:   Goals Addressed                 This Visit's Progress       Patient Stated      Other (pt-stated)        Goal Statement: I would like to re-establish care with [facility] worker and in home therapist the next 90 days.   Date Goal set: 8/19/2020  Barriers: language barrier, lack of knowledge, lack of support  Strengths: motivate to learn, open to restart [facility] service   Date to Achieve By: 11/19/2020  Patient expressed understanding of goal: Yes    Action steps to achieve this goal:  1. I will answer my phone in  a timely manner.   2. I will call CHW with any concerns or questions.            Other (pt-stated)        Goal Statement: CCC RN will do outreach calls monthly to coordinate my cares with transplant team so I can get on active kidney transplant list the next 90 days.   Date Goal set: 8/19/2020  Barriers: language barrier, lack of knowledge  Strengths: motivate to learn  Date to Achieve By: 11/19/2020  Patient expressed understanding of goal: Yes    Action steps to achieve this goal:  1. I will speak with CCC RN at outreach calls.   2. I will attend all my appts                          Plan:   1) Patient will attend his appt with PCP on 9/1/2020  2) CHW reached out to [facility] today to request [facility] worker and in home therapist if patient agrees.   3) Message sent to home care  today to monitor depression, increase SNV to weekly, and home care nurse to reach out to NADEGE Degroot regarding meds non-compliance concerns.   4) CCC RN will f/u on 9/1/2020

## 2021-06-11 NOTE — TELEPHONE ENCOUNTER
----- Message from Montrell Zarate MD sent at 9/8/2020  1:00 PM CDT -----  Please call the patient to review his lab results and please check with him to see if he got the sertraline medication and is taking it every day?  Let me know.  Thank you.

## 2021-06-11 NOTE — PROGRESS NOTES
ASSESMENT AND PLAN:  Diagnoses and all orders for this visit:    Current severe episode of major depressive disorder without psychotic features without prior episode (H)  Extensive counseling today with the patient with the help of a professional Marcie telephone .  The patient is initially reluctant to take antidepressant medication.  However, after further discussion he agrees to start low-dose sertraline as prescribed below.  Will continue to get close follow-up from our care management care coordination team, he has been referred to [facility], he declines formal psychotherapy at this point.  I do worry about his social isolation, will see what our care coordination team can do to help increase his community connections.  Close follow-up with me in the clinic in 3 weeks, sooner if worsening or problems.  Patient does contract for safety today and agrees to present immediately to the emergency room if he develops suicidal intention or planning.  -     Thyroid Cascade  -     sertraline (ZOLOFT) 50 MG tablet; Take 1 tablet (50 mg total) by mouth daily.  Dispense: 30 tablet; Refill: 6    ESRD (end stage renal disease) on dialysis (H)  -     LDL Cholesterol, Direct  Patient is in the process of getting relisted on the transplant list.  Counseling done with the patient on the importance of good management of his other medical conditions, including depression, in order to maximize his chances on the transplant list.  He will continue to work with the transplant clinic and with our care coordination team.    Essential hypertension  Well-controlled.  Continue current plan.    Immunization due  Immunization review and counseling done with the patient.  -     Influenza, Seasonal Quad, PF =/> 6months        Reviewed the risks and benefits of the treatment plan with the patient and/or caregiver and we discussed indications for routine and emergent follow-up.    Over 40 minutes of total face-to-face time, more than  half in counseling and coordination of care on the above.     SUBJECTIVE: 34-year-old male who has 3 times per week dialysis for his end-stage kidney disease, have not seen him in quite a while in the clinic.  I had been notified by our care coordination nurse Lyudmila that the patient has been suffering from worsening depression and had expressed some suicidal thinking.  Patient has had some thoughts over these last couple of months about killing himself but has not had any planning or intent to do so.  He is frustrated by his chronic kidney disease, his isolation.  He lives alone, he came to this country alone and does not have any family members that he is in regular contact with.  He is a member of her [employer] community but that has been significantly limited with the impact of COVID-19.  He does not have close friends that he is in contact with regularly.    Patient is a transplant clinic patient and had been delisted because of concerns about medication adherence.  Since then, he has been much more adherent to his medication, he gets assistance from a home nurse with medication set up, and has also been getting assistance from our care coordination team.  Patient reports he is taking his medications regularly and denies any side effects or problems from them but he is reluctant to consider additional medication because he feels like he already takes a lot of medication.    Past Medical History:   Diagnosis Date     Chronic kidney disease      ESRD (end stage renal disease) (H)      Solitary kidney      Patient Active Problem List   Diagnosis     ESRD (end stage renal disease) on dialysis (H)     GERD (gastroesophageal reflux disease)     Polypharmacy     Slow transit constipation     Language barrier affecting health care     Cough with hemoptysis     Epigastric pain     Essential hypertension     Hypertensive urgency     ESRD (end stage renal disease) (H)     LVH (left ventricular hypertrophy)     Current  "Outpatient Medications   Medication Sig Dispense Refill     calcium acetate 667 mg Tab Take 2 tablets by mouth 3 (three) times a day. Indications: low amount of calcium in the blood       calcium acetate 667 mg Tab Take 2 tablets by mouth 3 (three) times a day with meals. Take 2 tabs daily by mouth with meals. Per signed order from [facility] Dr. Johnson  Indications: low amount of calcium in the blood       carvediloL (COREG) 12.5 MG tablet Take 6.25 mg by mouth 2 (two) times a day.       cinacalcet (SENSIPAR) 30 MG tablet Take 60 mg by mouth daily. Take 1 pill by mouth every evening with dinner  Indications: hyperparathyroidism caused by chronic renal failure with dialysis       famotidine (PEPCID) 20 MG tablet Take 1 tablet (20 mg total) by mouth at bedtime. 90 tablet 3     folic acid/vit B complex and C (DIALYVITE ORAL) Take by mouth.       folic acid/vit B complex and C (RENAL VITAMIN ORAL) Take 1 capsule by mouth daily. Indications: Supplement       losartan (COZAAR) 100 MG tablet Take 50 mg by mouth daily. take 50 mg every evening for HTN  Indications: high blood pressure       senna-docusate (PERICOLACE) 8.6-50 mg tablet Take 1-2 tablets by mouth daily as needed for constipation. 60 tablet 11     calcium, as carbonate, (TUMS) 200 mg calcium (500 mg) chewable tablet Chew 2 tablets (400 mg total) 2 (two) times a day as needed for heartburn. 120 tablet 11     sertraline (ZOLOFT) 50 MG tablet Take 1 tablet (50 mg total) by mouth daily. 30 tablet 6     No current facility-administered medications for this visit.      Social History     Tobacco Use   Smoking Status Never Smoker   Smokeless Tobacco Never Used       OBJECTICE: /70   Pulse 73   Temp 98.8  F (37.1  C) (Oral)   Resp 16   Ht 5' 2\" (1.575 m)   Wt 139 lb (63 kg)   SpO2 97%   BMI 25.42 kg/m       No results found for this or any previous visit (from the past 24 hour(s)).     GEN-alert, appropriate, in no apparent distress   CV-regular " rate and rhythm with no murmur   RESP-lungs clear to auscultation   Psychiatric-appearance is well-groomed, speech is of normal fluency and rate, mood is depressed, affect is flat, thought content positive for some suicidal ideation but no intention or planning, negative for homicidal ideation.  Thought content processing negative for paranoid or delusional thinking.  Judgment and insight are intact.    Montrell Zarate

## 2021-06-11 NOTE — PROGRESS NOTES
Clinic Care Coordination Contact    Follow Up Progress Note      Assessment: f/u on transportation issue  Writer received a call from patient today stating his ride didn't show up for his dialysis today.   - Spoke Lidia from dialysis. Per Lidia, a ride was already set up with [facility] - [phone].     - Writer called and spoke with [facility]. Was told that they called patient and he didn't answer the phone but Transportation had the wrong phone number for patient. Updated [facility] with patient's correct phone number.  - Was told that they will ask the  to go back and  patient.     -Writer called patient back and instructed him to wait for his ride outside appt right away. Verbalized understanding.

## 2021-06-11 NOTE — TELEPHONE ENCOUNTER
Agree with below orders.  Please also let me know after your next visit if he seems to be taking his sertraline daily.  Thank you.

## 2021-06-11 NOTE — TELEPHONE ENCOUNTER
"I asked Digna Gutierrez while in his home visit today if he was taking his sertraline 50mg - 1 tab at  and he replied, \"yes I take it with all my other medications you set up for me every night\".     Please let me know if you need further information or have any concerns.    Thank you - Yakelin Jean  "

## 2021-06-11 NOTE — PROGRESS NOTES
Clinic Care Coordination Contact    Follow Up Progress Note      Assessment: f/u on depression/mood  Chart review completed today.   Spoke with patient via phone.     Per Chart review, patient was no show with PCP today.   - Patient states the ride didn't show up and he didn't know who to call.   - Transportation was set up with [employer].   - Patient agrees to reschedule this appt - Message sent to [facility] scheduling to assist.     [facility] referral   - Patient states no one has calls him yet from [facility] that he's aware of, pending.       Goals addressed this encounter:   Goals Addressed                 This Visit's Progress       Patient Stated      Other (pt-stated)   50%     Goal Statement: I would like to re-establish care with [facility] worker and in home therapist the next 90 days.   Date Goal set: 8/19/2020  Barriers: language barrier, lack of knowledge, lack of support  Strengths: motivate to learn, open to restart [facility] service   Date to Achieve By: 11/19/2020  Patient expressed understanding of goal: Yes    Action steps to achieve this goal:  1. I will answer my phone in  a timely manner.   2. I will call CHW with any concerns or questions.           Other (pt-stated)   100%     Goal Statement: CCC RN will do outreach calls monthly to coordinate my cares with transplant team so I can get on active kidney transplant list the next 90 days.   Date Goal set: 8/19/2020  Barriers: language barrier, lack of knowledge  Strengths: motivate to learn  Date to Achieve By: 11/19/2020  Patient expressed understanding of goal: Yes    Action steps to achieve this goal:  1. I will speak with CCC RN at outreach calls.   2. I will attend all my appts                    Plan:   1) Patient will attend his appt with PCP as scheduled.   2) CCC RN will f/u on 9/24/2020.

## 2021-06-11 NOTE — TELEPHONE ENCOUNTER
At the request of the PCP the CHW contacted Digna to schedule an appointment for October but Digna would prefer a phone appointment versus in-person and the CHW cannot schedule PCP Virtual or Phone appointments and therefore is forwarding the appointment request to the schedulers.    Digna is aware that a  will be calling him to schedule a phone appointment with his PCP.   no

## 2021-06-11 NOTE — TELEPHONE ENCOUNTER
Requesting order for recertification of skilled nursing every 2 weeks for 9 weeks for medication management and general health assessment. 3 PRN visits for medication changes, reassessments, and OASIS data collection.      Patient is asking for a lot of help coordinating bills, paperwork, and wants to apply for PCA. He is requesting an [facility] worker to come to his home and help him. Please send out a referral if appropriate.  Thanks

## 2021-06-11 NOTE — PROGRESS NOTES
Clinic Care Coordination Contact    Community Health Worker Follow Up    Goals:     Goals Addressed                 This Visit's Progress       Patient Stated      Other (pt-stated)   60%     Goal Statement: I would like to re-establish care with [facility] worker and in home therapist the next 90 days.     Date Goal set: 8/19/2020  Barriers: language barrier, lack of knowledge, lack of support  Strengths: motivate to learn, open to restart [facility] service   Date to Achieve By: 11/19/2020  Patient expressed understanding of goal: Yes    Action steps to achieve this goal:  1. I will complete a telephone visit with my new [facility] worker in the next 4 weeks.    Note: CHW sent email message to Krystyna at [facility] requesting update on who the new [facility] worker is for Digna.                  CHW Outreach Conversation:    Digna Gutierrez was reachable and understands that Dr. Zarate would like to see him via in-person or telehealth. Digna states he would like a telehealth appointment and understands that the CHW cannot schedule telehealth appointments and therefore he will answer the phone when the [facility] Schedulers call him to schedule a PCP appointment.    Stantonlenny does not know who his new [facility] worker is and therefore the CHW let him know the CHW would ask Krystyna at [facility].      CHW Next Follow-up: 10/29/2020    CHW Plan: Outreach as scheduled

## 2021-06-11 NOTE — TELEPHONE ENCOUNTER
Gopal Radford, this patient was supposed to follow-up with me about 3 weeks after his last visit, I do not see anything on the schedule.  Please help him in care coordination for a follow-up appointment with me for depression, appointment should be sometime in early to middle of October.  Thank you.

## 2021-06-11 NOTE — TELEPHONE ENCOUNTER
Refill Approved    Rx renewed per Medication Renewal Policy. Medication was last renewed on 8/29/19.    Chastity Julian, [facility] Triage/Med Refill 9/6/2020     Requested Prescriptions   Pending Prescriptions Disp Refills     SENEXON-S 8.6-50 mg tablet [Pharmacy Med Name: SENEXON-S 8.6-50 MG TABS 8.6-50 TAB] 60 tablet 3     Sig: TAKE 1-2 PILLS BY MOUTH EVERY DAY AS NEEDED FOR CONSTIPATION       GI Medications Refill Protocol Passed - 9/3/2020  3:12 PM        Passed - PCP or prescribing provider visit in last 12 or next 3 months.     Last office visit with prescriber/PCP: 9/3/2020 Montrell Zarate MD OR same dept: 9/3/2020 Montrell Zarate MD OR same specialty: 9/3/2020 Montrell Zarate MD  Last physical: Visit date not found Last MTM visit: Visit date not found   Next visit within 3 mo: Visit date not found  Next physical within 3 mo: Visit date not found  Prescriber OR PCP: Montrell Zarate MD  Last diagnosis associated with med order: 1. Slow transit constipation  - SENEXON-S 8.6-50 mg tablet [Pharmacy Med Name: SENEXON-S 8.6-50 MG TABS 8.6-50 TAB]; TAKE 1-2 PILLS BY MOUTH EVERY DAY AS NEEDED FOR CONSTIPATION  Dispense: 60 tablet; Refill: 3    If protocol passes may refill for 12 months if within 3 months of last provider visit (or a total of 15 months).

## 2021-06-12 NOTE — PROGRESS NOTES
"Clinic Care Coordination Contact    Follow Up Progress Note      Assessment: follow up on goals progression, other concerns.   - Chart review completed today.     1) PCA service.   - Per chart reviewed, home care  sent message to PCP today re: PCA service.     \" Patient is reporting severe fatigue from dialysis. He states he often cannot care for himself after dialysis because he is so tired. Reporting missed meals and household tasks due to this. Can clinic SW help set up a PCA assessment through his insurance?\"    2) med compliance.   - States home care setting up meds every 2 weeks + PRN.   - States he takes his meds as set up by home care nurse.   - no concerns.     3) Depression - states he feels okay but he's tired most days especially after dialysis.   - States he's able to sleep 4-6 hours per night.   - No appetite. States his weight is done to 61-63kg from 68-69kg the past 2-3 months. They weigh him every time he's at dialysis.   Goals addressed this encounter:   Goals Addressed                 This Visit's Progress       Patient Stated      COMPLETED: Other (pt-stated)   100%     Goal Statement: I would like to re-establish care with [facility] worker and in home therapist the next 90 days.     Date Goal set: 8/19/2020  Barriers: language barrier, lack of knowledge, lack of support  Strengths: motivate to learn, open to restart ARM service   Date to Achieve By: 11/19/2020  Patient expressed understanding of goal: Yes    Action steps to achieve this goal:  1. I will continue to meet with my [facility] worker through [facility].    Note: CHW sent email message to Krystyna at [facility] requesting who the [facility] worker is so that person can be added to the Care Team.          COMPLETED: Other (pt-stated)   100%     Goal Statement: I would like [facility] RN do outreach calls monthly to coordinate my cares with transplant team so I can get on active kidney transplant list the next 90 days.   Date Goal set: " 8/19/2020  Barriers: language barrier, lack of knowledge  Strengths: motivate to learn  Date to Achieve By: 11/19/2020  Patient expressed understanding of goal: Yes    Action steps to achieve this goal:  1. I will speak with CCC RN at outreach calls.   2. I will attend all my appts as schedule.           Other (pt-stated)        Goal Statement: I would like to explore if I could qualify for PCA service the next 90 days.     Date Goal set: 11/5/2020  Barriers: language barrier, impaired mobility, dialysis, complex medical dx  Strengths: Motivate to learn, home care service, always answer his phone.   Date to Achieve By: 2/5/2021  Patient expressed understanding of goal: Yes    Action steps to achieve this goal:  1. CCC SW to request PCA assessment   2. Patient will answer his phone or return calls in a timely manner.   3. Patient will call CHW with any concerns or questions.             Plan:   1) CCC SW to request PCA assessment  2) CCC RN will f/u on on 12/17/2020 or sooner if needed.

## 2021-06-12 NOTE — TELEPHONE ENCOUNTER
PCP approves orders for the following HC orders:    recertification of skilled nursing every 2 weeks for 9 weeks for medication set up and general health assessment. 3 PRN visits for medication changes, reassessments, and OASIS data collection.    Also see Lyudmila's note to you about PCA assessment.  Thanks.

## 2021-06-12 NOTE — TELEPHONE ENCOUNTER
Patient has no cinacalet 60mg in home.  Per pharmacy a prior authorization is required for this Medication.  They state they have sent request with no response from clinic.  If patient is to continue this medication could you please send a prior auth?  If no should he take something else for thyroid?    Thank you  Cierra Ayala, RN ([facility]) I

## 2021-06-12 NOTE — TELEPHONE ENCOUNTER
Requesting order for medical social worker to complete home/telephone visit as needed to assist patient with insurance needs. Patient is not getting prescriptions covered because he is supposed to have Medicare Part D and has not set this up.  Thanks

## 2021-06-12 NOTE — PROGRESS NOTES
Clinic Care Coordination Contact    Community Health Worker Follow Up    Goals:     Goals Addressed                 This Visit's Progress       Patient Stated      Other (pt-stated)   90%     Goal Statement: I would like to re-establish care with [facility] worker and in home therapist the next 90 days.     Date Goal set: 8/19/2020  Barriers: language barrier, lack of knowledge, lack of support  Strengths: motivate to learn, open to restart [facility] service   Date to Achieve By: 11/19/2020  Patient expressed understanding of goal: Yes    Action steps to achieve this goal:  1. I will continue to meet with my [facility] worker through [facility].    Note: CHW sent email message to Krystyna at [facility] requesting who the ARM[facility] worker is so that person can be added to the Care Team.          COMPLETED: Other (pt-stated)   100%     Goal Statement: I would like to apply for Medicare Part D the next 90 days.     Goal date: 10/13/2020  Barriers: language barrier, lack of knowledge  Strengths: motivate to learn  Date to Achieve By: 1/13/2021  Patient expressed understanding of goal: Yes    Personal Plan:  1. I am enrolled in Medicare Part D already and per my Dialysis SW there is nothing more I need to do at this time.     Main :  Mina Green - [phone]  Back up  - [place] - [phone]              CHW Outreach Conversation:    Digna reports that the Dialysis SW did speak to him about Medicare Part D and the CHW was able to confirm with the Dialysis SW that Digna has Medicare Part D coverage and that Digna has all of his medications.    Digna reports that he did receive a call from an [facility] worker but he states he does not know their name.    The CHW has sent a couple of messages to Krystyna at [facility] and has not heard back yet but will continue to try and find out the name and contact info for Digna's new [facility] worker.    Addendum: Per Krystyna Eidlenny needed all of his paperwork updated with them  and it took time to get him re-opened with ARM. Thew new ARM worker is Herlinda, [phone]. Krystyna states it will be a couple weeks before Digna hears from Herlinda.      CCC RN following up with Digna next Thursday 11/5.      CHW Next Follow-up: 12/3/2020    CHW Plan: Outreach to Digna and continue to try and reach Krystyna at [facility]

## 2021-06-12 NOTE — TELEPHONE ENCOUNTER
1. Requesting order for recertification of skilled nursing every 2 weeks for 9 weeks for medication set up and general health assessment. 3 PRN visits for medication changes, reassessments, and OASIS data collection.    2. Patient is reporting severe fatigue from dialysis. He states he often cannot care for himself after dialysis because he is so tired. Reporting missed meals and household tasks due to this. Can clinic SW help set up a PCA assessment through his insurance?    Thanks

## 2021-06-12 NOTE — PROGRESS NOTES
NADEGE received a request to assist pt with signing up for Medicare Part D to assist with payment of his prescription medications. NADEGE called [facility] line, [phone], they will assist pt once Open Enrollment starts this Thursday, Oct 15th - December 7th. NADEGE spoke with pt via [employer]  via  to explain. Pt states he knows his dialysis social worker. NADEGE advised him to ask her to assist him with competing this phone call to enroll. Medicare does not cover a home care  in the home as pt has access to  at dialysis and this would be a duplication of service. NADEGE called [employer] [facility], [phone], left message for Honey to assist pt with enrolling and return phone call to NADEGE. Pt states he is doing well today. NADEGE will remain involved until pt is enrolled in Medicare Part D.

## 2021-06-12 NOTE — TELEPHONE ENCOUNTER
Gopal Michelle,   Yes, I will ask NADEGE Hooks to place a referral for PCA assessment.   I will create a pca goal to track status.   Thanks for letting us know.   Lyudmila

## 2021-06-12 NOTE — PROGRESS NOTES
Clinic Care Coordination Contact    Follow Up Progress Note      Assessment: f/u on home care SW message re: Medicare Part D    - Called and spoke with NADEGE Méndez - dialysis requesting SW from dialysis to assist patient apply for Medicare part D.   - It would be easier for patient to get assist to apply for medicare part D while he's at dialysis 3-4 hours per day and it can take a long time to call senior linkage line.   - Honey states they will assist patient.     Goals addressed this encounter:   Goals        Patient Stated      Other (pt-stated)      Goal Statement: I would like to re-establish care with [facility] worker and in home therapist the next 90 days.     Date Goal set: 8/19/2020  Barriers: language barrier, lack of knowledge, lack of support  Strengths: motivate to learn, open to restart ARM service   Date to Achieve By: 11/19/2020  Patient expressed understanding of goal: Yes    Action steps to achieve this goal:  1. I will complete a telephone visit with my new [facility] worker in the next 4 weeks.    Note: CHW sent email message to Krystyna at [facility] requesting update on who the new [facility] worker is for Digna.          Other (pt-stated)      Goal Statement: I would like CCC RN do outreach calls monthly to coordinate my cares with transplant team so I can get on active kidney transplant list the next 90 days.   Date Goal set: 8/19/2020  Barriers: language barrier, lack of knowledge  Strengths: motivate to learn  Date to Achieve By: 11/19/2020  Patient expressed understanding of goal: Yes    Action steps to achieve this goal:  1. I will speak with CCC RN at outreach calls.   2. I will attend all my appts as schedule.           Other (pt-stated)      Goal Statement: I would like to apply for Medicare part D the next 90 days.   Goal date: 10/13/2020  Barriers: language barrier, lack of knowledge  Strengths: motivate to learn  Date to Achieve By: 1/13/2021  Patient expressed understanding of goal:  Yes    Action steps to achieve this goal:  1. I will work with dialysis social worker to apply for medicare part D    Main :  Mina [facility] - [phone]  Back up  - [place] - [phone]                    Plan:   1) Goal created today to track medicare status.

## 2021-06-12 NOTE — PROGRESS NOTES
NADEGE made fu tc to dialysis social worker. Spoke with Honey, she states Mina, [phone], is pt's current . Spoke with Mina, she states she called pt's pharmacy and pt's medications have been mailed to his home. Pts on dialysis are automatically enrolled in Part D but there is a couple of month delay in coverage which is what happened to this pt. She enrolled pt in Medicare Limited Income Net Program which covers his medications for now until Part D starts. ELICIA LIGHT.

## 2021-06-12 NOTE — PROGRESS NOTES
Clinic Care Coordination Contact    Follow Up Progress Note      Assessment: f/u on med compliance, mood/depression, and [facility] worker status.   Chart review completed today.   Spoke with patient via phone.     1) mood/depression - Sates he's doing better since he started taking Zoloff 50mg daily. Sates he takes his meds as set up by home care nurse every 2 weeks.   - denies suicidal/homocidal ideations.   - Has f/u appt with Dr cao via phone on 10/13/2020.   - states a friend helped him applied for public housing during open enrollment a few weeks ago so he was very happy about this.   - Patient sounded more upbeat today.   - He knows when to reach out to CCC team with any concerns or questions.   - continues dialysis 3x/wk. Has no concerns.     2) [facility] worker status - states someone called him today told him that he was re-assigned with Kiah - his previous [facility] worker.   - Writer left a vm message with Kiah to confirm, pending.     Goals addressed this encounter:   Goals Addressed                 This Visit's Progress       Patient Stated      Other (pt-stated)   100%     Goal Statement: CCC RN will do outreach calls monthly to coordinate my cares with transplant team so I can get on active kidney transplant list the next 90 days.   Date Goal set: 8/19/2020  Barriers: language barrier, lack of knowledge  Strengths: motivate to learn  Date to Achieve By: 11/19/2020  Patient expressed understanding of goal: Yes    Action steps to achieve this goal:  1. I will speak with CCC RN at outreach calls.   2. I will attend all my appts                        Plan:   1) Patient will speak with PCP on 10/13/2020. Reminded his appt today.   2) CCC RN will f/u on 11/5/2020

## 2021-06-12 NOTE — PROGRESS NOTES
"ASSESMENT AND PLAN:  Diagnoses and all orders for this visit:    Major depressive disorder with single episode, in partial remission (H)  Excellent response to sertraline.  Counseling done today with the patient with the help of a professional .  Continue sertraline daily for at least 6 months.  Routine follow-up by phone again in 1 month, sooner follow-up if needed.    ESRD (end stage renal disease) on dialysis (H)  Stable.  He will continue to follow-up with his 3 times per week dialysis and with the transplant clinic.  I feel like his mental health issues have improved to the point now where he would be a great candidate for transplant.      Reviewed the risks and benefits of the treatment plan with the patient and/or caregiver and we discussed indications for routine and emergent follow-up.    Telephone visit done in lieu of a clinic visit because of ongoing concerns and restrictions with the COVID-19 pandemic.  Total telephone minutes with patient - 14      Patient is being evaluated via a billable telephone visit.      The patient has been notified of following:     \"This telephone visit will be conducted via a call between you and your physician/provider. We have found that certain health care needs can be provided without the need for a physical exam.  This service lets us provide the care you need with a short phone conversation.  If a prescription is necessary we can send it directly to your pharmacy.  If lab work is needed we can place an order for that and you can then stop by our lab to have the test done at a later time.    Telephone visits are billed at different rates depending on your insurance coverage. During this emergency period, for some insurers they may be billed the same as an in-person visit.  Please reach out to your insurance provider with any questions.    If during the course of the call the physician/provider feels a telephone visit is not appropriate, you will not be " "charged for this service.\"    Patient has given verbal consent to a Telephone visit? Yes    SUBJECTIVE: 34-year-old male who was recently diagnosed with depression, see my previous note for details.  Since then, he has been taking sertraline every day.  He tolerates the medication well, has not noticed any side effects or problems.  Patient reports that he has been doing daily walking or other exercise routine.  He has noticed good improvement in his mood.  He feels like he is able to look forward to enjoy things in his daily life.  He continues with 3 times per week dialysis.    Past Medical History:   Diagnosis Date     Chronic kidney disease      ESRD (end stage renal disease) (H)      Solitary kidney      Patient Active Problem List   Diagnosis     ESRD (end stage renal disease) on dialysis (H)     GERD (gastroesophageal reflux disease)     Polypharmacy     Slow transit constipation     Language barrier affecting health care     Cough with hemoptysis     Epigastric pain     Essential hypertension     Hypertensive urgency     ESRD (end stage renal disease) (H)     LVH (left ventricular hypertrophy)     Major depressive disorder with single episode, in partial remission (H)     Current Outpatient Medications   Medication Sig Dispense Refill     calcium acetate 667 mg Tab Take 2 tablets by mouth 3 (three) times a day with meals. Take 2 tabs daily by mouth with meals. Per [facility] from [facility] Dr. Johnson  Indications: low amount of calcium in the blood       calcium, as carbonate, (TUMS) 200 mg calcium (500 mg) chewable tablet Chew 2 tablets (400 mg total) 2 (two) times a day as needed for heartburn. 120 tablet 11     carvediloL (COREG) 6.25 MG tablet Take 6.25 mg by mouth 2 (two) times a day with meals. Take one tablet 2 times daily for blood pressure  Indications: high blood pressure       cinacalcet (SENSIPAR) 60 MG tablet Take 60 mg by mouth daily. Take one tablet daily by mouth  Indications: " hyperparathyroidism caused by chronic renal failure with dialysis       famotidine (PEPCID) 20 MG tablet Take 1 tablet (20 mg total) by mouth at bedtime. 90 tablet 3     folic acid/vit B complex and C (DIALYVITE ORAL) Take by mouth.       losartan (COZAAR) 50 MG tablet Take 50 mg by mouth daily. Take 1 tablet daily at bedtime for hypertension  Indications: high blood pressure       SENEXON-S 8.6-50 mg tablet TAKE 1-2 PILLS BY MOUTH EVERY DAY AS NEEDED FOR CONSTIPATION 60 tablet 11     sertraline (ZOLOFT) 50 MG tablet Take 1 tablet (50 mg total) by mouth daily. 30 tablet 6     No current facility-administered medications for this visit.      Social History     Tobacco Use   Smoking Status Never Smoker   Smokeless Tobacco Never Used       OBJECTICE: There were no vitals taken for this visit.           Montrell Zarate

## 2021-06-13 NOTE — PROGRESS NOTES
Clinic Care Coordination Contact    Community Health Worker Follow Up    Goals:     Goals Addressed                 This Visit's Progress       Patient Stated      COMPLETED: Other (pt-stated)   100%     Goal statement: I would like to be assessed for PCA services through [facility] within the next 60 days.    Date goal set: 11/13/20  Barriers: Language barrier, impaired mobility, complex medical dx, end stage renal disease & on dialysis  Strengths: Motivated to learn, established with home care, engaged with [facility] team  Date to achieve by: 1/13/21  Patient expressed understanding of goal: Yes    Personal Plan:  1.  I was approved for 4 hours of PCA services and I will work with my PCA through [facility], [address] [place] [address] at [phone].            CHW Outreach Conversation:    Digna reports that he was approved for 4 hours of PCA services and is working with [facility]. The CHW added the PCA Agency to the Care Team.    He has no other concerns at this time and continues to work with his [facility] Worker Kiah.        CHW Next Follow-up: 2 months    CHW Plan: CCC RN did not approve of Maintenance but keeping outreach at 2 months as there are no new goals established for the CHW to follow up on in the next month.

## 2021-06-13 NOTE — PROGRESS NOTES
Clinic Care Coordination Contact    Care Team Conversation - Referral    [facility] NADEGE called [facility] intake line on 11/13/20 at 4:00pm to request a [facility] PCA assessment on pt's behalf. [facility]  Allyson took referral information and will assign pt's case to an  within the next 2-3 business days. The  will contact pt directly with an  to schedule a virtual assessment within 2-4 weeks.    [facility] during this encounter. New goal created to f/u on PCA referral. See below.     Goals        Patient Stated      Other (pt-stated)      Goal statement: I would like to be assessed for PCA services through [facility] within the next 60 days.    Date goal set: 11/13/20  Barriers: Language barrier, impaired mobility, complex medical dx, end stage renal disease & on dialysis  Strengths: Motivated to learn, established with home care, engaged with [facility] team  Date to achieve by: 1/13/21  Patient expressed understanding of goal: Yes    Action steps to achieve this goal:  1.  CCC NADEGE submitted request for [facility] PCA assessment through [facility] on 11/13/20.  2.  I will answer the phone when [facility] calls to schedule a virtual [facility] PCA assessment.  3.  I will provide any information/documentation necessary to assist with the assessment process.  4.  I will communicate with [facility] team at outreach.

## 2021-06-13 NOTE — PROGRESS NOTES
Dr. Johnson prescribed meclizine 12.5mg TID PRN per ESTELA Byers for [facility]. [phone].   I entered medication on med list.  Please follow up with patient teaching as needed.   Thanks   Lashanda Mayberry RN

## 2021-06-13 NOTE — PROGRESS NOTES
"Digna Gutierrez is a 35 y.o. male who is being evaluated via a billable telephone visit.      The patient has been notified of following:     \"This telephone visit will be conducted via a call between you and your physician/provider. We have found that certain health care needs can be provided without the need for a physical exam.  This service lets us provide the care you need with a short phone conversation.  If a prescription is necessary we can send it directly to your pharmacy.  If lab work is needed we can place an order for that and you can then stop by our lab to have the test done at a later time.    Telephone visits are billed at different rates depending on your insurance coverage. During this emergency period, for some insurers they may be billed the same as an in-person visit.  Please reach out to your insurance provider with any questions.    If during the course of the call the physician/provider feels a telephone visit is not appropriate, you will not be charged for this service.\"    Patient has given verbal consent to a Telephone visit? Yes    What phone number would you like to be contacted at? [phone]    Patient would like to receive their AVS by AVS Preference: Mail a copy.      Phone call duration:  14 minutes    ASSESMENT AND PLAN:  Diagnoses and all orders for this visit:    Major depressive disorder with single episode, in partial remission (H)  Stable.  Continue sertraline.  Counseling done today with the patient with help of a professional .  Continue his community health support, he is getting intensive follow-up from our care management team and community resources.    Insomnia, unspecified type  -     melatonin 3 mg cap; Take 3-6 mg by mouth at bedtime as needed.  Dispense: 60 capsule; Refill: 11    Acute bilateral low back pain without sciatica  -     acetaminophen (TYLENOL EXTRA STRENGTH) 500 MG tablet; Take 1 tablet (500 mg total) by mouth every 6 (six) hours as needed for " pain.  Dispense: 90 tablet; Refill: 6    I sent a message to his home nurse about the medication changes detailed above.    Reviewed the risks and benefits of the treatment plan with the patient and/or caregiver and we discussed indications for routine and emergent follow-up.        SUBJECTIVE: 35-year-old male reports continued good tolerance of his sertraline.  His mood continues to be better.  However, he has been struggling asleep.  He thinks that difficulty with sleeping is a combination of noise from the apartment above him and increased anxious thinking at bedtime.  He currently does not use anything to help with sleep.  Patient also reports some mild to moderate low back pain over the last few days.  No injury he can think of.  No radiation down the legs.    Past Medical History:   Diagnosis Date     Chronic kidney disease      ESRD (end stage renal disease) (H)      Solitary kidney      Patient Active Problem List   Diagnosis     ESRD (end stage renal disease) on dialysis (H)     GERD (gastroesophageal reflux disease)     Polypharmacy     Slow transit constipation     Language barrier affecting health care     Cough with hemoptysis     Epigastric pain     Essential hypertension     Hypertensive urgency     ESRD (end stage renal disease) (H)     LVH (left ventricular hypertrophy)     Major depressive disorder with single episode, in partial remission (H)     Current Outpatient Medications   Medication Sig Dispense Refill     acetaminophen (TYLENOL EXTRA STRENGTH) 500 MG tablet Take 1 tablet (500 mg total) by mouth every 6 (six) hours as needed for pain. 90 tablet 6     calcium acetate 667 mg Tab Take 2 tablets by mouth 3 (three) times a day with meals. Take 2 tabs daily by mouth with meals. Per signed order from [facility] Dr. Johnson  Indications: low amount of calcium in the blood       calcium, as carbonate, (TUMS) 200 mg calcium (500 mg) chewable tablet Chew 2 tablets (400 mg total) 2 (two) times a  day as needed for heartburn. 120 tablet 11     carvediloL (COREG) 6.25 MG tablet Take 6.25 mg by mouth 2 (two) times a day with meals. Take one tablet 2 times daily for blood pressure  Indications: high blood pressure       cinacalcet (SENSIPAR) 90 MG tablet Take 90 mg by mouth daily. 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 90 tablet 3     folic acid/vit B complex and C (DIALYVITE ORAL) Take by mouth.       losartan (COZAAR) 50 MG tablet Take 50 mg by mouth daily. Take 1 tablet daily at bedtime for hypertension  Indications: high blood pressure       melatonin 3 mg cap Take 3-6 mg by mouth at bedtime as needed. 60 capsule 11     SENEXON-S 8.6-50 mg tablet TAKE 1-2 PILLS BY MOUTH EVERY DAY AS NEEDED FOR CONSTIPATION 60 tablet 11     sertraline (ZOLOFT) 50 MG tablet Take 1 tablet (50 mg total) by mouth daily. 30 tablet 6     No current facility-administered medications for this visit.      Social History     Tobacco Use   Smoking Status Never Smoker   Smokeless Tobacco Never Used   Tobacco Comment    no passive exposure       OBJECTICE: There were no vitals taken for this visit.     No results found for this or any previous visit (from the past 24 hour(s)).        Montrell Zarate

## 2021-06-13 NOTE — PROGRESS NOTES
Clinic Care Coordination Contact    Follow Up Progress Note      Assessment: follow up on goals and to get on active kidney transplant list  - Chart review completed today.   - Spoke with patient and Kiah garcia via phone.     1) Follow up on goals that needed to be met prior to another evaluation by kidney transplant team.   - See below goals that needed to be met before patient could get another eval that was created back in August, 2020.     Per Margaret, transplant team has several concerns regarding patient below:     1) Depression - patient will need to establish care with a psychiatrist for his depression - Patient has appt scheduled with Dr Zarate on 9/1/2020. Patient reports of suicidal thoughts.   - [facility] RN will follow up with transplant care coordinator if patient needs to be seen by a psychiatrist or seeing PCP for depression mgmt should be good.      2) [facility] worker - Patient needs to re-establish care with [facility] worker to assist him with care partner.     - Already established care with ESP[facility] Dupont through [facility]. Kiah calls patient weekly.      3) Patient needs a care partner or identify someone who could assist him post surgery with med mgmt, ADLs, f/u appts etc in home.      - Approved for PCA service 4 hours per day.   - Patient is working with [facility] to find him a pca that could speak English and will be able to assist him as needed.   - Writer and patient called pca agency today together and pca agency is still waiting for Service Agreement to be faxed to the agency from the [facility].     4) Per Margaret, to call backin 3-6 months for follow up sooner after met all goals.      - [facility] RN will call [facility] kidney transplant care coordinator - Mirian Mirza RN once all goals completed around Jan, 2021.     2) Annual physical exam needed  - Per chart reviewed, patient needs annual physical exam.   - [facility] RN would like him to have annual physical exam completed prior  to calling kidney transplant care coordinator to schedule another eval with them.   - Assisted patient scheduled appt with PCP on 12/29/2020 at 12:40pm. CHW to remind patient of his appt.     Goals addressed this encounter:   Goals        Patient Stated      Medical (pt-stated)      Goal Statement: I would like to be evaluated to get on active transplant list the next 90 days.   Date Goal set: 12/17/2020  Barriers: language barrier, lack of knowledge, lack of support  Strengths: Agrees to answer the phone, agrees to work on goals   Date to Achieve By: 3/17/2020  Patient expressed understanding of goal: Yes    Action steps to achieve this goal:  1. I will speak with CCC RN at outreach calls.   2. I will take my meds as prescribed and set up by home care nurse.   3. I will answer my phone or return calls in a timely manner.   4. I will call CCC RN with any concerns or questions.   5. I will continue to work on my goals with [facility] radha Dupont.           Other (pt-stated)      Goal Statement: I would attend my annual physical exam with PCP the next 30 days.   Date Goal set: 12/17/2020  Barriers: language barrier, lack of knowledge, lack of support  Strengths: Agrees to answer the phone, agrees to work on goals   Date to Achieve By: 3/17/2020  Patient expressed understanding of goal: Yes    Action steps to achieve this goal:  1) Patient will attend his annual physical exam with Dr Zarate on 12/29/2020 @ 12:40pm.   2) Patient will call CHW with any concerns or questions.             Plan:   1) CHW to remind patient of his appt with PCP on 12/29/2020 @ 12:40pm.   2) CCC RN will follow up on in 45 days.

## 2021-06-14 NOTE — PROGRESS NOTES
CHW did not outreach to patient today as the patient and their PCA spoke with the CCC RN 2 days ago on 2/1/2021 regarding appointments that are coming up tomorrow.    CHW Outreach: 1 month    CHW Plan: Review chart and outreach to patient

## 2021-06-14 NOTE — PROGRESS NOTES
Clinic Care Coordination Contact    Follow Up Progress Note      Assessment: received incoming call from [facility] radha Dupont today with patient on the line.    1) housing   - Patient would like to add his name to the lease of current address for housing  - Kiah would like to know if transplant would be okay with current housing ( he doesn't have his own room).   - CCC RN will consult with transplant team after colonoscopy procedure completed. Scheduled on 2/4/2021    Goals addressed this encounter:   Goals Addressed                 This Visit's Progress       Patient Stated      Medical (pt-stated)        Goal Statement: I would like to be evaluated to get on active transplant list the next 90 days.   Date Goal set: 12/17/2020  Barriers: language barrier, lack of knowledge, lack of support  Strengths: Agrees to answer the phone, agrees to work on goals   Date to Achieve By: 3/17/2020  Patient expressed understanding of goal: Yes    Action steps to achieve this goal:  1. I will speak with CCC RN at outreach calls.   2. I will take my meds as prescribed and set up by home care nurse.   3. I will answer my phone or return calls in a timely manner.   4. I will call CCC RN with any concerns or questions.   5. I will continue to work on my goals with [facility] radha Dupont.   6. Need to clarify if transplant team okay with patient getting on the lease of housing per Kiah.         Other (pt-stated)   20%     Goal Statement: I will attend my colonoscopy appt in February, 2021.   Date Goal set: 12/29/2020  Barriers: language barrier, lack of knowledge, lack of support  Strengths: Agrees to answer the phone, agrees to work on goals   Date to Achieve By: 2/29/2021  Patient expressed understanding of goal: Yes    Action steps to achieve this goal:  1. I will attend my colonoscopy appt on 2/4/2021 @ 10:15am.   2. I will have K+ lab completed at dialysis on 2/3/2021. CCC RN will call dialysis to request this.   3. I will  call CCC RN or CHW with any concerns or questions.   [facility] Phone: [phone] - [place] location               Outreach Frequency: monthly    Plan:   1) CCC RN will follow up on 2/3/2021

## 2021-06-14 NOTE — PROGRESS NOTES
Clinic Care Coordination Contact    Follow Up Progress Note      Assessment: follow up on K+ lab draw prior to colonoscopy procedure  - Chart review completed today.   - Spoke with patient, PCA,- Adalberto Ronquillo, Marleni Byers RN, BERENICE - Amira and [employer] transportation.       1) Colonoscopy procedure - [facility]   - CCC RN called [facility] today to confirm if patient could have his K+ level the day before on 2/3/2021.   - Spoke with BERENICE Collier today and confirmed that patient needs K+ level 3 hours before the procedure which was different then what was told to CCC RN before.     - Lab only appt scheduled for K+ lab on 2/4/2021 at 8:45am - need to be within 3 hours prior to colonoscopy procedure. Amira also will send a message to the provider who will perform the procedure with the update and they will call CCC RN with changes.     - CCC RN spoke with PCA and confirmed she can take patient to both appts on 2/4/2021.     - CCC RN called [employer] transportation to cancel the ride for Thursday.       Goals addressed this encounter:   Goals Addressed                 This Visit's Progress       Patient Stated      Medical (pt-stated)   20%     Goal Statement: I would like to be evaluated to get on active transplant list the next 90 days.     Date Goal set: 12/17/2020  Barriers: language barrier, lack of knowledge, lack of support  Strengths: Agrees to answer the phone, agrees to work on goals   Date to Achieve By: 3/17/2020  Patient expressed understanding of goal: Yes    Action steps to achieve this goal:  1. I will speak with CCC RN at outreach calls.   2. I will take my meds as prescribed and set up by home care nurse.   3. I will answer my phone or return calls in a timely manner.   4. I will call CCC RN with any concerns or questions.   5. I will continue to work on my goals with [facility] worker - Kiah.   6. Need to clarify if transplant team okay with patient getting on the lease of housing per Kiah.          Other (pt-stated)   50%     Goal Statement: I will attend my colonoscopy appt in February, 2021.     Date Goal set: 12/29/2020  Barriers: language barrier, lack of knowledge, lack of support  Strengths: Agrees to answer the phone, agrees to work on goals   Date to Achieve By: 2/29/2021  Patient expressed understanding of goal: Yes    Action steps to achieve this goal:  1. I will attend my colonoscopy appt on 2/4/2021 @ 10:15am. [address] [place] [address]  2. I will attend lab only appt on 2/4/2021 at 8:45am - [address] [id number]  3. I will call CCC RN or CHW with any concerns or questions.     Transportation arranged by CHW - [employer] Conf#: [id number], [employer], [phone] - called canceled on 2/1/2021.     [facility] -  [phone] - [address], [address], [place] [address]            Plan:   1) Patient will attend his lab appt and colonoscopy appt on 2/4/2021 at 8:45am and 10:15am.   2) CCC RN will follow up on  2/25/2021

## 2021-06-14 NOTE — PROGRESS NOTES
ASSESMENT AND PLAN:    Physical, Health Maitenence -   Reviewed healthy lifestyle, diet, exercise, vitamins, and follow-up plan today with patient.  Reviewed age appropriate cancer and other screening recommendations.  Immunization review and update done.  Reviewed indicated lab tests, see lab orders.     -     Hepatitis A adult 2 dose IM (19 yr & older)  -     Pneumococcal polysaccharide vaccine 23-valent 3 yo or older, subq/IM      ESRD (end stage renal disease) on dialysis (H)  -     Pneumococcal polysaccharide vaccine 23-valent 3 yo or older, subq/IM  Patient is on track to be added to the active transplant list potentially next month.  He will continue to work with our care management team.  -     Ambulatory referral for Colonoscopy    Major depressive disorder with single episode, in partial remission (H)  Good response to sertraline, now under good control.  Continue sertraline daily.  Counseling done today with the patient with help of a professional .  We will continue with his community support as well as coordination with our care management team.    Blood in stool  -     Ambulatory referral for Colonoscopy  Will ask his care management team to help coordinate this.  We will likely need to get this completed prior to him being actively listed for transplant.  Patient watched a informational educational video today on the procedure.          HPI: 35-year-old male here for his physical.  No other concerns.  Seeing him recently for depression.  Patient reports that he is taking his sertraline every day and is tolerating the medication well.  He has noticed good improvement in his mood.  He is now sleeping well.  No suicidal thinking.    Patient continues to work with his nephrology clinic and care management team with goal of being actively listed for transplant.  He continues to take  3-day a week dialysis.    ROS: No chest pain, no shortness of breath, no blood in the urine, he does get some  intermittent blood in the stool over the past 1 month.  He has never had that before.  Remainder of review of systems is as above or negative.      Past Medical History:   Diagnosis Date     Chronic kidney disease      ESRD (end stage renal disease) (H)      Solitary kidney        Current Outpatient Medications   Medication Sig Dispense Refill     acetaminophen (TYLENOL EXTRA STRENGTH) 500 MG tablet Take 1 tablet (500 mg total) by mouth every 6 (six) hours as needed for pain. 90 tablet 6     calcium acetate 667 mg Tab Take 2 tablets by mouth 3 (three) times a day with meals. Take 2 tabs daily by mouth with meals. Per signed order from [facility] Dr. Johnson  Indications: low amount of calcium in the blood       calcium, as carbonate, (TUMS) 200 mg calcium (500 mg) chewable tablet Chew 2 tablets (400 mg total) 2 (two) times a day as needed for heartburn. 120 tablet 11     carvediloL (COREG) 6.25 MG tablet Take 3.125 mg by mouth 2 (two) times a day with meals. Take one tablet 2 times daily for blood pressure  Indications: high blood pressure       cinacalcet (SENSIPAR) 90 MG tablet Take 90 mg by mouth daily. 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 90 tablet 3     folic acid/vit B complex and C (DIALYVITE ORAL) Take by mouth.       losartan (COZAAR) 50 MG tablet Take 50 mg by mouth daily. Take 1 tablet daily at bedtime for hypertension  Indications: high blood pressure       meclizine (ANTIVERT) 12.5 mg tablet Take 12.5 mg by mouth 3 (three) times a day as needed for dizziness. Take 1 tab three times daily as needed.  Indications: motion sickness, sensation of spinning or whirling       melatonin 3 mg cap Take 3-6 mg by mouth at bedtime as needed. 60 capsule 11     SENEXON-S 8.6-50 mg tablet TAKE 1-2 PILLS BY MOUTH EVERY DAY AS NEEDED FOR CONSTIPATION 60 tablet 11     sertraline (ZOLOFT) 50 MG tablet  Take 1 tablet (50 mg total) by mouth daily. 30 tablet 6     No current facility-administered medications for this visit.        Patient Active Problem List   Diagnosis     ESRD (end stage renal disease) on dialysis (H)     GERD (gastroesophageal reflux disease)     Polypharmacy     Slow transit constipation     Language barrier affecting health care     Cough with hemoptysis     Epigastric pain     Essential hypertension     Hypertensive urgency     ESRD (end stage renal disease) (H)     LVH (left ventricular hypertrophy)     Major depressive disorder with single episode, in partial remission (H)       Social History     Socioeconomic History     Marital status: Single     Spouse name: None     Number of children: None     Years of education: None     Highest education level: None   Occupational History     None   Social Needs     Financial resource strain: None     Food insecurity     Worry: None     Inability: None     Transportation needs     Medical: None     Non-medical: None   Tobacco Use     Smoking status: Never Smoker     Smokeless tobacco: Never Used     Tobacco comment: no passive exposure   Substance and Sexual Activity     Alcohol use: No     Drug use: No     Sexual activity: None   Lifestyle     Physical activity     Days per week: None     Minutes per session: None     Stress: None   Relationships     Social connections     Talks on phone: None     Gets together: None     Attends [employer] service: None     Active member of club or organization: None     Attends meetings of clubs or organizations: None     Relationship status: None     Intimate partner violence     Fear of current or ex partner: None     Emotionally abused: None     Physically abused: None     Forced sexual activity: None   Other Topics Concern     None   Social History Narrative     None       Social History     Tobacco Use   Smoking Status Never Smoker   Smokeless Tobacco Never Used   Tobacco Comment    no passive exposure        OBJECTICE: BP 98/68   Pulse 85   Temp 99.5  F (37.5  C) (Oral)   Resp 18   Wt 140 lb 4 oz (63.6 kg)   SpO2 98%   BMI 25.65 kg/m        Gen - alert, orientated, NAD  Eyes - fundascopic exam limited by the undialated pupil but looks symmetric  ENT - oropharynx clear, TMs clear  Neck - supple, no palpable mass or lymphadenopathy  CV - RRR, no murmur  Resp - lungs CTA  Ab - soft, nontender, no palpable mass or organomegaly   - normal appearance to the external genetalia, normal testicular exam bilaterally, no hernia  Extrem - warm, no edema, left arm dialysis access fistula.  Neuro - CN II-XII intact, strength, sensation intact and symmetric  Skin - no rash, no atypical appearing lesions seen.  Psychiatric-appearance is well-groomed, speech of normal fluency and rate, mood is improving, affect is brighter, thought content negative for suicidal or homicidal ideation, thought processing negative for paranoid or delusional thinking.  Judgment and insight are intact.        Montrell Zarate   2:34 PM 12/29/2020

## 2021-06-14 NOTE — PROGRESS NOTES
Clinic Care Coordination Contact    Follow Up Progress Note      Assessment: Colonoscopy appt  - Chart review completed today.   - Patient was seen by PCP today for annual physical exam.   - PCP referral him to have colonoscopy for blood in stools    1) Colonoscopy appt  - Assisted patient scheduled colonoscopy appt on 2/4/2021 @ 10:15am.   - CHW to assist with transportation.   - [facility] will mail out appt information to patient.   - Patient will need K+ lab completed at dialysis the day before. CCC RN will call dialysis on 2/3/2021 to request this lab to be done and patient will need to bring it to [facility] on appt day.     Goals addressed this encounter:   Goals Addressed                 This Visit's Progress       Patient Stated      COMPLETED: Other (pt-stated)   100%     Goal Statement: I would attend my annual physical exam with PCP the next 30 days.   Date Goal set: 12/17/2020  Barriers: language barrier, lack of knowledge, lack of support  Strengths: Agrees to answer the phone, agrees to work on goals   Date to Achieve By: 3/17/2020  Patient expressed understanding of goal: Yes    Action steps to achieve this goal:  1) Patient will attend his annual physical exam with Dr Zarate on 12/29/2020 @ 12:40pm.   2) Patient will call CHW with any concerns or questions.         Other (pt-stated)   10%     Goal Statement: I will attend my colonoscopy appt in February, 2021.   Date Goal set: 12/29/2020  Barriers: language barrier, lack of knowledge, lack of support  Strengths: Agrees to answer the phone, agrees to work on goals   Date to Achieve By: 2/29/2021  Patient expressed understanding of goal: Yes    Action steps to achieve this goal:  1. I will attend my colonoscopy appt on 2/4/2021 @ 10:15am.   2. I will have K+ lab completed at dialysis on 2/3/2021. CCC RN will call dialysis to request this.   3. I will call CCC RN or CHW with any concerns or questions.   [facility] Phone: [phone] [facility]                Plan:   1) CHW to assist with transportation for [facility] appt on 2/4/2021. [facility]  2) CCC RN will call dailysis on 2/3/2021 to request K+ lab draw.

## 2021-06-15 NOTE — PROGRESS NOTES
Clinic Care Coordination Contact    Follow Up Progress Note      Assessment: to review colonoscopy prep  - spoke with patient today via phone.  - Patient states she was able to  his colonoscopy prep the pharmacy.   - Reviewed colonoscopy prep instructions with him today and reminder provided on lab appt and colonoscopy appt on 3/11/2021.   - States his PCA will drive him to appt.     Goals addressed this encounter:   Goals Addressed                 This Visit's Progress       Patient Stated      Other (pt-stated)   40%     Goal Statement: I will attend my colonoscopy procedure the ext 60 days.   Date Goal set: 2/25/2021  Barriers: language barrier, lack of knowledge, lack of support  Strengths: Agrees to answer the phone, agrees to work on goals   Date to Achieve By: 4/25/2021  Patient expressed understanding of goal: Yes    Action steps to achieve this goal:  1. I will attend my colonoscopy appt on 3/11/2021 -K+ lab at 8:30am and procedure starts at 10:45am.   2. Patient will  colonoscopy prep at [facility] on [address] 2-3 days prior to the procedure.  3. I will call CCC RN or CHW with any concerns or questions.     Transportation: PCA bringing patient to [facility] appointment    [facility] -  [phone] - [address], [address], [place] [address]            Plan:   1) Patient will attend his colonoscopy appt on 3/11/2021.   2) Per ESPINOZA Dupont - she spoke with transplant team and was told that patient would need neuro-psych eval.   3) CCC RN will follow up on above message with next outreach. CCC RN would like patient to complete colonoscopy procedure first then address one goal at a time so it's not too overwhelming to patient because patient goes to dialysis 3x/week. CCC RN will outreach to patient on 3/25/2021 or sooner if needed.

## 2021-06-15 NOTE — PROGRESS NOTES
Clinic Care Coordination Contact    Follow Up Progress Note      Assessment: follow up on goals  - Chart review completed today.   - spoke with patient today. He was kind of short with CCC RN while on the phone.       1) Colonoscopy procedure   - Per chart review, Patient did attend his colonoscopy appt on 2/4/2021 but they had to abort the procedure because too much stools in the colon.   - Appt was rescheduled on 3/11/2021 at 8:30am - lab first ( K+ lab) then 10:45am.     - CCC RN will reach out to [facility] transplant clinic post colonoscopy procedure.     Goals addressed this encounter:   Goals Addressed                 This Visit's Progress       Patient Stated      Medical (pt-stated)   30%     Goal Statement: I would like to be evaluated to get on active transplant list the next 90 days.     Date Goal set: 12/17/2020  Barriers: language barrier, lack of knowledge, lack of support  Strengths: Agrees to answer the phone, agrees to work on goals   Date to Achieve By: 3/17/2020  Patient expressed understanding of goal: Yes    Action steps to achieve this goal:  1. I will speak with CCC RN at outreach calls.   2. I will take my meds as prescribed and set up by home care nurse.   3. I will answer my phone or return calls in a timely manner.   4. I will call CCC RN with any concerns or questions.   5. I will continue to work on my goals with [facility] worker - Kiah.   6. Need to clarify if transplant team okay with patient getting on the lease of housing per Kiah.         Other (pt-stated)   20%     Goal Statement: I will attend my colonoscopy procedure the ext 60 days.   Date Goal set: 2/25/2021  Barriers: language barrier, lack of knowledge, lack of support  Strengths: Agrees to answer the phone, agrees to work on goals   Date to Achieve By: 4/25/2021  Patient expressed understanding of goal: Yes    Action steps to achieve this goal:  1. I will attend my colonoscopy appt on 3/11/2021 -K+ lab at 8:30am and procedure  starts at 10:45am.   2. Patient will  colonoscopy prep at [facility] on [address] 2-3 days prior to the procedure.  3. I will call CCC RN or CHW with any concerns or questions.     Transportation: PCA bringing patient to [facility] appointment    [facility] -  [phone] - [address], [address], [place] [address]               Outreach Frequency: 6 weeks    Plan:   1) Patient will attend his colonoscopy procedure on 3/11/2021 - K+ lab at 8:30am and procedure at 10:45am   2) CCC RN will follow up on 3/8/2021 to review pre instructions            -

## 2021-06-15 NOTE — PROGRESS NOTES
[facility] RN outreached to Digna last week on 2/25 and updated goals.    CHW will not outreach to Digna today since he just spoke with the RN.     [facility] RN plans to outreach to Digna next week on 3/8.      CHW Outreach: 3/30/2021    CHW Plan: Review chart and outreach to the patient.

## 2021-06-15 NOTE — TELEPHONE ENCOUNTER
Hi Dr. Zarate,    I saw Digna Gutierrez for an OASIS re-cert. He is not able to manage the medications and would like to continue home care services for Comprehensive skilled nurse visits for medication management, VS, assess CP, respiratory and home safety 1xEOW x10 weeks with 3 PRN SNVs    Please respond with orders    Thank you,     Yamila Gallego RN  ACF

## 2021-06-16 PROBLEM — Z60.3 LANGUAGE BARRIER AFFECTING HEALTH CARE: Status: ACTIVE | Noted: 2019-08-15

## 2021-06-16 PROBLEM — N18.6 ESRD (END STAGE RENAL DISEASE) (H): Status: ACTIVE | Noted: 2019-08-19

## 2021-06-16 PROBLEM — N18.6 ESRD (END STAGE RENAL DISEASE) ON DIALYSIS (H): Status: ACTIVE | Noted: 2018-05-17

## 2021-06-16 PROBLEM — Z75.8 LANGUAGE BARRIER AFFECTING HEALTH CARE: Status: ACTIVE | Noted: 2019-08-15

## 2021-06-16 PROBLEM — R04.2 COUGH WITH HEMOPTYSIS: Status: ACTIVE | Noted: 2019-08-15

## 2021-06-16 PROBLEM — F32.4 MAJOR DEPRESSIVE DISORDER WITH SINGLE EPISODE, IN PARTIAL REMISSION (H): Status: ACTIVE | Noted: 2020-10-13

## 2021-06-16 PROBLEM — I51.7 LVH (LEFT VENTRICULAR HYPERTROPHY): Status: ACTIVE | Noted: 2019-08-29

## 2021-06-16 PROBLEM — Z99.2 ESRD (END STAGE RENAL DISEASE) ON DIALYSIS (H): Status: ACTIVE | Noted: 2018-05-17

## 2021-06-16 PROBLEM — I10 ESSENTIAL HYPERTENSION: Status: ACTIVE | Noted: 2019-08-15

## 2021-06-16 PROBLEM — R10.13 EPIGASTRIC PAIN: Status: ACTIVE | Noted: 2019-08-15

## 2021-06-16 PROBLEM — K21.9 GERD (GASTROESOPHAGEAL REFLUX DISEASE): Status: ACTIVE | Noted: 2018-09-21

## 2021-06-16 PROBLEM — Z79.899 POLYPHARMACY: Status: ACTIVE | Noted: 2019-08-15

## 2021-06-16 PROBLEM — K59.01 SLOW TRANSIT CONSTIPATION: Status: ACTIVE | Noted: 2019-08-15

## 2021-06-16 PROBLEM — I16.0 HYPERTENSIVE URGENCY: Status: ACTIVE | Noted: 2019-08-19

## 2021-06-16 NOTE — PROGRESS NOTES
Clinic Care Coordination Contact    Community Health Worker Follow Up    Goals:   Goals Addressed                 This Visit's Progress       Patient Stated      Medical (pt-stated)   60%     Goal Statement: I would like to be evaluated to get on active transplant list the next 90 days.     Date Goal set: 12/17/2020  Barriers: language barrier, lack of knowledge, lack of support  Strengths: Agrees to answer the phone, agrees to work on goals   Date to Achieve By: 3/17/2020  Patient expressed understanding of goal: Yes    Action steps to achieve this goal:  1. I will speak with CCC RN at outreach calls.   2. I will take my meds as prescribed and set up by home care nurse.   3. I will answer my phone or return calls in a timely manner.   4. I will call CCC RN with any concerns or questions.   5. I will continue to work on my goals with ESPINOZA garcia - Kiah.   6. Need to clarify if transplant team okay with patient getting on the lease of housing per Kiah.               CHW Outreach Conversation:    Digna reports moving to a new place of his own but did not update anyone about this right away. CHW did confirm new address and it is now in the Epic system.    Digna is working with the Transplant Team providers on multiple things so that he can get onto their Transplant waiting list. Digna reports using his social security back pay money to pay for his current apartment. This money will eventually run out and he will have only his monthly RSDI income. The concern is that he would benefit from low-income affordable housing but due to have a liquid asset like social security back pay money he may not qualify for low-income housing waiting lists at this time. [facility] radha Dupont will be the ongoing support working with Digna on any housing concerns.    ESPINOZA Garcia is already communicating with the Transplant SW regarding Digna's housing.    Per Transplant SW Princess Jalloh, Digna will likely need to meet with the  Transplant Psychologist to assess his mental health but that no neuropsych evaluation is needed.        CHW Next Follow-up: 1 month    CHW Plan: Route this note to CCC RN to confirm how she would like to move forward at this time as Transplant SW and ARM worker are the two primary individuals managing Digna's concerns related to getting onto the Transplant waiting list, i.e. housing.

## 2021-06-16 NOTE — PROGRESS NOTES
Clinic Care Coordination Contact    - CCC RN received a call back from NADEGE Tripp with [facility] transplant team.     - [facility] RN updated Kiah today with 3 previous goals that completed.   - Per Kiah, she spoke with Kiah garcia recently and was told that patient needs to work on affordable housing first.     1) depression - [facility] RN was told by Kiah garcia last week that patient needs to be seen by a psychiatrist or have a neuro-psych completed.   - [facility] RN explained to NADEGE Zavala today that patient was seen by PCP re: depression and started on Zoloff 50mg daily and since then patient has been doing well.   - NADEGE Dupont today, she will discuss this during the committee meeting and patient may seen by a psychologist at their clinic for 1 time. No need to see a psychiatrist at this time.     2) Affordable housing.   - Patient will continue to work with Kiah garcia to assist with affordable housing.     3) Care partner - questionable status  - Per NADEGE Dupont today, she will bring this up during committee meeting if have Kiah garcia and PCA as patient's care partners would be acceptable.

## 2021-06-16 NOTE — TELEPHONE ENCOUNTER
Telephone Encounter by Amira Gutiérrez at 12/3/2019  8:07 AM     Author: Amira Gutiérrez Service: -- Author Type: --    Filed: 12/3/2019  8:08 AM Encounter Date: 11/27/2019 Status: Signed    : Amira Gutiérrez       PRIOR AUTHORIZATION DENIED    Denial Rational: Patient is able to get up to 1 capsule per day for a maximum of 120 days within 365 days.  This is the duration set by patient's plan for PPIs.  This limit is shared across all PPIs.             Appeal Information: If provider would like to appeal please provide a letter of medical necessity and route back to the PA team.

## 2021-06-16 NOTE — PROGRESS NOTES
Clinic Care Coordination Contact    Follow Up Progress Note      Assessment: follow up on active transplant list  - Chart review completed today.   - [facility] RN left a voicemail for Margaret Mirza RN today and emailed NADEGE Tripp with [facility] transplant clinic today, pending respond back.       1) Transplant evaluation  - CCC RN left a voicemail for Margaret Schuler RN - [phone]- today - Transplant Coordinator w/ [facility] - see letter in Media tab from [place] [facility]     - Patient last completed evaluation with transplant clinic on 2020.   Transplant clinic would like patient to address 3 goals post evaluation before placed on active list:     1) To re-establish care with [employer] worker  - Patient was establisehed ARM worker - Kiah     2) To address depression  - Started on Zoloff for depression by PCP since then patient has been doing well.     3) To identify a care partner   - Patient has a PCA now and PCA agrees to be his care partner post transplant.     Letty Dupont,   I am following up on Digna Gutierrez ( : 1985). We last spoke last year in August to get patient on active transplant list.   Patient had eval completed last year and was told to work on 3 goals below:  1) [employer] worker - he s established with Kiah through [employer]   2) Depression - he was started on Zoloff 50mg daily. He has been doing really well since started on zoloff.   3) Care partner - He has a PCA now and she agrees to be his care partner. Kiah also would like to know if she could be listed as his care partner.     Could you please review his case and let me know if he could have another evaluation done.   Thank you,  Lyudmila Glover, RN  [phone]      Goals addressed this encounter:   Goals Addressed                 This Visit's Progress       Patient Stated      Medical (pt-stated)   40%     Goal Statement: I would like to be evaluated to get on active transplant list the next 90 days.     Date Goal set:  12/17/2020  Barriers: language barrier, lack of knowledge, lack of support  Strengths: Agrees to answer the phone, agrees to work on goals   Date to Achieve By: 3/17/2020  Patient expressed understanding of goal: Yes    Action steps to achieve this goal:  1. I will speak with CCC RN at outreach calls.   2. I will take my meds as prescribed and set up by home care nurse.   3. I will answer my phone or return calls in a timely manner.   4. I will call CCC RN with any concerns or questions.   5. I will continue to work on my goals with [facility] worker - Kiah.   6. Need to clarify if transplant team okay with patient getting on the lease of housing per Kiah.         COMPLETED: Other (pt-stated)   100%     Goal Statement: I will attend my colonoscopy procedure the ext 60 days.   Date Goal set: 2/25/2021  Barriers: language barrier, lack of knowledge, lack of support  Strengths: Agrees to answer the phone, agrees to work on goals   Date to Achieve By: 4/25/2021  Patient expressed understanding of goal: Yes    Action steps to achieve this goal:  1. I will attend my colonoscopy appt on 3/11/2021 -K+ lab at 8:30am and procedure starts at 10:45am.   2. Patient will  colonoscopy prep at [facility] on [address] 2-3 days prior to the procedure.  3. I will call CCC RN or CHW with any concerns or questions.     Transportation: PCA bringing patient to [facility] appointment    [facility] -  [phone] - [address], [address], [place] [address]             Plan:   1) CCC RN will follow up in 6 weeks on sooner pending respond back from [facility] transplant team - Margaret Mirza RN or BECKY Tripp

## 2021-06-17 NOTE — PROGRESS NOTES
Clinic Care Coordination Contact    Follow Up Progress Note      Assessment: follow up post diagnostic interview - psychological evaluation for transplant renee ( recipient)    - Reviewed visit notes dated on 5/4/2021 completed by Eloise Maldonado, PHD, LP.   - recommended to move forward with the transplant process.       SUMMARY AND ASSESSMENT / RECOMMENDATIONS:  Mr. Gutierrez is a 35-year-old Malagasy-speaking male who was referred by the [facility]plant [facility] for a behavioral-health evaluation.    In addition to a nurse who helps set up his medications, Mr. Gutierrez has PCA assistance 4.5 hours per day for help with cooking and cleaning. He also has friends who help him out at times, and chart records indicate Mr. Gutierrez additionally has the assistance of an [employer] worker 6 hours/ week. Chart records suggest that his [employer] worker and PCA will help him with his surgery recovery. Today Mr. Gutierrez told me that he knows a woman who is like an aunt, and she will also help him with cooking and cleaning if he needs assistance.     Mr. Gutierrez has no concerns about his ability to care for a graft post-transplant, and he expresses strong motivation to proceed with the transplant. Mr. Gutierrez is motivated to obtain a kidney as he wants better health and would like to return to working and to eventually have a family of his own     Today Mr. Gutierrez denied any concerning mental-health symptoms. He acknowledged a past, difficult time when he did feel depressed, but he is not currently feeling depressed.     I have no concerns regarding Mr. Gutierrez moving forward with the transplant process.     Eloise Maldonado, Ph.D., L.P.  Senior Clinical Psychologist  Primary Care Behavioral Health  [facility], Internal Medicine    - Per chart review, NADEGE Tripp with transplant team is following on patient's progress.     Kiah Jalloh, [facility] - 04/30/2021 9:55 AM CDT  Formatting of this note might be different from the original.  D/A: Received call from  [facility] worker Kiah Verduzco who had pt and RN Care Coordinator Lyudmila on speaker phone. Goals reviewed as follows:    1. Obtain [facility] worker - done. May is approved to provide 6 hours per week. She helps pt with insurance renewal paperwork and future planning.    2. Get a psychiatric assessment. Pt's PCP did not believe pt had significant mental health issues requiring referral to psychiatry however May reports pt has struggled with depression and she is presently working with him on anger management issues. Pt has an appointment to meet with psychologist Dr Eloise Maldonado 5/4/21.    3. Have stable housing. Pt now resides at [address] [place] [address]. He received back pay from Social Security and with that money paid his rent for the next 18 months. The plan is for pt to transition to [address] after 18 months. [address] housing will be more affordable for pt. May will arrange this for pt. Pt confirms with writer that he agrees with this plan.    4. Have care partners. Pt has no friends or family to assist him but his [facility] worker May agrees to be his care partner and attend some appointments with pt and pt has 4.5 hours daily PCA. PCA does drive and can attend appointments with pt.     5. Pt is on Medicare A&B and Blue Plus Medical Assistance. He has no spend down. May doesn't know why pt isn't on QMB but says she will work with pt to get on the state buy in program.    Informed pt, May and Lyudmila the next step is for pt to meet with Dr. Maldonado. Stressed the importance of not missing this appointment. After her report is reviewed pt will be contacted for next steps.    R: Pt aware of how to reach writer.   P: will follow PRN.  May Thompsoning # [phone]      - CCC RN left a message for Kiah garcia today to call [facility] RN back to discuss on next step planning.     Goals addressed this encounter:   Goals Addressed                 This Visit's Progress       Patient Stated       Medical (pt-stated)   30%     Goal Statement: I would like to be evaluated to get on active transplant list the next 90 days.     Date Goal set: 5/4/2021  Barriers: language barrier, lack of knowledge, lack of support  Strengths: Agrees to answer the phone, agrees to work on goals   Date to Achieve By: 8/4/2021  Patient expressed understanding of goal: Yes    Action steps to achieve this goal:  1. I will speak with CCC RN at outreach calls.   2. I will take my meds as prescribed and set up by home care nurse.   3. I will call CCC RN with any concerns or questions.   4. I will continue to work on my goals with [facility] worker Claire Dupont.   5. I will attend my follow up appointment at [facility] on 5/4/2021 at 10am with Eloise Maldonado, PhD, LP. ( completed)             Outreach Frequency: 6 weeks    Plan:   1) CCC RN will follow up in 6 weeks or sooner pending a call back from [facility] worker.

## 2021-06-17 NOTE — PROGRESS NOTES
Clinic Care Coordination Contact    Follow Up Progress Note      Assessment: follow up on goal  - Chart review completed today.   - spoke with patient briefly today. He's already at the clinic to attend his 10am appt with Eloise Maldonado, PhD, LP.   - CCC RN will review Eloise Maldonado, PhD, LP. Notes for any recommendations.     - CCC RN received a call from Kiah - ESPINOZA garcia last week Friday and per Kiah, she thinks that transplant SW or team is making it harder for patient to get on active list.   - Kiah doesn't want patient to be graduated from [facility]] because it would be another barrier for patient if he did which CCC RN agrees.     Goals addressed this encounter:   Goals Addressed                 This Visit's Progress       Patient Stated      Medical (pt-stated)   20%     Goal Statement: I would like to be evaluated to get on active transplant list the next 90 days.     Date Goal set: 5/4/2021  Barriers: language barrier, lack of knowledge, lack of support  Strengths: Agrees to answer the phone, agrees to work on goals   Date to Achieve By: 8/4/2021  Patient expressed understanding of goal: Yes    Action steps to achieve this goal:  1. I will speak with CCC RN at outreach calls.   2. I will take my meds as prescribed and set up by home care nurse.   3. I will call CCC RN with any concerns or questions.   4. I will continue to work on my goals with [employer] worker - Kiah.   5. I will attend my follow up appointment at [facility] on 5/4/2021 at 10am with Eloise Maldonado, PhD, LP.              Outreach Frequency: 6 weeks    Plan:   1) CCC RN will review notes from today appt in 6 weeks or sooner if needed.

## 2021-06-17 NOTE — PROGRESS NOTES
Clinic Care Coordination Contact    Community Health Worker Follow Up    Goals:     Goals Addressed                 This Visit's Progress       Patient Stated      Medical (pt-stated)   80%     Goal Statement: I would like to be evaluated to get on active transplant list the next 90 days.     Date Goal set: 12/17/2020  Barriers: language barrier, lack of knowledge, lack of support  Strengths: Agrees to answer the phone, agrees to work on goals   Date to Achieve By: 3/17/2020  Patient expressed understanding of goal: Yes    Action steps to achieve this goal:  1. I will speak with CCC RN at outreach calls.   2. I will take my meds as prescribed and set up by home care nurse.   3. I will call CCC RN with any concerns or questions.   4. I will continue to work on my goals with [facility] worker - Kiah.   5. I will attend my follow up appointment at [facility] on 5/4/2021 with Eloise Maldonado, PhD, LP.               CHW Outreach Conversation:    Digna reports he continues to work with his [employer] worker and has been following up on his appointments. He reports that he does remember he has an appointment on 5/4/2021 at [facility] as he continues to focus on being placed on the transplant list.    The Transplant Team continues to monitor his health insurance eligibility.    The Transplant SW is in contact with the [employer] worker for updates.    Digna reports he will speak with the CCC RN next week when she calls him.      CHW Next Follow-up: 1 month    CHW Plan: Review chart and outreach to the patient.

## 2021-06-17 NOTE — TELEPHONE ENCOUNTER
Hi Dr. Zarate,    I saw Digna Gutierrez for an OASIS re-cert. He is not able to manage the medications and would like to continue home care services for [facility] for complex medication management and general health assessment 1x/EOW x10 weeks with 3 PRN SNV    Please respond with orders.    Thank you,    Yamila Gallego RN  ACF

## 2021-06-18 NOTE — PROGRESS NOTES
ASSESMENT AND PLAN:  Diagnoses and all orders for this visit:    Abdominal pain  Improved with omeprazole.  He will continue omeprazole daily for 4 months of total treatment and follow-up if he has any recurrence.  He also was able to drop off a stool sample today and we are checking for ova and parasites and H. pylori.  We reviewed his urine and blood test results from last visit.  Negative for urinary tract infection.  His urine did show glucosuria but his A1c is well within the normal range, no diabetes.    Neoplasm of uncertain behavior of skin of face  Reviewed the risks and benefits of proceeding with a shave biopsy of the lesion of concern on his face.  He would like to proceed.  Skin was cleaned with Betadine, using sterile equipment and sterile technique the lesion was removed with a shave and placed in a labeled pathology chart and sent to lab.  The resulting skin defect was then treated with chemical cautery and bandaged with antibiotic ointment and gauze and aftercare instructions counseling done today with the patient.  No complications at the time of procedure, estimated blood loss of less than 1 mL.  Will call the patient when his pathology results are available.  -     Surgical Pathology Exam          SUBJECTIVE: 32-year-old male comes in today for follow-up.  Since starting on omeprazole and taking it every day his abdominal pain has resolved.  He would like to have the skin lesion on his cheek removed.  It has been increasing in size over the last 6 months.  Patient is noted to have elevated blood pressures today.  He is a dialysis patient and has a regular nephrologist, he was counseled on the importance of following up at his next dialysis run and letting the nurses know about his elevated blood pressure so that they can pass the information along to his nephrologist to see if the medication change needs to be made.  We also reviewed his lab results as detailed above, he does have a mild  "elevation in his LDL cholesterol and was counseled on dietary and lifestyle changes.    No past medical history on file.  Patient Active Problem List   Diagnosis     ESRD (end stage renal disease) on dialysis     Current Outpatient Prescriptions   Medication Sig Dispense Refill     lisinopril (PRINIVIL,ZESTRIL) 20 MG tablet Take 20 mg by mouth daily.  11     omeprazole (PRILOSEC) 20 MG capsule Take 1 capsule (20 mg total) by mouth daily. 30 capsule 3     terazosin (HYTRIN) 2 MG capsule Take 2 mg by mouth daily.  10     No current facility-administered medications for this visit.      History   Smoking Status     Never Smoker   Smokeless Tobacco     Never Used       OBJECTICE: BP (!) 150/100 (Patient Site: Right Arm, Patient Position: Sitting, Cuff Size: Adult Regular)  Pulse 91  Temp 99  F (37.2  C) (Oral)   Resp 16  Ht 5' 2.99\" (1.6 m)  Wt 147 lb 8 oz (66.9 kg)  SpO2 96%  BMI 26.14 kg/m2     No results found for this or any previous visit (from the past 24 hour(s)).     GEN-alert, appropriate, in no apparent distress   CV-regular rate and rhythm   RESP-lungs clear to auscultation   ABDOMINAL-soft and nontender   SKIN-raised verrucous lesion of the cheek about 1 cm in diameter without induration or bleeding or ulceration.      Montrell Zarate          "

## 2021-06-18 NOTE — PROGRESS NOTES
ASSESMENT AND PLAN:  Diagnoses and all orders for this visit:    Abdominal pain  Reviewed differential diagnosis with the patient.  Lab workup as ordered below.  Will start omeprazole for presumed gastritis or even ulcer disease, we discussed indications for routine and emergent follow-up.  He will be following up in clinic next week for a recheck and skin procedure as detailed below.  -     Urinalysis-UC if Indicated  -     Culture, Urine  -     HM2(CBC w/o Differential)  -     H. pylori Antigen, Stool(HPSAG)  -     Ova and Parasite, Stool  -     omeprazole (PRILOSEC) 20 MG capsule; Take 1 capsule (20 mg total) by mouth daily.  Dispense: 30 capsule; Refill: 3    ESRD (end stage renal disease) on dialysis  Patient will continue with his 3 times weekly dialysis.  We are going to need to get out of state records to determine why he has kidney failure.  -     LDL Cholesterol, Direct    Neoplasm of uncertain behavior of skin  Discussed options with the patient, he would like to proceed with shave biopsy removal of the lesion which will be done here in the clinic next week.  We reviewed the risks and benefits of the procedure including scar formation.    Glucosuria  -     Glycosylated Hemoglobin A1c done today shows he does not have diabetes.  -     Thyroid Cascade          SUBJECTIVE: 32-year-old male new patient to the clinic.  Previously had been living in [place].  He moved to [place] last year and has been getting dialysis 3 times per week at a local dialysis clinic, his nephrology and dialysis care was transferred from [place] to here but the patient is not clear on what the underlying cause of his kidney failure was.  This is his first time at a primary care clinic since being in [place].  His main concern is abdominal pain.  Patient reports that he gets abdominal pain that is sharp and moderate in severity intermittently over the last 3 months.  The pain is sometimes in the left lower quadrant, sometimes in the  "right lower quadrant, and sometimes in the upper abdomen in the middle.  He cannot think of any aggravating or alleviating factors.  The pain lasts for minutes to hours and then resolves.  Patient also reports that sometimes he has an upper back pain but that the 2 symptoms, abdominal pain and back pain, do not usually come together.  Patient is a refugee from [facility].    Review of systems: No chest pain, no shortness of breath, he is noted to have a skin lesion of the face which he reports has increased in size over the last 6 months.  No blood in the urine or blood in the stool or black tarry stools.  He does occasionally get dark colored stools.  Remainder review of systems is as above are negative.    No past medical history on file.  Patient Active Problem List   Diagnosis     ESRD (end stage renal disease) on dialysis     Current Outpatient Prescriptions   Medication Sig Dispense Refill     lisinopril (PRINIVIL,ZESTRIL) 20 MG tablet Take 20 mg by mouth daily.  11     terazosin (HYTRIN) 2 MG capsule Take 2 mg by mouth daily.  10     omeprazole (PRILOSEC) 20 MG capsule Take 1 capsule (20 mg total) by mouth daily. 30 capsule 3     No current facility-administered medications for this visit.      History   Smoking Status     Never Smoker   Smokeless Tobacco     Never Used       OBJECTICE: BP (!) 140/94  Pulse 91  Temp 98.1  F (36.7  C) (Oral)   Resp 12  Ht 5' 2.99\" (1.6 m)  Wt 147 lb (66.7 kg)  SpO2 96%  BMI 26.05 kg/m2     Recent Results (from the past 24 hour(s))   Urinalysis-UC if Indicated    Collection Time: 05/17/18  9:06 AM   Result Value Ref Range    Color, UA Yellow Colorless, Yellow, Straw, Light Yellow    Clarity, UA Clear Clear    Glucose,  mg/dL (!) Negative    Bilirubin, UA Negative Negative    Ketones, UA Negative Negative    Specific Gravity, UA 1.010 1.005 - 1.030    Blood, UA Moderate (!) Negative    pH, UA >=9.0 (H) 5.0 - 8.0    Protein,  mg/dL (!) Negative mg/dL    " Urobilinogen, UA 0.2 E.U./dL 0.2 E.U./dL, 1.0 E.U./dL    Nitrite, UA Negative Negative    Leukocytes, UA Trace (!) Negative   Glycosylated Hemoglobin A1c    Collection Time: 05/17/18  9:46 AM   Result Value Ref Range    Hemoglobin A1c 4.5 3.5 - 6.0 %   HM2(CBC w/o Differential)    Collection Time: 05/17/18  9:46 AM   Result Value Ref Range    WBC 4.8 4.0 - 11.0 thou/uL    RBC 4.46 4.40 - 6.20 mill/uL    Hemoglobin 13.9 (L) 14.0 - 18.0 g/dL    Hematocrit 42.0 40.0 - 54.0 %    MCV 94 80 - 100 fL    MCH 31.1 27.0 - 34.0 pg    MCHC 33.0 32.0 - 36.0 g/dL    RDW 14.6 (H) 11.0 - 14.5 %    Platelets 218 140 - 440 thou/uL    MPV 7.0 7.0 - 10.0 fL        GEN-alert, appropriate, in no apparent distress   HEENT-mucous membranes are moist, sclera are clear, neck is supple with no palpable mass   CV-regular rate and rhythm with no murmur   RESP-lungs clear to auscultation   ABDOMINAL-soft, diffuse tenderness to deep palpation, no palpable masses organomegaly   EXTREM-warm with no edema   SKIN-healthy-appearing dialysis fistula access of the arm.  There is a raised verrucous appearing large skin lesion of the face.   Genitourinary-normal testicular exam bilaterally, no hernia.  Normal appearance to the external genitalia.      Montrell Zarate

## 2021-06-19 NOTE — H&P
[facility] Radiology History and Physical Note  Date/Time: 7/12/2018/8:53 AM    Procedure Requested: Fistulogram  Requesting Provider: Dr. Banks    HPI: Digna Gutierrez is a 32 y.o. male with history of solitary kidney, renal transplant and now HD dependent ESRD via LUE fistula (placed in [place] within the last year per patient). Presents today for declot of LUE fistula. Patient had fistula declotted last in [place] 10/9/17. He dialyzes M/W/F. He was able to dialyze completely on 7/9 but was unable to dialyze on 7/11 due to clotted access. He denies pain and swelling in the LUE, shortness of breath. He plans to dialyze tomorrow at [facility] [employer] [place] as normal.     IMAGING: Reviewed de-clot procedure report from 10/9/17    NPO Status: [place]  Anticoagulation/Antiplatelets/Bleeding tendencies: None  Antibiotics: None    REVIEW OF SYMPTOMS: Denies recent fevers, chills, night sweats, abdominal pain, N/V/D.    PAST MEDICAL HISTORY:   Past Medical History:   Diagnosis Date     Chronic kidney disease      ESRD (end stage renal disease) (H)      Solitary kidney      Patient Active Problem List   Diagnosis     ESRD (end stage renal disease) on dialysis (H)     PAST SURGICAL HISTORY:   No past surgical history on file.    ALLERGIES:  Review of patient's allergies indicates no known allergies.    MEDICATIONS:  Current Outpatient Prescriptions   Medication Sig Dispense Refill     lisinopril (PRINIVIL,ZESTRIL) 20 MG tablet Take 20 mg by mouth daily.  11     omeprazole (PRILOSEC) 20 MG capsule Take 1 capsule (20 mg total) by mouth daily. 30 capsule 3     terazosin (HYTRIN) 2 MG capsule Take 2 mg by mouth daily.  10     Current Facility-Administered Medications   Medication Dose Route Frequency Provider Last Rate Last Dose     alteplase injection 4 mg (CATHFLO; TPA)  4 mg Intravesicular Once Allen Macario MD         lidocaine 10 mg/mL (1 %) injection 0.1-0.3 mL  0.1-0.3 mL Subcutaneous PRN Daphne Chu CNP         sodium chloride flush 3 mL  "(NS)  3 mL Intravenous Line Care Daphne Chu CNP           LABS:    Hemoglobin (g/dL)   Date Value   05/17/2018 13.9 (L)     Platelets (thou/uL)   Date Value   05/17/2018 218     Potassium (mmol/L)   Date Value   07/12/2018 5.9 (H)     EXAM:  /66  Pulse 70  Temp 98.5  F (36.9  C) (Oral)   Resp 16  Ht 5' 2\" (1.575 m)  Wt 147 lb (66.7 kg)  SpO2 100%  BMI 26.89 kg/m2  General: Stable. In no acute distress.  Neuro: A&O x 3.  Resp: Lungs clear to auscultation bilaterally.  Cardio: S1S2 and reg, without murmur, clicks or rubs  Vascular: LUE fistula without bruit or thrill, site marked. No edema     Pre-Sedation Assessment:  Mallampati Airway Classification: Class 2: upper half of tonsil fossa visible  Previous reaction to anesthesia/sedation: no  Sedation plan based on assessment: Moderate  Sleep Apnea: no  Dentures: no  COPD: no  ASA Classification: ASA 3 - Patient with moderate systemic disease with functional limitations    ASSESSMENT: 32 year old male with HD dependent ESRD with clotted LUE fistula    PLAN: Left upper extremity fistulogram with possible stent, angioplasty, lysis with possible placement of dialysis catheter with sedation     The procedure, risks and moderate sedation were discussed with the patient, all questions answered and patient agrees to proceed with the procedure. Written consent obtained via [employer]  via [employer].     Daphne Chu CNP    [facility]: Interventional Radiology   [phone]    "

## 2021-06-19 NOTE — PROCEDURES
INTERVENTIONAL RADIOLOGY PROCEDURE NOTE    Patient Name: Digna Gutierrez  Medical Record Number: [id number]  YOB: 1985    Date/Time: 7/12/2018 11:34 AM    Procedure: Left wrist Steven Declot     Diagnosis: Renal Failure    Medications: Versed 5mg IV and Fentanyl 200mcg IV    Contrast: Visipaque 320, 70mL    Fluoroscopy Time: 13.9 min    EBL: None    Complications: None    Specimens Sent: None    Findings: Successful left wrist steven declot    Plan: Post angiographic monitoring    Allen Macario

## 2021-06-19 NOTE — LETTER
Letter by Charlee Breen PA-C at      Author: Charlee Breen PA-C Service: -- Author Type: --    Filed:  Encounter Date: 10/28/2019 Status: Signed         October 28, 2019     Patient: Digna Gutierrez   YOB: 1985   Date of Visit: 10/28/2019       To Whom It May Concern:    It is my medical opinion that Digna Gutierrez will not be able to work for more than 45 days due to his medical condition.    If you have any questions or concerns, please don't hesitate to call.    Sincerely,        Electronically signed by Charlee Breen PA-C

## 2021-06-20 NOTE — PROGRESS NOTES
ASSESMENT AND PLAN:  Diagnoses and all orders for this visit:    1. ESRD (end stage renal disease) on dialysis (H)  -  Reviewed labs. Pt is currently on dialysis and awaiting kidney transplant.  -  Pt was told he had something in his lungs; no notes indicating lung mass/nodules.  Notes mentions about tender Right neck mass (see #2 below).  -  Pt is currently seeing Nephrology; awaiting kidney transplant; continue as planned.    Ordered:  -     Basic Metabolic Panel    2. Neck mass concerns  -  Notes from 7/31 indicating tender right neck mass; r/o  Lymph node vs. Submandibular gland.  Pt denies any mass or tenderness in neck.   Exam is negative today.   -  F/u as needed.     3. Epigastric pain  -  Mostly controlled. Denies any new or worsening sxs.   Refill:  -     omeprazole (PRILOSEC) 20 MG capsule; Take 1 capsule (20 mg total) by mouth daily.  Dispense: 30 capsule; Refill: 3    4. Immunization due  -  Prevnar 13 and  Tdap due per [facility].  -  Discussed risks and benefits.   -  Pt states he had flu shot few days ago; confirmed by [place].   Ordered:  -     Tdap vaccine greater than or equal to 6yo IM        -     Pneumococcal conjugate vaccine 13-valent 6wks-17yrs; >50yrs    5. Bloating  -  Occasional bloating.   -  Follow up if symptoms not better or worsens.    Ordered:  -     simethicone (GAS-X) 80 MG chewable tablet; Chew 1 tablet (80 mg total) 4 (four) times a day as needed for flatulence.  Dispense: 100 tablet; Refill: 2    Patient is present with a Professional , Wilmer Caruso.  Reviewed Medical/Social history and Medications.  See new changes above.   Discussed indications for emergent medical attention and routine F/u.  Patient/Parent/Guardian engaged in decision making process and verbalized understanding of the options discussed and agreed with the final treatment plan.     SUBJECTIVE:  Digna Gutierrez is a 32 y.o. Male who presents for vaccinations.    Note from [facility] stating  "Pt will need Tdap and Prevnar.    Pt states he was told he had lung nodules but I was not able to find any notes indicating anything pertaining to lung mass.  Only note, 7/31,  is neck mass which may represent lymph node vs. Submandibular mass.  Pt states he doesn't have any neck mass and knows nothing about it.  Exam today is negative for any neck mass or lymphadenopathy.  Denies neck pain, oral mass/tenderness, fever/chills, wheezing, SOB, CP, n/v, abdominal pain, diarrhea/constipation, hematochezia, hematemesis.     ROS:  Comprehensive Review of Systems Negative except stated in HPI.     Past Medical History:   Diagnosis Date     Chronic kidney disease      ESRD (end stage renal disease) (H)      Solitary kidney      Patient Active Problem List   Diagnosis     ESRD (end stage renal disease) on dialysis (H)     GERD (gastroesophageal reflux disease)     Current Outpatient Prescriptions   Medication Sig Dispense Refill     lisinopril (PRINIVIL,ZESTRIL) 20 MG tablet Take 20 mg by mouth daily.  11     omeprazole (PRILOSEC) 20 MG capsule Take 1 capsule (20 mg total) by mouth daily. 30 capsule 3     terazosin (HYTRIN) 2 MG capsule Take 2 mg by mouth daily.  10     simethicone (GAS-X) 80 MG chewable tablet Chew 1 tablet (80 mg total) 4 (four) times a day as needed for flatulence. 100 tablet 2     No current facility-administered medications for this visit.      History   Smoking Status     Never Smoker   Smokeless Tobacco     Never Used     OBJECTIVE: /72  Pulse 72  Temp 98.2  F (36.8  C) (Oral)   Resp 20  Ht 5' 2\" (1.575 m)  Wt 152 lb 8 oz (69.2 kg)  SpO2 97%  BMI 27.89 kg/m2   No results found for this or any previous visit (from the past 24 hour(s)).    PHYSICAL:  General Alert, awake, not in acute distress.   HEENT:             -Head Atraumatic, normocephalic.            -Eyes PERRL, no erythema, discharge, conjunctiva clear.             -Ears TMs intact, no drainage, no erythema or edema.            " -Nose    Nostrils patent,  no edema.            -Throat Oropharynx without edema, erythema.  Uvula midline, no deviation.             -Neck Neck FROM, no adenopathy.  Thyroid not visibly enlarged.   CV Normal S1 & S2. No murmurs.   RESP Non-labored, RRR, CTAB. No wheezes or crackles.    ABDOMEN Epigastric tenderness. Soft. No rigidity, guarding.  Normal bowel sounds.    EXTREMITY No edema, erythema, deformity. FROM.  Pulses present. Normal capillary refill.        Charlee Breen PA-C

## 2021-06-20 NOTE — LETTER
Letter by Lyudmila Glover RN at      Author: Lyudmila Glover RN Service: -- Author Type: --    Filed:  Encounter Date: 7/6/2020 Status: (Other)       Care Plan  About Me:    Patient Name:  Digna Gutierrez    YOB: 1985  Age:         34 y.o.   [facility] MRN:    [id number] Telephone Information:  Home Phone [phone]   Mobile [phone]       Address:  [address] [place] [address] Email address:  No e-mail address on record      Emergency Contact(s)  Extended Emergency Contact Information      Name: Saroj Renteria    Relation: Other      Name: Aurea Gutierrez    Relation: Niece          Primary language:  Swazi     needed? Yes   [employer]:  [phone] op. 1  Other communication barriers: English as a second language, Language barrier  Preferred Method of Communication:     Current living arrangement: I live in a private home with family  Mobility Status/ Medical Equipment: Independent    Health Maintenance  Health Maintenance Reviewed:      My Access Plan  Medical Emergency 911   Primary Clinic Line Montrell Zarate MD - [phone]   24 Hour Appointment Line [phone] or  [phone]) (toll-free)   24 Hour Nurse Line [phone] (toll-free)   Preferred Urgent Care [facility], [phone]   [facility]  [phone]   Preferred Pharmacy [facility] - [place], [place] - [address]     Behavioral Health Crisis Line The National Suicide Prevention Lifeline at 1-221.702.4974 or 911             My Care Team Members  Patient Care Team       Relationship Specialty Notifications Start End    Montrell Zarate MD PCP - General Family Medicine  9/12/18     Phone: [phone] Fax: [phone]         [address] [place] [address]    Malina Trevizo CHW Community Health Worker Primary Care - CC Admissions 5/14/19     PH: [phone]  Fax: [phone]    Phone: [phone] Fax:  [phone]        Lyudmila Glover, RN Lead Care Coordinator Primary Care - CC Admissions 7/11/19     Fax: [phone]         Liane Sierra MD Assigned PCP   7/28/19     Phone: [phone] Fax: [phone]         [address] [place] [address]    Neva Broussard BSTIMOTHY Clinic Care Coordinator Primary Care - CC Admissions 8/9/19     Fax: [phone]         ESPINOZA [place] Worker    10/1/19     [facility]    Phone: [phone]         Viviana Mayberry, ESTELA case manager     12/27/19     [facility] - you may also reach CM via in basket. Visit nurse- Yakelin Jean - [place] - [phone]    Phone: [phone]         Margaret Mirza RN - Transplant coordinator     12/27/19     [facility] - see letter from [place] transplant \"media tab\" dated on 12/9/19    Phone: [phone]                 My Care Plans  Self Management and Treatment Plan  Goals and (Comments)  Goals        General    RN Goal: I would like [facility] RN to coordinate care with [facility] re: kidney transplant in the next 6 months.  (pt-stated)     Notes - Note edited  4/20/2020  2:46 PM by Lyudmila Glover, RN    Action steps to achieve this goal  1.  I will speak with CCC RN at outreach calls.  2.  CCC  RN will reach out to [facility] kidney transplant team to find out if patient is on the list - [facility] RN left a message for NADEGE Tripp with [facility] at [phone], pending. ( On hold due to covid-19)     Goal on hold until June, 20 due to inactive list with transplant list   Date goal set:  9/10/19  Discussed/updated: 11/5/19; 12/27/19; 3/31/2020; 4/20/2020               Advance Care Plans/Directives Type:        My Medical and Care Information  Problem List   Patient Active Problem List   Diagnosis   ? ESRD (end stage renal disease) on dialysis (H)   ? GERD (gastroesophageal reflux disease)   ? Polypharmacy   ? Slow transit constipation   ? Language barrier affecting health care   ? " Cough with hemoptysis   ? Epigastric pain   ? Essential hypertension   ? Hypertensive urgency   ? ESRD (end stage renal disease) (H)   ? LVH (left ventricular hypertrophy)      Current Medications and Allergies:  See printed Medication Report.    Care Coordination Start Date: 7/6/2020   Frequency of Care Coordination:     Form Last Updated: 07/06/2020

## 2021-06-20 NOTE — LETTER
Letter by Lyudmila Glover RN at      Author: Dah, Lyudmila, RN Service: -- Author Type: --    Filed:  Encounter Date: 7/6/2020 Status: (Other)       CARE COORDINATION  [address], [address]  [place] [address]      July 6, 2020    Digna Gutierrez  [address]  [place] [address]      Dear Digna,    I am a clinic care coordinator  who works with Montrell Zarate MD at [address], [facility] [address]  [place] [address]    . I wanted to thank you for spending the time to talk with me.  Below is a description of clinic care coordination and how I can further assist you.      The clinic care coordination team is made up of a registered nurse,  and community health worker who understand the health care system. The goal of clinic care coordination is to help you manage your health and improve access to the health care system in the most efficient manner. The team can assist you in meeting your health care goals by providing education, coordinating services, strengthening the communication among your providers and supporting you with any resource needs.    Please feel free to contact the Community Health Worker at [phone] with any questions or concerns. We are focused on providing you with the highest-quality healthcare experience possible and that all starts with you.     Sincerely,     Lyudmila Glover RN    Enclosed: I have enclosed a copy of the Care Plan. This has helpful information and goals that we have talked about. Please keep this in an easy to access place to use as needed.

## 2021-06-21 NOTE — LETTER
Letter by Lyudmila Glover RN at      Author: Dah, Lyudmila, RN Service: -- Author Type: --    Filed:  Encounter Date: 3/8/2021 Status: (Other)       Care Plan  About Me:    Patient Name:  Digna Gutierrez    YOB: 1985  Age:         35 y.o.   [facility] MRN:    [id number] Telephone Information:  Home Phone [phone]   Mobile [phone]       Address:  [address] [place] [address] Email address:  No e-mail address on record      Emergency Contact(s)  Extended Emergency Contact Information      Name: Saroj Renteria    Relation: Other      Name: Aurea Gutierrez    Relation: Niece          Primary language:  Lao     needed? Yes   [facility] Language Services:  [phone] op. 1  Other communication barriers: English as a second language, Language barrier, Physical impairment, Lack of coping, Caregiver  Preferred Method of Communication:     Current living arrangement:    Mobility Status/ Medical Equipment:      Health Maintenance  Health Maintenance Reviewed:      My Access Plan  Medical Emergency 911   Primary Clinic Line Montrell Zarate MD - [phone]   24 Hour Appointment Line [phone] or  [phone]) (toll-free)   24 Hour Nurse Line [phone] (toll-free)   Preferred Urgent Care     Preferred Hospital     Preferred Pharmacy [facility] - [place], [place] - [address] [place] [address] [address]ky     Behavioral Health Crisis Line The National Suicide Prevention Lifeline at 1-884.708.1944 or 911             My Care Team Members  Patient Care Team       Relationship Specialty Notifications Start End    Montrell Zarate MD PCP - General Family Medicine  9/12/18     Phone: [phone] Fax: [phone]         [address] [place] [address]    Malina Trevizo CHW Community Health Worker Primary Care - CC Admissions 5/14/19     PH: [phone]  Fax: [phone]    Phone: [phone] Fax: [phone]        Lyudmila Glover, RN Lead Care Coordinator Primary Care -  CC Admissions 7/11/19     Fax: [phone]         Montrell Zarate MD Assigned PCP   8/17/20     Phone: [phone] Fax: [phone]         [address] [place] [address] [address] [place] [address]    Margaret Mirza, RN Registered Nurse Transplant  11/1/20     Transplant Coordinator w/ [facility] - see letter in Media tab from [place] Transplant Team dated 12/9/19    Phone: [phone]         NADEGE Tripp  Transplant  11/1/20     Transplant  w/ [facility] - see letter in Media tab from [place] Transplant Team dated 12/9/19    Phone: [phone]         Kiah Verduzco ARM worker  Behavioral Health  11/12/20     [facility]    Phone: [phone]         Viviana Mayberry RN RN, Care  Health Services  11/13/20     [facility] (SOC = 9/17/19); can also be reached through Epic in-basket messaging    Phone: [phone]         Yakelin Jean LPN  Licensed Practical Nurse Home Health Services  11/13/20     [facility] (SOC = 9/17/19); visit nurse    Phone: [phone]         [facility] Patient Care Assistant   12/4/20     PCA  Agency, approved for 4 hours in November 2020    Phone: [phone] Fax: [phone]                My Care Plans  Self Management and Treatment Plan  Goals and (Comments)  Goals        General    Medical (pt-stated)     Notes - Note edited  1/28/2021  8:32 AM by Malina Trevizo, CHW    Goal Statement: I would like to be evaluated to get on active transplant list the next 90 days.     Date Goal set: 12/17/2020  Barriers: language barrier, lack of knowledge, lack of support  Strengths: Agrees to answer the phone, agrees to work on goals   Date to Achieve By: 3/17/2020  Patient expressed understanding of goal: Yes    Action steps to achieve this goal:  1. I will speak with CCC RN at outreach calls.   2. I will take my meds as prescribed and set up by home care nurse.   3. I will answer my phone  or return calls in a timely manner.   4. I will call CCC RN with any concerns or questions.   5. I will continue to work on my goals with [facility] worker - Kiah.   6. Need to clarify if transplant team okay with patient getting on the lease of housing per Kiah.       Other (pt-stated)     Notes - Note created  2/25/2021  3:08 PM by Lyudmila Glover RN    Goal Statement: I will attend my colonoscopy procedure the ext 60 days.   Date Goal set: 2/25/2021  Barriers: language barrier, lack of knowledge, lack of support  Strengths: Agrees to answer the phone, agrees to work on goals   Date to Achieve By: 4/25/2021  Patient expressed understanding of goal: Yes    Action steps to achieve this goal:  1. I will attend my colonoscopy appt on 3/11/2021 -K+ lab at 8:30am and procedure starts at 10:45am.   2. Patient will  colonoscopy prep at [facility] on [address] 2-3 days prior to the procedure.  3. I will call [facility] RN or CHW with any concerns or questions.     Transportation: PCA bringing patient to [facility] appointment    [facility] -  [phone] - [address], [address], [place] [address]               Advance Care Plans/Directives Type:        My Medical and Care Information  Problem List   Patient Active Problem List   Diagnosis   ? ESRD (end stage renal disease) on dialysis (H)   ? GERD (gastroesophageal reflux disease)   ? Polypharmacy   ? Slow transit constipation   ? Language barrier affecting health care   ? Cough with hemoptysis   ? Epigastric pain   ? Essential hypertension   ? Hypertensive urgency   ? ESRD (end stage renal disease) (H)   ? LVH (left ventricular hypertrophy)   ? Major depressive disorder with single episode, in partial remission (H)      Current Medications and Allergies:  See printed Medication Report.    Care Coordination Start Date: 7/6/2020   Frequency of Care Coordination: 6 weeks   Form Last Updated: 03/08/2021

## 2021-06-21 NOTE — LETTER
Letter by Lyudmila Glover RN at      Author: Dah, Lyudmila, RN Service: -- Author Type: --    Filed:  Encounter Date: 3/8/2021 Status: (Other)       CARE COORDINATION  [address], [address]  [place] [address]  '  March 8, 2021    Digna Gutierrez  [address]  [place] [address]      Dear Digna,    I am a clinic care coordinator who works with Montrell Zarate MD at [address], [facility] [address]  [place] [address]    . I wanted to thank you for spending the time to talk with me.  Below is a description of clinic care coordination and how I can further assist you.      The clinic care coordination team is made up of a registered nurse,  and community health worker who understand the health care system. The goal of clinic care coordination is to help you manage your health and improve access to the health care system in the most efficient manner. The team can assist you in meeting your health care goals by providing education, coordinating services, strengthening the communication among your providers and supporting you with any resource needs.    Please feel free to contact the Community Health Worker at [phone] with any questions or concerns. We are focused on providing you with the highest-quality healthcare experience possible and that all starts with you.     Sincerely,     Lyudmila Glover RN    Enclosed: I have enclosed a copy of the Care Plan. This has helpful information and goals that we have talked about. Please keep this in an easy to access place to use as needed.

## 2021-06-24 ENCOUNTER — COMMUNICATION - HEALTHEAST (OUTPATIENT)
Dept: NURSING | Facility: CLINIC | Age: 36
End: 2021-06-24

## 2021-06-24 ASSESSMENT — ACTIVITIES OF DAILY LIVING (ADL): DEPENDENT_IADLS:: CLEANING;COOKING;LAUNDRY;SHOPPING;MEAL PREPARATION;MEDICATION MANAGEMENT;TRANSPORTATION

## 2021-06-24 NOTE — TELEPHONE ENCOUNTER
Called Dr. Jackson at [facility], Kidney team.  Spoke to his nurse about Pt's elevated BP.  They are not managing his HTN and only his Kidney transplant process.  They are working with Pt to be more compliant with meds so they can move forward.     Nurse gave me Pt's Nephrologist, Dr. Aashish Banks, [phone] and recommended to call his dialysis center, [facility] in [place], [phone], since they would have better idea of Pt's normal BP baseline and current med list.
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
## 2021-06-25 ENCOUNTER — COMMUNICATION - HEALTHEAST (OUTPATIENT)
Dept: NURSING | Facility: CLINIC | Age: 36
End: 2021-06-25

## 2021-06-25 NOTE — PROGRESS NOTES
Clinic Care Coordination Contact    Community Health Worker Follow Up    Goals:     Goals Addressed                 This Visit's Progress       Patient Stated      Financial Wellbeing (pt-stated)   50%     Goal Statement: I will resolve my current outstanding balance with [facility] Billing Department in the next 60 days.    Date Goal set: 5/17/2021  Barriers: language barrier, lack of knowledge  Strengths: Agrees to answer the phone, agrees to work on goals   Date to Achieve By: 7/17/2021  Patient expressed understanding of goal: Yes    Action steps to achieve this goal:  1. I will disregard my current [facility] bill and will continue to work with the CHW on this concern.  2. I will provide any additional information that may be needed to resolve the balance.  3. I will request to be referred to the FRW if I still have a balance in July.    Note: Per [facility] Billing Department, the EOB states it lacks information, but they are working on the claims issues.                  CHW Outreach Conversation:    CHW informed Digna that he can disregard the current bill. Per [facility] Billing Department they are working on the two claims that are outstanding. They report that the EOB states it lacks information but the  could not elaborate further, she only states that they are working on it and that Digna can disregard the bill.        CHW Next Follow-up: 1 month    CHW Plan: Review chart and outreach to the patient.

## 2021-06-27 NOTE — PROGRESS NOTES
Progress Notes by Neva Broussard BSW at 9/4/2019  9:00 AM     Author: Neva Broussard BSW Service: -- Author Type:     Filed: 9/5/2019  9:26 AM Encounter Date: 9/4/2019 Status: Addendum    : Neva Broussard BSW ()    Related Notes: Original Note by Neva Broussard BSW () filed at 9/4/2019 11:51 AM       Clinic Care Coordination Contact    Clinic Care Coordination Contact  OUTREACH    Visit Note:    Present for today's visit is pt,  Saw Mary, CCC RN Lyudmila, and HENRI LIGHT.    Pt was recently hospitalized and started on new medications which he has some questions/confusion about. [facility] NADEGE requested that CCC RN join visit; CCC RN was then able to set up pt's medications and confirm appropriate refills. Scheduled follow up visit with RN was scheduled for 9/10/19 and a home care referral was placed.    Pt is here today to follow up on 's license concerns. HENRI LIGHT has been working with pt for several months to resolve a speeding ticket that he received in 2017. Pt began the visit by signing the required statement verifying that he is pleading guilty to the issued speeding ticket and failure to appear in court following. This was securely emailed directly to [email or web address].     As of today, pt owes [facility] a fee of $202.50 for failing to appear in court regarding this ticket. NADEGE called [facility] (Ph: [phone]) with pt today and confirmed that this fee must be paid via bank card or money order. Pt states that he will get a money order in this amount and bring it to his appointment with CCC RN on 9/10/19. Once received, HENRI LIGHT will mail the money order and required forms/statement to the following address:    [facility] [address]  [address] [place] [address]    NADEGE requested that proof of compliance and payment be sent directly to the clinic. At that time, NADEGE will fax  the forms to the [place] [employer] and have a copy scanned into pt chart for record keeping purposes. Once the [place] [employer] receives them, there will be 21 days allowed for processing. Once processed, pt will be eligible for a 's license again.     Pt was working again, but had to quit at the end of July due to ongoing health problems. Prior to his most recent job, he was receiving [employer] benefits. Wondering what needs to occur to get them reinstated now that he is once again unemployed?    No other questions or concerns identified today. Progress towards goals will continue.    Plan:  1.) Ensure pt gets set up with FRG to discuss reinstating [facility] benefits.  2.) See updated goals.  3.) Continue scheduled outreach.    Referral Information:  Referral Source: PCP    Primary Diagnosis: Psychosocial    Chief Complaint   Patient presents with   ? Clinic Care Coordination - Follow-up   ? Clinic Care Coodination - Face To Face     Clinic Utilization  Difficulty keeping appointments:: No  Compliance Concerns: No  No-Show Concerns: No  No PCP office visit in Past Year: No     Utilization    Last refreshed: 9/4/2019 10:33 AM:  Hospital Admissions 1           Last refreshed: 9/4/2019 10:33 AM:  ED Visits 0           Last refreshed: 9/4/2019 10:33 AM:  No Show Count (past year) 1              Current as of: 9/4/2019 10:33 AM            Clinical Concerns:  Current Medical Concerns:  See Problem List  Current Behavioral Concerns: See Problem List    Pain  Pain (GOAL):: No  Health Maintenance Reviewed: Not assessed  Clinical Pathway: None     Medication Management: CCC RN assisted with medication set up today; referral to home care was placed for ongoing support    Functional Status:  Dependent ADLs:: Independent  Dependent IADLs:: Independent  Bed or wheelchair confined:: No  Mobility Status: Independent  Fallen 2 or more times in the past year?: No  Any fall with injury in the past  year?: No    Living Situation:  Current living arrangement:: I live in a private home with family  Type of residence:: Apartment    Diet/Exercise/Sleep:  Diet:: Regular  Inadequate nutrition (GOAL):: No  Food Insecurity: No  Tube Feeding: No  Exercise:: Currently not exercising  Inadequate activity/exercise (GOAL):: No  Significant changes in sleep pattern (GOAL): No    Transportation:  Transportation concerns (GOAL):: Yes  Transportation means:: Medical transport     Psychosocial:  Yarsanism or spiritual beliefs that impact treatment:: No  Mental health DX:: No  Mental health management concern (GOAL):: No  Informal Support system:: Family     Financial/Insurance:   Financial/Insurance concerns (GOAL):: No     Community Resources: [facility], , Dialysis Services  Supplies used at home:: None  Equipment Currently Used at Home: none    Advance Care Plan/Directive  Advanced Care Plans/Directives on file:: No  Advanced Care Plan/Directive Status: Considering Options    Referrals Placed: Home Care     Goals        Patient Stated    ? Financial Wellbeing (pt-stated)      I would like to meet with clinic FR for assistance re-applying for SNAP benefits, CASH assistance, etc. within the next 30 days    Action steps to achieve this goal  1.  I will answer the phone when [facility] contacts me to schedule an appointment with clinic FRG.  2.  I will return to [place] on [TBD] to meet with clinic FRG.  3.  I will communicate with CHW at outreach and express questions/concerns as they arise. (Continuous)    Date goal set: 9/4/19  Discussed/updated:      ? I would like a referral for an [facility] worker within the next 90 days (pt-stated)      Action steps to achieve this goal  1.  My CHW will follow up with Krystyna garcia [facility] regarding the [facility] referral.     Date goal set:  5/14/19  Discussed/updated: 5/23/19; 5/24/19; 6/18/19; 7/2/19; 8/29/19; 9/4/19      ? I would like assistance resolving outstanding  issues/fees associated with my  [place] drivers license (pt-stated)      Action steps to achieve this goal  1.  I will meet with HENRI LIGHT again on 10/14/19 to discuss resolution of speeding ticket received in 2017.   2.  I will bring a valid money order to my appointment with CCC RN on 9/10/19; once received, NADEGE will mail this to [facility] for me.  3.  I will pay any outstanding fees and provide any documentation or paperwork necessary to assist with this process. (Continuous)    NOTE: See NADEGE documentation from 19 for further information.    Date goal set:  19  Discussed/updated: 19; 19; 19; 19      ? I would like CCC RN to teach me how to take my meds correctly within the next 60 days (pt-stated)      Action steps to achieve this goal    1.  I will speak with CCC RN at outreach calls  2. I will attend my appt with CCC RN on 9/10/19 at 3pm.   3. I will take all my medications as set up by CCC RN.   4. I will check my B/P daily + PRN when I have the blood pressure cuff - CCC RN will check to see if insurance will pay for it.  5. I will call CCC RN or CHW with any concerns or questions.     Date goal set:  19  Discussed/updated:      ? I would like support and assistance applying for my [place]hip within the next 6 months (pt-stated)      (GOAL ON HOLD): No further progress can be made on this goal until issue with pt's [place] drivers license is resolved.    Action steps to achieve this goal  1.  I will resolve issues with my [place] drivers license before scheduling an appointment with HENRI LIGHT to complete [facility] phone intake.  2.  Once issues with my [place] drivers license are resolved, I will contact [facility] to set up an appointment with HENRI LIGHT to complete [facility] phone intake.  3.  In the meantime, will continue attending English/Citizenship classes. (Continuous)  4.  I will communicate with [facility] at outreach and express questions/concerns as they arise.  (Continuous)      Date goal set: 5/14/19  Discussed/updated: 5/23/19; 5/24/19; 6/18/19          Future Appointments              In 6 days RLN [facility] ESTELA [facility] Clinical Support, RLN Clinic    In 1 month Montrell Zarate MD [facility]/OB , RLN Clinic    In 1 month RLN [facility] NADEGE [facility] Clinical Support, RLN Clinic

## 2021-06-28 NOTE — PROGRESS NOTES
Progress Notes by Lyudmila Glover RN at 12/27/2019  9:00 AM     Author: Lyudmila Glover RN Service: -- Author Type: Registered Nurse    Filed: 12/30/2019 10:40 AM Encounter Date: 12/27/2019 Status: Signed    : Lyudmila Glover RN (Registered Nurse)       Clinic Care Coordination Contact    Follow Up Progress Note      Assessment: transplant list   Spoke with patient via phone today  Chart review completed.   Patient was placed as inactive on transplant list.     Writer states no one has not review the letter with him yet.   Writer reviewed the letter from transplant clinic via phone today but patient would benefit from another review in details with home care nurse. Message sent to home care  regarding this.     Goals addressed this encounter:   Goals        Patient Stated    ? I will keep my health insurance active the next 6 months.  (pt-stated)      Action steps to achieve this goal  1.  I will speak with CHW at outreach calls.   2. I will answer my phone or return calls in a timely manner.  3. I will provide necessary documents as needed  4. I will work with my [facility] worker with [employer]  5. I will call CHW with any concerns or questions.     Date goal set:  12/27/19      ? RN Goal: I would like CCC RN to coordinate care with [facility] re: kidney transplant in the next 6 months.  (pt-stated)      Action steps to achieve this goal  1.  I will speak with CCC RN at outreach calls.  2.  CCC  RN will reach out to [facility] kidney transplant team to find out if patient is on the list - CCC RN left a message for NADEGE Tripp with [facility] at [phone], pending.     Goal on hold until March, 20 due to inactive list with transplant list   Date goal set:  9/10/19  Discussed/updated: 11/5/19; 12/27/19            Plan:   1) No further assist needed from CCC RN the 5-6 months. CCC RN will do outreach call close to 6 months and to coordinate care with transplant clinic/team.   2) CHW to do monthly outreach to check  on insurance  3) Patient will keep his housing and insurance active the next 6 months  4. Patient will take his meds as set up by home care nurse daily the next 6 months.

## 2021-06-29 NOTE — PROGRESS NOTES
Progress Notes by Lyudmila Glover RN at 7/6/2020  1:00 PM     Author: Lyudmila Glover RN Service: -- Author Type: Registered Nurse    Filed: 7/6/2020 12:57 PM Encounter Date: 7/6/2020 Status: Signed    : Lyudmila Glover RN (Registered Nurse)       Clinic Care Coordination Contact    Clinic Care Coordination Contact  OUTREACH    Referral Information:  Referral Source: PCP    Primary Diagnosis: CKD    Chief Complaint   Patient presents with   ? Clinic Care Coordination - Follow-up     Clinic Utilization  Difficulty keeping appointments:: No  Compliance Concerns: No  No PCP office visit in Past Year: No  Utilization    Last refreshed: 7/5/2020 10:14 PM:  Hospital Admissions 2           Last refreshed: 7/5/2020 10:14 PM:  ED Visits 1           Last refreshed: 7/5/2020 10:14 PM:  No Show Count (past year) 2              Current as of: 7/5/2020 10:14 PM              Clinical Concerns:  RN re=assessment completed today.   Chart review completed.   Spoke with patient via phone.   Patient states he still would like to get on kidney transplant list.   States he has no other concerns.   Home care nurse set up meds weekly. Has no concerns. [facility].     Writer called [facility] [place] and left a voicemail message for NADEGE Tripp today requesting a return call back.   Patient was taken off active transplant list last year due to non-compliance. Writer spoke with NADEGE Tripp at the end of last year and was told to call back in 6 months to review patient's case, pending a call back.       Pain  Pain (GOAL):: No  Health Maintenance Reviewed:    Clinical Pathway: None     Medication Management:   [facility] nurse setting up meds.   Has no concerns.   Unable to review meds via phone today      Functional Status:  Mobility Status: Independent    Living Situation:  Current living arrangement:: I live in a private home with family    Lifestyle & Psychosocial Needs:        Diet:: Regular  Inadequate nutrition (GOAL):: No  Tube Feeding: No  Inadequate  activity/exercise (GOAL):: No  Significant changes in sleep pattern (GOAL): No  Transportation means:: Medical transport     Lutheran or spiritual beliefs that impact treatment:: No  Mental health DX:: No  Mental health management concern (GOAL):: No  Informal Support system:: Family   Socioeconomic History   ? Marital status: Single     Spouse name: Not on file   ? Number of children: Not on file   ? Years of education: Not on file   ? Highest education level: Not on file     Tobacco Use   ? Smoking status: Never Smoker   ? Smokeless tobacco: Never Used   Substance and Sexual Activity   ? Alcohol use: No   ? Drug use: No           Resources and Interventions:  Current Resources:      Community Resources: [facility], , Dialysis Services          Advance Care Plan/Directive  Advanced Care Plans/Directives on file:: No  Advanced Care Plan/Directive Status: Considering Options          Goals:   Goals        General    RN Goal: I would like CCC RN to coordinate care with [facility] re: kidney transplant in the next 6 months.  (pt-stated)     Notes - Note edited  4/20/2020  2:46 PM by Lyudmila Glover RN    Action steps to achieve this goal  1.  I will speak with CCC RN at outreach calls.  2.  CCC  RN will reach out to [facility] kidney transplant team to find out if patient is on the list - CCC RN left a message for NADEGE Tripp with [facility] at [phone], pending. ( On hold due to covid-19)     Goal on hold until June, 20 due to inactive list with transplant list   Date goal set:  9/10/19  Discussed/updated: 11/5/19; 12/27/19; 3/31/2020; 4/20/2020               Future Appointments              Today RLN CCC RN [facility] Clinical Support, N Clinic    In 3 days [facility] May[facility]          Plan:   1) CCC RN will f/u 8/6/2020 or sooner depends on a return call back from kidney transplant SW.

## 2021-06-29 NOTE — PROGRESS NOTES
Progress Notes by Malina Trevizo CHW at 8/3/2020  8:30 AM     Author: Malina Trevizo CHW Service: -- Author Type: Community Health Worker    Filed: 8/3/2020  8:44 AM Encounter Date: 8/3/2020 Status: Signed    : Malina Trevizo CHW (Community Health Worker)       CHW contacted NADEGE Tripp at [facility] and left a detailed vm requesting a call back regarding Digna's  upcoming Friday 8/7 appointment to determine if transportation was scheduled and if not, the CHW needs the time of the appt in order to schedule transportation. CHW provided email address and call back phone umber.    CHW did send Kiah two emails in the past few weeks without any follow up from Kiah as well an email was sent to Rosangela Carranza who is her back up. See Below.

## 2021-07-04 NOTE — LETTER
Letter by Lyudmila Glover RN at      Author: Dah, Lyudmila, RN Service: -- Author Type: --    Filed:  Encounter Date: 6/24/2021 Status: (Other)       CARE COORDINATION  [address], [address]  [place] [address]      June 24, 2021    Digna Gutierrez  [address]  [place] [address]      Dear Digna,    I am a clinic care coordinator who works with Montrell Zarate MD at [address], [facility] [address]  [place] [address]    . I wanted to thank you for spending the time to talk with me.  Below is a description of clinic care coordination and how I can further assist you.      The clinic care coordination team is made up of a registered nurse,  and community health worker who understand the health care system. The goal of clinic care coordination is to help you manage your health and improve access to the health care system in the most efficient manner. The team can assist you in meeting your health care goals by providing education, coordinating services, strengthening the communication among your providers and supporting you with any resource needs.    Please feel free to contact me and the Community Health Worker at [phone] or [phone] with any questions or concerns. We are focused on providing you with the highest-quality healthcare experience possible and that all starts with you.     Sincerely,     Lyudmila Glover RN    Enclosed: I have enclosed a copy of the Care Plan. This has helpful information and goals that we have talked about. Please keep this in an easy to access place to use as needed.

## 2021-07-04 NOTE — LETTER
Letter by Lyudmila Glover RN at      Author: Lyudmila Glover RN Service: -- Author Type: --    Filed:  Encounter Date: 6/24/2021 Status: (Other)       Care Plan  About Me:    Patient Name:  Digna Gutierrez    YOB: 1985  Age:         35 y.o.   [facility] MRN:    [id number] Telephone Information:  Home Phone [phone]   Mobile [phone]       Address:  [address] [place] [address] Email address:  No e-mail address on record      Emergency Contact(s)  Extended Emergency Contact Information    **No emergency contact data on file**        Primary language:  Kosovan     needed? Yes   [facility] Language Services:  [phone] op. 1  Other communication barriers: English as a second language, Language barrier, Physical impairment, Caregiver  Preferred Method of Communication:     Current living arrangement: I live in a private home  Mobility Status/ Medical Equipment: Independent    Health Maintenance  Health Maintenance Reviewed:      My Access Plan  Medical Emergency 911   Primary Clinic Line Montrell Zarate MD - [phone]   24 Hour Appointment Line [phone] or  [phone]) (toll-free)   24 Hour Nurse Line [phone] (toll-free)   Preferred Urgent Care [facility], [phone]   [facility]  [phone]   Preferred Pharmacy [facility] - [place], [place] - [address]     Behavioral Health Crisis Line The National Suicide Prevention Lifeline at 1-928.981.2011 or 911             My Care Team Members  Patient Care Team       Relationship Specialty Notifications Start End    Montrell Zarate MD PCP - General Family Medicine  9/12/18     Phone: [phone] Fax: [phone]         [address] [place] [address]    Malina Trevizo CHW Community Health Worker Primary Care - CC Admissions 5/14/19     PH: [phone]  Fax: [phone]    Phone: [phone] Fax: [phone]         Lyudmila Glover, RN Lead Care Coordinator Primary Care - CC Admissions 7/11/19     Fax: [phone]         Montrell Zarate MD Assigned PCP   8/17/20     Phone: [phone] Fax: [phone]         [facility] [address] [place] [address]    Margaret Mirza, RN Registered Nurse Transplant  11/1/20     Transplant Coordinator w/ [facility] - see letter in Media tab from [place] Transplant Team dated 12/9/19    Phone: [phone]         NADEGE Tripp  Transplant  11/1/20     Transplant  w/ [facility] - see letter in Media tab from [place] Transplant Team dated 12/9/19    Phone: [phone]         Kiah Verduzco [facility] worker  Behavioral Health  11/12/20     [facility]    Phone: [phone]         Viviana Mayberry RN RN, Care  Health Services  11/13/20     [facility] (SOC = 9/17/19); can also be reached through Epic in-basket messaging    Phone: [phone]         Yakelin Jean LPN  Licensed Practical Nurse Home Health Services  11/13/20     [facility] (SOC = 9/17/19); visit nurse    Phone: [phone]         [facility] Care Patient Care Assistant   12/4/20     PCA  Agency, approved for 4 hours in November 2020    Phone: [phone] Fax: [phone]        Krystyna [facility] Financial Worker    3/30/21     [facility] benefits and health insurance    Phone: [phone]                 My Care Plans  Self Management and Treatment Plan  Goals and (Comments)  Goals        General    Financial Wellbeing (pt-stated)     Notes - Note edited  5/27/2021  9:26 AM by Malina Trevizo, CHW    Goal Statement: I will resolve my current outstanding balance with [facility] Billing Department in the next 60 days.    Date Goal set: 5/17/2021  Barriers: language barrier, lack of knowledge  Strengths: Agrees to answer the phone, agrees to work on goals   Date to Achieve By: 7/17/2021  Patient expressed  understanding of goal: Yes    Action steps to achieve this goal:  1. I will disregard my current [facility] bill and will continue to work with the CHW on this concern.  2. I will provide any additional information that may be needed to resolve the balance.  3. I will request to be referred to the FRW if I still have a balance in July.    Note: Per [facility] Billing Department, the EOB states it lacks information, but they are working on the claims issues.        Medical (pt-stated)     Notes - Note edited  5/17/2021 12:13 PM by Lyudmila Glover, RN    Goal Statement: I would like to be evaluated to get on active transplant list the next 90 days.     Date Goal set: 5/4/2021  Barriers: language barrier, lack of knowledge, lack of support  Strengths: Agrees to answer the phone, agrees to work on goals   Date to Achieve By: 8/4/2021  Patient expressed understanding of goal: Yes    Action steps to achieve this goal:  1. I will speak with CCC RN at outreach calls.   2. I will take my meds as prescribed and set up by home care nurse.   3. I will call CCC RN with any concerns or questions.   4. I will continue to work on my goals with [facility] radha Dupont.   5. I will attend my follow up appointment at [facility] on 5/4/2021 at 10am with Eloise Maldonado, PhD, LP. ( completed)               Advance Care Plans/Directives Type:        My Medical and Care Information  Problem List   Patient Active Problem List   Diagnosis   ? ESRD (end stage renal disease) on dialysis (H)   ? GERD (gastroesophageal reflux disease)   ? Polypharmacy   ? Slow transit constipation   ? Language barrier affecting health care   ? Cough with hemoptysis   ? Epigastric pain   ? Essential hypertension   ? Hypertensive urgency   ? ESRD (end stage renal disease) (H)   ? LVH (left ventricular hypertrophy)   ? Major depressive disorder with single episode, in partial remission (H)      Current Medications and Allergies:  See printed Medication Report.    Care  Coordination Start Date: 7/6/2020   Frequency of Care Coordination: 6 weeks   Form Last Updated: 06/24/2021

## 2021-07-04 NOTE — PROGRESS NOTES
Progress Notes by Lyudmila Glover RN at 6/24/2021  9:30 AM     Author: Lyudmila Glover RN Service: -- Author Type: Registered Nurse    Filed: 6/24/2021 10:22 AM Encounter Date: 6/24/2021 Status: Addendum    : Lyudmila Glover RN (Registered Nurse)    Related Notes: Original Note by Lyudmila Glover RN (Registered Nurse) filed at 6/24/2021 10:09 AM       Clinic Care Coordination Contact    Clinic Care Coordination Contact  OUTREACH    Referral Information:  Referral Source: PCP    Primary Diagnosis: CKD    Chief Complaint   Patient presents with   ? Clinic Care Coordination - Follow-up     Clinic Utilization  Difficulty keeping appointments:: No  Compliance Concerns: No  No-Show Concerns: No  No PCP office visit in Past Year: No  Utilization    Last refreshed: 6/24/2021  9:41 AM: Hospital Admissions 0           Last refreshed: 6/24/2021  9:41 AM: ED Visits 0           Last refreshed: 6/24/2021  9:41 AM: No Show Count (past year) 1              Current as of: 6/24/2021  9:41 AM              Clinical Concerns:  - CCC RN Yearly re-assessment completed today.   - Spoke with patient via phone today.     - Patient will continue to be enrolled with [facility]. He continues to need assist coordinate his care with transplant team from [facility]. Patient had been trying to get on active transplant list the past 2 years but he needed to follow up on things listed below requested by [facility] transplant team which he completed everything as of 5/4/2021.     Kiah Jalloh, [facility] - 04/30/2021 9:55 AM CDT  Formatting of this note might be different from the original.  D/A: Received call from Critical access hospital worker Kiah Verduzco who had pt and RN Care Coordinator Lyudmila on speaker phone. Goals reviewed as follows:    1. Obtain Critical access hospital worker - done. May is approved to provide 6 hours per week. She helps pt with insurance renewal paperwork and future planning.    2. Get a psychiatric assessment. Pt's PCP did not believe pt had significant mental health issues  requiring referral to psychiatry however May reports pt has struggled with depression and she is presently working with him on anger management issues. Pt has an appointment to meet with psychologist Dr Eloise Maldonado 5/4/21.    3. Have stable housing. Pt now resides at [address] [place] [address] [address] [place] [address]. He received back pay from Social Security and with that money paid his rent for the next 18 months. The plan is for pt to transition to [address] 42 housing after 18 months. [address] 42 housing will be more affordable for pt. May will arrange this for pt. Pt confirms with writer that he agrees with this plan.    4. Have care partners. Pt has no friends or family to assist him but his [facility] worker May agrees to be his care partner and attend some appointments with pt and pt has 4.5 hours daily PCA. PCA does drive and can attend appointments with pt.     5. Pt is on Medicare A&B and Blue Plus Medical Assistance. He has no spend down. May doesn't know why pt isn't on QMB but says she will work with pt to get on the state buy in program.    Informed pt, May and Lyudmila the next step is for pt to meet with Dr. Maldonado. Stressed the importance of not missing this appointment. After her report is reviewed pt will be contacted for next steps.    R: Pt aware of how to reach writer.   P: will follow PRN.  May Jalloh # [phone]      - He had neuro-psych assessment completed on 54/2021.   I have no concerns regarding Mr. Gutierrez moving forward with the transplant process.     Eloise Maldonado, Ph.D., L.P.  Senior Clinical Psychologist    - [facility] RN left a vm today for May Yeimi to inquire about the next step for the patient post neuro-psych assessment.   - [facility] RN also reached out to ESPINOZA Olvera today to assist patient with health care directive if he agrees.       1) outstanding balance of $368  Clinic Care Coordination Contact  Financial Resource Worker Screening    Any other information for the FRW?: outstand balance of  $368. CHW spoke with billing department last month but still not resolved. Please assist. Thank you!    Care Coordination team will tell patient:   Thank you for answering all the questions, based on screening questions, our Financial Resource Worker will reach out to you with additional questions and next steps.          Pain  Pain (GOAL):: No  Health Maintenance Reviewed:    Clinical Pathway: Clinic Care Coordination CKD Assessment                    Symptom Review :  CKD Symptoms:  Appetite Changes:: No  Chest pain:: No  Diarrhea:: No  Fluid retention:: No  Concern with urination:: No  Home BP monitoring:: No  Joint pain:: No  Change in mental status:: No  Muscle cramping:: No  Nausea:: No  Vomiting:: No  NSAID use:: No  Rash:: No  Shortness of breath: No  Vascular access:: No  At Goal Weight:: No  Overall your CKD symptoms are (GOAL):: Stable    CKD  CKD Associated with:: Hypertension  Dialysis:: Yes  Dialysis Center and Phone:: [facility]  [address] [place] [id number]: [phone] Schedule: M, W, & F from 12-5:30pm  ACE or ARB prescribed:: Yes  How confident are you with the plan we have identified?: 5     Medication Management:  Unable to review meds today.   - home care nurse sets up meds weekly.     Functional Status:  Dependent ADLs:: Bathing, Dressing, Grooming  Dependent IADLs:: Cleaning, Cooking, Laundry, Shopping, Meal Preparation, Medication Management, Transportation  Bed or wheelchair confined:: No  Mobility Status: Independent  Fallen 2 or more times in the past year?: No  Any fall with injury in the past year?: No    Living Situation:  Current living arrangement:: I live in a private home  Type of residence:: Apartment    Lifestyle & Psychosocial Needs:        Diet:: Regular  Inadequate nutrition (GOAL):: No  Tube Feeding: No  Inadequate activity/exercise (GOAL):: No  Significant changes in sleep pattern (GOAL): No  Transportation means:: Medical transport, Friend  Financial/Insurance  concerns (GOAL):: No  Scientology or spiritual beliefs that impact treatment:: No  Mental health DX:: Yes  Mental health DX how managed:: Medication  Mental health management concern (GOAL):: No  Informal Support system:: Family, Friends, Other(PCA and [facility] worker)   Socioeconomic History   ? Marital status: Single     Spouse name: Not on file   ? Number of children: Not on file   ? Years of education: Not on file   ? Highest education level: Not on file     Tobacco Use   ? Smoking status: Never Smoker   ? Smokeless tobacco: Never Used   ? Tobacco comment: no passive exposure   Substance and Sexual Activity   ? Alcohol use: No   ? Drug use: No       Resources and Interventions:  Current Resources:   Skilled Home Care Services: Skilled Nursing  Community Resources: County Worker, Dialysis Services, Home Care, ARMHS, PCA,   Supplies Currently Used at Home: None  Equipment Currently Used at Home: none  Type of Employment: Disability       Advance Care Plan/Directive  Advanced Care Plans/Directives on file:: No  Advanced Care Plan/Directive Status: Considering Options    Referrals Placed: None     Goals:   Goals        General    Financial Wellbeing (pt-stated)     Notes - Note edited  5/27/2021  9:26 AM by Malina Trevizo, CHW    Goal Statement: I will resolve my current outstanding balance with [facility] Billing Department in the next 60 days.    Date Goal set: 5/17/2021  Barriers: language barrier, lack of knowledge  Strengths: Agrees to answer the phone, agrees to work on goals   Date to Achieve By: 7/17/2021  Patient expressed understanding of goal: Yes    Action steps to achieve this goal:  1. I will disregard my current [facility] bill and will continue to work with the CHW on this concern.  2. I will provide any additional information that may be needed to resolve the balance.  3. I will request to be referred to the FRW if I still have a balance in July.    Note: Per [facility] Billing Department, the EOB states it  lacks information, but they are working on the claims issues.        Medical (pt-stated)     Notes - Note edited  5/17/2021 12:13 PM by Lyudmila Glover, RN    Goal Statement: I would like to be evaluated to get on active transplant list the next 90 days.     Date Goal set: 5/4/2021  Barriers: language barrier, lack of knowledge, lack of support  Strengths: Agrees to answer the phone, agrees to work on goals   Date to Achieve By: 8/4/2021  Patient expressed understanding of goal: Yes    Action steps to achieve this goal:  1. I will speak with CCC RN at outreach calls.   2. I will take my meds as prescribed and set up by home care nurse.   3. I will call CCC RN with any concerns or questions.   4. I will continue to work on my goals with [facility] radha Dupont.   5. I will attend my follow up appointment at [facility] on 5/4/2021 at 10am with Eloise Maldonado, PhD, LP. ( completed)          Outreach Frequency: 6 weeks      Plan:   1) CCC RN will follow up in 6 weeks or sooner if needed.

## 2021-07-06 ENCOUNTER — COMMUNICATION - HEALTHEAST (OUTPATIENT)
Dept: ADMINISTRATIVE | Facility: CLINIC | Age: 36
End: 2021-07-06

## 2021-07-06 NOTE — TELEPHONE ENCOUNTER
Telephone Encounter by Jim Pelletier at 7/6/2021  3:37 PM     Author: Jim Pelletier Service: -- Author Type: Patient Access    Filed: 7/6/2021  3:46 PM Encounter Date: 7/6/2021 Status: Signed    : Jim Pelletier (Patient Access)       Reason for Call:  Home Health Care    Mitzi RN with [facility] called regarding (reason for call): verbal orders    Orders are needed for this patient. Skilled Nursing    PT: n/a    OT: n/a    Skilled Nursing: 2x9 & 3 prn for MTM, general health assessement, adjust medications in med box    Pt Provider: Elliot    Phone Number Homecare Nurse can be reached at: [phone]    Can we leave a detailed message on this number? Yes - confidential vm    Phone number patient can be reached at:   Cell number on file:    Telephone Information:   Mobile [phone]       Best Time: anytime    Call taken on 7/6/2021 at 3:40 PM by Jim Pelletier

## 2021-07-06 NOTE — TELEPHONE ENCOUNTER
Telephone Encounter by Jose Armando Vivas CMA at 7/6/2021  4:28 PM     Author: Jose Armando Vivas CMA Service: -- Author Type: Certified Medical Assistant    Filed: 7/6/2021  4:28 PM Encounter Date: 7/6/2021 Status: Signed    : Jose Armando Vivas CMA (Certified Medical Assistant)       CMT called and left detailed message. Thanks.

## 2021-07-06 NOTE — TELEPHONE ENCOUNTER
Telephone Encounter by Montrell Zarate MD at 7/6/2021  4:20 PM     Author: Montrell Zarate MD Service: -- Author Type: Physician    Filed: 7/6/2021  4:20 PM Encounter Date: 7/6/2021 Status: Signed    : Montrell Zarate MD (Physician)       OK

## 2021-07-07 ENCOUNTER — MEDICAL CORRESPONDENCE (OUTPATIENT)
Dept: HEALTH INFORMATION MANAGEMENT | Facility: CLINIC | Age: 36
End: 2021-07-07

## 2021-07-07 NOTE — PROGRESS NOTES
Clinic Care Coordination Contact    Follow Up Progress Note      - CCC RN received a call back from NADEGE Olvera - [facility] today.   - Per NADEGE Olvera - someone from transplant team will reach out to patient to schedule appt for evaluation soon but she's not sure when that would be.   - CCC RN instructed patient the important of answering his phone. Verbalized understanding.     Plan:   - CCC RN will follow up in 6 weeks or sooner if needed.

## 2021-07-08 ENCOUNTER — MEDICAL CORRESPONDENCE (OUTPATIENT)
Dept: HEALTH INFORMATION MANAGEMENT | Facility: CLINIC | Age: 36
End: 2021-07-08

## 2021-07-13 ENCOUNTER — PATIENT OUTREACH (OUTPATIENT)
Dept: CARE COORDINATION | Facility: CLINIC | Age: 36
End: 2021-07-13

## 2021-07-13 NOTE — PROGRESS NOTES
Epic Merge Chart Reconcile: Clinic Care Coordination    Reconciled Information from most recent charting completed by CHW in [facility].    Date of last chart review by CHW: 6/25/2021    CHW Next Follow-up: 7/22/2021

## 2021-07-14 DIAGNOSIS — Z53.9 DIAGNOSIS NOT YET DEFINED: Primary | ICD-10-CM

## 2021-07-14 PROCEDURE — G0179 MD RECERTIFICATION HHA PT: HCPCS | Performed by: FAMILY MEDICINE

## 2021-07-21 ENCOUNTER — PATIENT OUTREACH (OUTPATIENT)
Dept: CARE COORDINATION | Facility: CLINIC | Age: 36
End: 2021-07-21

## 2021-07-21 NOTE — PROGRESS NOTES
Clinic Care Coordination Contact     Situation: Patient chartreviewed by CHW.     Background: Enrolled patient with goal of being evaluated for a transplant.     Assessment: CHW consulted with the CCC RN who states that the patient has another upcoming transplant evaluation on 8/12/2021.    Plan/Recommendations: CCC RN informed the CHW that the CHW should not call patient as scheduled today. CCC RN would like to follow-up with the patient on this complex medical situation on 8/3/2021.     CHW Next Follow-up: 1 month    CHW Plan: Review the chart in 1 month, consult with CCC RN again if needed.

## 2021-08-03 ENCOUNTER — PATIENT OUTREACH (OUTPATIENT)
Dept: NURSING | Facility: CLINIC | Age: 36
End: 2021-08-03

## 2021-08-03 ENCOUNTER — TELEPHONE (OUTPATIENT)
Dept: FAMILY MEDICINE | Facility: CLINIC | Age: 36
End: 2021-08-03

## 2021-08-03 NOTE — LETTER
[facility] Home  Complex Care Plan  About Me:    Patient Name:  Digna Gutierrez    YOB: 1985  Age:         35 year old   Leobardo MRN:    [id number] Telephone Information:  Home Phone [phone]   Mobile [phone]       Address:  [facility] [place] [address] Email address:  No e-mail address on record      Emergency Contact(s)    Name Relationship Lgl Grd Work Phone Home Phone Mobile Phone           Primary language:  Surinamese     needed? Yes   [place] [employer]:  [phone] op. 1  Other communication barriers: English as a second language, Language barrier, Physical impairment, Caregiver  Preferred Method of Communication:     Current living arrangement:    Mobility Status/ Medical Equipment:      Health Maintenance  Health Maintenance Reviewed:      My Access Plan  Medical Emergency 911   Primary Clinic Line   - [phone]   24 Hour Appointment Line [phone] or  [phone]) (toll-free)   24 Hour Nurse Line [phone] (toll-free)   Preferred Urgent Care     Preferred Hospital     Preferred Pharmacy [facility] - [place], [place] - [address]     Behavioral Health Crisis Line The National Suicide Prevention Lifeline at 1-718.676.9519 or 911             My Care Team Members  Patient Care Team       Relationship Specialty Notifications Start End    Montrell Zarate MD PCP - General   9/12/18     Phone: [phone] Fax: [phone]         1985 [address] [facility] [address] [place] [address]    Malina Trevizo Community Health Worker Primary Care - CC Admissions 5/14/19     PH: [phone]  Fax: [phone]    Lyudmila Glover RN Lead Care Coordinator Primary Care - CC Admissions 7/11/19     Montrell Zarate MD Assigned PCP   6/16/21     Phone: [phone] Fax: [phone]         1985 [address] [facility] [address] [place] [address]    Margaret Mirza RN Transplant Coordinator Transplant  7/20/21     [facility] - RN Transplant Care  Coordinator    Phone: [phone]         NADEGE Tripp  Transplant  7/20/21     [facility] - Transplant     Phone: [phone]         Kiah Verduzco ARMHS worker   7/20/21     [facility]    Phone: [phone]         Magnan, Tonya R, RN Home Care Nurse HOME HEALTH AGENCY ([facility] ([place])  7/20/21     [facility] - Mitzi's Cell: [phone]    [facility] Care - PCA Agency Personal Care Attendant PCA   7/20/21     4/hrs of dialy PCA approved in November 2020    Phone: [phone] Fax: [phone]        Krystyna Financial Worker County Worker   7/20/21     [facility] - Insurance and County benefits    Phone: [phone]                 My Care Plans  Self Management and Treatment Plan  Goals and (Comments)  Goals        General     Medical (pt-stated)      Notes - Note edited  8/3/2021 11:06 AM by Lyudmila Glover RN     Goal Statement: I would like to be evaluated to get on active transplant list the next 6 months.     Date Goal set: 5/4/2021   Barriers: language barrier, lack of knowledge, lack of support   Strengths: Agrees to answer the phone, agrees to work on goals   Date to Achieve By: 11/4/2021   Patient expressed understanding of goal: Yes     Action steps to achieve this goal:   1. I will attend my next Transplant Evaluation appointment on 8/12/2021 at 1:30pm through [facility]   2. I will continue to work on my goals with [facility] workerKiah through [facility].  3. I will answer my phone when the [facility] RN follows up with me regarding this goal.    [facility] Transplant Program - [phone]  [address] [place] [address] [facility] [address]             Action Plans on File:                       Advance Care Plans/Directives Type:        My Medical and Care Information  Problem List   Patient Active Problem List   Diagnosis     ESRD (end stage renal disease) on dialysis (H)     GERD  (gastroesophageal reflux disease)     Polypharmacy     Slow transit constipation     Language barrier affecting health care     Cough with hemoptysis     Epigastric pain     Essential hypertension     Hypertensive urgency     ESRD (end stage renal disease) (H)     LVH (left ventricular hypertrophy)     Major depressive disorder with single episode, in partial remission (H)      Current Medications and Allergies:  See printed Medication Report.    Care Coordination Start Date: 7/6/2020   Frequency of Care Coordination: 6 weeks   Form Last Updated: 08/03/2021

## 2021-08-03 NOTE — TELEPHONE ENCOUNTER
Reason for Call:  Home Health Care    Mitzi  with accent  Homecare called regarding (reason for call): Verbal     Orders are needed for this patient.     PT:     OT:     Skilled Nursing: decrease every 3 weeks - stable     Pt Provider: Montrell Zarate     Phone Number Homecare Nurse can be reached at: [phone]    Can we leave a detailed message on this number? yes        Call taken on 8/3/2021 at 11:40 AM by NEIDA Almonte

## 2021-08-03 NOTE — PROGRESS NOTES
Clinic Care Coordination Contact    Follow Up Progress Note      Assessment: follow up on goal  - 45 day chart review completed today.     - spoke with Kiah today via phone for follow up she left CCC RN a message on Friday.   - Per Kiah, she's planning to attend transplant eval appt with patient on 8/12//2021 at 1:30pm.       1) transplant eval   - scheduled on 8/12/2021 at 1:30pm.   - PCA will provide transportation and Kiah plans to attend.   - Suma DORAN worker will call CCC RN with updates post appt.       Goals addressed this encounter:   Goals Addressed                    This Visit's Progress       Medical (pt-stated)   50%      Goal Statement: I would like to be evaluated to get on active transplant list the next 6 months.     Date Goal set: 5/4/2021   Barriers: language barrier, lack of knowledge, lack of support   Strengths: Agrees to answer the phone, agrees to work on goals   Date to Achieve By: 11/4/2021   Patient expressed understanding of goal: Yes     Action steps to achieve this goal:   1. I will attend my next Transplant Evaluation appointment on 8/12/2021 at 1:30pm through [facility]   2. I will continue to work on my goals with [facility] workerKiah through [facility].  3. I will answer my phone when the [facility] RN follows up with me regarding this goal.    [facility] Transplant Program - [phone]  [address] [place] [address] [facility] [address]          Outreach Frequency: 6 weeks    Plan:   1) Patient will attend his transplant eval with his PCA and Kiah garcia on 8/12/2021 at 1:30pm.   2) CCC RN will follow up in 6 weeks or sooner if needed.

## 2021-08-19 ENCOUNTER — PATIENT OUTREACH (OUTPATIENT)
Dept: NURSING | Facility: CLINIC | Age: 36
End: 2021-08-19

## 2021-08-19 NOTE — PROGRESS NOTES
Clinic Care Coordination Contact  Community Health Worker Follow Up      Care Gaps:     Currently there are no Care Gaps for the CHW to address.        Goals:     Goals Addressed                    This Visit's Progress       Medical (pt-stated)   60%      Goal Statement: I would like to be evaluated to get on active transplant list the next 6 months.     Date Goal set: 5/4/2021   Barriers: language barrier, lack of knowledge, lack of support   Strengths: Agrees to answer the phone, agrees to work on goals   Date to Achieve By: 11/4/2021   Patient expressed understanding of goal: Yes     Action steps to achieve this goal:   1. I attended my Transplant Evaluation appointment on 8/12/2021 at 1:30pm through [facility]. (Completed)  2. I will continue to work on my goals with [facility] workerKiah through [facility].  3. I will answer my phone when the [facility] RN follows up with me regarding this goal.    [facility] Transplant Program - [phone]  [address] [place] [address] [address] [address]              Intervention and Education during outreach:     Digna reports he did attend his [facility] Transplant appointment on 8/12/2021. CHW does not have access to this information through Epic and there are no records scanned in yet. He reports continued support from his ARM WorkerKiah through [facility] and does not report any new concerns or questions.    Digna agreed to schedule his Annual Physical appointment which is not due until 12/29/2021 or later. CHW assisted with scheduling the Annual Physical - Thursday 12/30/2021 at 10am. The CHW did notify the ARMHS worker of this appointment information.       CHW Next Follow-up: 1 month    CHW Plan: Review the chart and outreach to the patient at the next outreach.

## 2021-09-01 ENCOUNTER — MEDICAL CORRESPONDENCE (OUTPATIENT)
Dept: HEALTH INFORMATION MANAGEMENT | Facility: CLINIC | Age: 36
End: 2021-09-01

## 2021-09-02 ENCOUNTER — TELEPHONE (OUTPATIENT)
Dept: FAMILY MEDICINE | Facility: CLINIC | Age: 36
End: 2021-09-02

## 2021-09-02 NOTE — TELEPHONE ENCOUNTER
Reason for Call:  Home Health Care    Yamila  with [facility] called regarding (reason for call): verbal     Orders are needed for this patient.     PT:     OT:     Skilled Nursing: 1x 3 weeks next 60 days 3 prn - MTM     Pt Provider: Montrell Zarate     Phone Number Homecare Nurse can be reached at: [phone]      Can we leave a detailed message on this number? YES        Call taken on 9/2/2021 at 8:51 AM by NEIDA Almonte

## 2021-09-16 ENCOUNTER — PATIENT OUTREACH (OUTPATIENT)
Dept: CARE COORDINATION | Facility: CLINIC | Age: 36
End: 2021-09-16

## 2021-09-16 NOTE — PROGRESS NOTES
Clinic Care Coordination Contact    Follow Up Progress Note      Assessment: follow up post transplant eval  Per patient he was told he will be placed on active transplant list. CC RN left a vm for [facility] worker to follow up on status.      Care Gaps:    Health Maintenance Due   Topic Date Due     DEPRESSION ACTION PLAN  Never done     HEPATITIS C SCREENING  Never done     HEPATITIS B IMMUNIZATION (1 of 3 - Risk Recombivax 3-dose series) Never done     LIPID  Never done     PHQ-9  03/03/2021     INFLUENZA VACCINE (1) 09/01/2021     Goals addressed this encounter:   Goals Addressed                    This Visit's Progress       COMPLETED: Medical (pt-stated)   100%      Goal Statement: I would like to be evaluated to get on active transplant list the next 6 months.     Date Goal set: 5/4/2021   Barriers: language barrier, lack of knowledge, lack of support   Strengths: Agrees to answer the phone, agrees to work on goals   Date to Achieve By: 11/4/2021   Patient expressed understanding of goal: Yes     Action steps to achieve this goal:   1. I attended my Transplant Evaluation appointment on 8/12/2021 at 1:30pm through [facility]. (Completed)  2. I will continue to work on my goals with [facility] workerKiah through [facility].  3. I will answer my phone when the CCC RN follows up with me regarding this goal.    [facility] Transplant Program - [phone]  [address] [place] [address] 1[facility] [address]    Note dated on 9/16/2021: Per patient, he will be on active transplant list.          Goals        Medical (pt-stated)         Goal Statement: I want to attend an Annual Physical exam in December, 2021 so that I may address my current health maintenance care gaps with my PCP.     Date Goal set: 9/16/2021  Barriers: language barrier  Strengths: motivated to attend PCP appt  Date to Achieve By: 12/31/2021  Patient expressed understanding of goal: Yes    Action steps to  achieve this goal:  1. I will answer my phone when I am contacted to schedule my Annual Physical.  2. I will attend my Annual Physical appointment at [facility] on 12/30/2021 at 10:00am  3. I will schedule a follow up appointment with my PCP if it is recommended to do so while I am at the clinic.  4. I will follow up with CCC regarding this goal at each outreach until it is completed.                 Outreach Frequency: 6 weeks    Plan:   1) Patient will attend his preventative appt with PCP on 12/30/2021.   2) CC RN will follow up in 6 weeks or sooner if needed pending a call back from [facility] worker.

## 2021-09-20 ENCOUNTER — PATIENT OUTREACH (OUTPATIENT)
Dept: CARE COORDINATION | Facility: CLINIC | Age: 36
End: 2021-09-20

## 2021-09-20 NOTE — PROGRESS NOTES
Clinic Care Coordination Chart Review     Situation: Patient chart reviewed by CHW.     Background: Patient enrolled with [facility].     Assessment: CCC RN is following up on current goal.    Plan/Recommendations: CCC RN informed CHW that they do not need to outreach to the patient on scheduled day.     CHW Next Follow-up: 1 month

## 2021-10-01 ENCOUNTER — TRANSFERRED RECORDS (OUTPATIENT)
Dept: HEALTH INFORMATION MANAGEMENT | Facility: CLINIC | Age: 36
End: 2021-10-01

## 2021-10-02 ENCOUNTER — TRANSFERRED RECORDS (OUTPATIENT)
Dept: HEALTH INFORMATION MANAGEMENT | Facility: CLINIC | Age: 36
End: 2021-10-02

## 2021-10-05 ENCOUNTER — PATIENT OUTREACH (OUTPATIENT)
Dept: CARE COORDINATION | Facility: CLINIC | Age: 36
End: 2021-10-05

## 2021-10-05 DIAGNOSIS — Z71.89 COMPLEX CARE COORDINATION: Primary | ICD-10-CM

## 2021-10-06 ENCOUNTER — MEDICAL CORRESPONDENCE (OUTPATIENT)
Dept: HEALTH INFORMATION MANAGEMENT | Facility: CLINIC | Age: 36
End: 2021-10-06

## 2021-10-11 ENCOUNTER — PATIENT OUTREACH (OUTPATIENT)
Dept: CARE COORDINATION | Facility: CLINIC | Age: 36
End: 2021-10-11

## 2021-10-11 NOTE — PROGRESS NOTES
Clinic Care Coordination Contact    Follow Up Progress Note      Assessment: CC RN received a call from NADEGE Lynch with [facility] transplant team today. Per Alena, she just want to reach out to CC RN to give her number and [facility] to add to patient's chart.      NADEGE Lynch - [phone]   ESTELA No - [phone]    Plan:   CC RN will follow up post discharge from [facility] or in 6 weeks.

## 2021-10-18 NOTE — PROGRESS NOTES
Clinic Care Coordination Contact    Care Team Conversation - Voicemail Message    Received voicemail message from [facility]  Rosangela. She is hoping to connect with someone on [facility]'s care team here at [place]. Call back # is [phone].    I provided [facility] RN Lyudmila Glover with Rosangela's phone number as I have not been involved with [facility]'s care for >1 year. She will f/u with the [facility] transplant team accordingly. I returned oRsangela's call and informed her of the above as well.

## 2021-10-19 ENCOUNTER — PATIENT OUTREACH (OUTPATIENT)
Dept: CARE COORDINATION | Facility: CLINIC | Age: 36
End: 2021-10-19

## 2021-10-19 NOTE — PROGRESS NOTES
Clinic Care Coordination Chart Review     Situation: Patient chart reviewed by CHW.     Background: Enrolled in [facility], recent transplant surgery.     Assessment: CHW consulted with the CCC RN about Digna and next steps.    Plan/Recommendations: CCC RN will follow up with Digna on Friday 10/22/2021. CHW will not follow up this month.     CHW Next Follow-up: 1 month

## 2021-10-22 ENCOUNTER — PATIENT OUTREACH (OUTPATIENT)
Dept: CARE COORDINATION | Facility: CLINIC | Age: 36
End: 2021-10-22

## 2021-10-22 ENCOUNTER — PATIENT OUTREACH (OUTPATIENT)
Dept: NURSING | Facility: CLINIC | Age: 36
End: 2021-10-22

## 2021-10-22 NOTE — PROGRESS NOTES
Clinic Care Coordination Contact    Follow Up Progress Note      Assessment: follow up post hospitalization and kidney transplant. CC RN spoke with patient via phone today. Patient sounded very tired and wanted the telephone visit short. Patient states he was discharged home 4-5 days ago. Per chart review, patient was discharged home on 10/14/2021. Had kidney transplant on 10/2/2021. Patient will continue to follow up with [facility] transplant team closely to detect any signs of infection, rejection, medication non compliance/side effects, as well as other potential multisystem complications with support from his [facility] worker and PCA. MTM from [facility] will manage patient's meds for the next 1-2 months. Patient was discharged from [facility] home care on 10/6. Patient may need to restart home care skilled nursing after [facility] MTM stops managing his meds after 2 months. CC RN needs to confirm this. Patient is scheduled for preventative care on 12/30 and CC RN plans to see patient same day in person. CC RN plans to do chart review and outreach to patient the next 3 months.     Care Gaps:    Health Maintenance Due   Topic Date Due     DEPRESSION ACTION PLAN  Never done     HEPATITIS C SCREENING  Never done     HEPATITIS B IMMUNIZATION (1 of 3 - Risk Recombivax 3-dose series) Never done     LIPID  Never done     PHQ-9  03/03/2021     INFLUENZA VACCINE (1) 09/01/2021       with PCP on 12/30/2021    Goals addressed this encounter:   Goals        Medical (pt-stated)         Goal Statement: I want to attend an Annual Physical exam in December, 2021 so that I may address my current health maintenance care gaps with my PCP.     Date Goal set: 9/16/2021  Barriers: language barrier  Strengths: motivated to attend PCP appt  Date to Achieve By: 12/31/2021  Patient expressed understanding of goal: Yes    Action steps to achieve this goal:  1. I will answer my phone when I am contacted to schedule my Annual Physical.  2. I will attend my  Annual Physical appointment at [facility] on 12/30/2021 at 10:00am  3. I will schedule a follow up appointment with my PCP if it is recommended to do so while I am at the clinic.  4. I will follow up with CCC regarding this goal at each outreach until it is completed.                Outreach Frequency: 6 weeks    Plan:   1) Patient will continue to follow up with [facility] closely as directed.   2) CC RN will follow up in 6 weeks or sooner if needed.   3) CHW do not need to outreach to patient monthly unless instructed by CC RN.

## 2021-10-22 NOTE — PROGRESS NOTES
Clinic Care Coordination Contact  Care Team Conversations    CCC RN reports that this patient does not need CHW outreach calls. CCC RN will be the primary CCC provider to outreach to the patient and complete outreaches.    CCC RN will notify the CHW if they need to be added back to the Care Team.    CHW is removing self from the Care Team today.

## 2021-11-03 PROBLEM — Z94.0 STATUS POST KIDNEY TRANSPLANT: Status: ACTIVE | Noted: 2021-11-03

## 2021-12-08 ENCOUNTER — PATIENT OUTREACH (OUTPATIENT)
Dept: CARE COORDINATION | Facility: CLINIC | Age: 36
End: 2021-12-08
Payer: MEDICARE

## 2021-12-08 NOTE — LETTER
[facility]  Patient Centered Plan of Care  About Me:        Patient Name:  Digna Gutierrez    YOB: 1985  Age:         36 year old   Leobardo MRN:    [id number] Telephone Information:  Home Phone [phone]   Mobile [phone]       Address:  [address] [place] [address] Email address:  No e-mail address on record      Emergency Contact(s)    Name Relationship Lgl Grd Work Phone Home Phone Mobile Phone           Primary language:  Tuvaluan     needed? Yes   [place] [employer]:  [phone] op. 1  Other communication barriers:English as a second language; Language barrier; Physical impairment; Caregiver    Preferred Method of Communication:     Current living arrangement: I live in a private home    Mobility Status/ Medical Equipment: Independent        Health Maintenance  Health Maintenance Reviewed: No data recorded    My Access Plan  Medical Emergency 911   Primary Clinic Line   - [phone]   24 Hour Appointment Line [phone] or  [phone]) (toll-free)   24 Hour Nurse Line [phone] (toll-free)   Preferred Urgent Care [facility] [phone]     Preferred Hospital [facility]  [phone]     Preferred Pharmacy [facility] - [place], [place] - [address] [place] [address]     Behavioral Health Crisis Line The National Suicide Prevention Lifeline at 1-979.159.6003 or 911             My Care Team Members  Patient Care Team       Relationship Specialty Notifications Start End    Montrell Zarate MD PCP - General   9/12/18     Phone: [phone] Fax: [phone]         1985 [facility] [address] [place] [address]    Lyudmila Glover RN Lead Care Coordinator Primary Care - CC Admissions 7/11/19     Montrell Zarate MD Assigned PCP   6/16/21     Phone: [phone] Fax: [phone]         1985 [facility] [address] [place] [address]    Margaret Mirza RN Transplant Coordinator Transplant  7/20/21      [facility] - RN Transplant Care Coordinator    Phone: [phone]         NADEGE Tripp  Transplant  7/20/21     [facility] - Transplant     Phone: [phone]         Kiah Verduzco ARMHS worker   7/20/21     [facility]    Phone: [phone]         Magnan, Tonya R, RN Home Care Nurse HOME HEALTH AGENCY ([facility] [place]  7/20/21     [facility]'s Cell: [phone]    [facility] Care - PCA Agency Personal Care Attendant PCA   7/20/21     4/hrs of daily PCA approved in November 2020    Phone: [phone] Fax: [phone]        Krystyna Financial Worker County Worker   7/20/21     [facility] - Insurance and County benefits    Phone: [phone]         NADEGE Lynch with [facility]    10/11/21     Transplant team    Phone: [phone]         irena Rangel RN with [facility]     10/11/21     Phone: [phone]                 My Care Plans  Self Management and Treatment Plan  Goals and (Comments)  Goals        General     Medical (pt-stated)      Notes - Note created  9/26/2021 11:29 PM by Lyudmila Glover RN       Goal Statement: I want to attend an Annual Physical exam in December, 2021 so that I may address my current health maintenance care gaps with my PCP.     Date Goal set: 9/16/2021  Barriers: language barrier  Strengths: motivated to attend PCP appt  Date to Achieve By: 12/31/2021  Patient expressed understanding of goal: Yes    Action steps to achieve this goal:  1. I will answer my phone when I am contacted to schedule my Annual Physical.  2. I will attend my Annual Physical appointment at [facility] on 12/30/2021 at 10:00am  3. I will schedule a follow up appointment with my PCP if it is recommended to do so while I am at the clinic.  4. I will follow up with [facility] regarding this goal at each outreach until it is completed.                  Action Plans on File:                       Advance Care Plans/Directives Type:    No data recorded    My Medical and Care Information  Problem List   Patient Active Problem List   Diagnosis     ESRD (end stage renal disease) on dialysis (H)     GERD (gastroesophageal reflux disease)     Polypharmacy     Slow transit constipation     Language barrier affecting health care     Cough with hemoptysis     Epigastric pain     Essential hypertension     Hypertensive urgency     ESRD (end stage renal disease) (H)     LVH (left ventricular hypertrophy)     Major depressive disorder with single episode, in partial remission (H)     Status post kidney transplant - [facility] 10/2021      Current Medications and Allergies:  See printed Medication Report.    Care Coordination Start Date: 7/6/2020   Frequency of Care Coordination: 6 weeks     Form Last Updated: 12/08/2021

## 2021-12-08 NOTE — PROGRESS NOTES
Clinic Care Coordination Contact     Situation: Patient chart reviewed by care coordinator.     Background: Pts initial assessment and enrollment to Care Coordination was 7/6/2020.   Patient centered goals were developed with participation from patient.  CC handed patient off to CHW for continued outreach every 30 days.      Assessment: CHW has been in contact with patient monthly. Patient has made progress to goals. Per chart review, patient continue to follow up with transplant team as recommended to detect any signs of infection rejection medication compliance/sife effects. No ED visits or hospitalization. Patient is scheduled preventative care with PCP on 12/30/21 then plan to move him to maintenance if there's no new concerns.    Goal:   Goals        Medical (pt-stated)         Goal Statement: I want to attend an Annual Physical exam in December, 2021 so that I may address my current health maintenance care gaps with my PCP.     Date Goal set: 9/16/2021  Barriers: language barrier  Strengths: motivated to attend PCP appt  Date to Achieve By: 12/31/2021  Patient expressed understanding of goal: Yes    Action steps to achieve this goal:  1. I will answer my phone when I am contacted to schedule my Annual Physical.  2. I will attend my Annual Physical appointment at [facility] on 12/30/2021 at 10:00am  3. I will schedule a follow up appointment with my PCP if it is recommended to do so while I am at the clinic.  4. I will follow up with CCC regarding this goal at each outreach until it is completed.                    Plan/Recommendations: CHW will involve CC as needed or if patient is ready to move to maintenance.  CC will continue to monitor progress to goals and CHW outreaches every 4 weeks.   CC RN will follow up in 6 weeks or sooner if needed.      Care Plan updated and mailed to patient: yes

## 2021-12-24 DIAGNOSIS — Z76.0 ENCOUNTER FOR MEDICATION REFILL: Primary | ICD-10-CM

## 2021-12-24 DIAGNOSIS — F32.2 CURRENT SEVERE EPISODE OF MAJOR DEPRESSIVE DISORDER WITHOUT PSYCHOTIC FEATURES WITHOUT PRIOR EPISODE (H): ICD-10-CM

## 2022-01-06 ENCOUNTER — PATIENT OUTREACH (OUTPATIENT)
Dept: CARE COORDINATION | Facility: CLINIC | Age: 37
End: 2022-01-06
Payer: MEDICARE

## 2022-01-06 NOTE — PROGRESS NOTES
Clinic Care Coordination Contact  [facility]/Voicemail    Clinical Data: Care Coordinator Outreach  Outreach attempted x 2.  Left message on patient's voicemail with call back information and requested return call.    Plan: Care Coordinator will do no further outreaches at this time. CHW will route encounter to [facility] for further review for disenrollment or additional outreach attempts.     Mirna Hernandez  [facility] Care Coordination  [facility]    Phone: [phone]

## 2022-01-14 ENCOUNTER — PATIENT OUTREACH (OUTPATIENT)
Dept: NURSING | Facility: CLINIC | Age: 37
End: 2022-01-14
Payer: MEDICARE

## 2022-01-14 NOTE — PROGRESS NOTES
Clinic Care Coordination Contact    Follow Up Progress Note      Assessment: CC RN spoke with patient and his [facility] worker - Kiah today. Patient has good support from his pca and his [facility] worker now. No new concerns reported. Patient was no show for his preventative appt on 12/30/21. Agreed to rescheduled. Assisted patient rescheduled appt with PCP for preventative appt on 3/1/2022 at 12pm. Kiah will remind patient of his appt. Patient has been doing well since his transplant in 10/2021. He has been follow up closely with his transplant team.     Outreach Frequency: 2 months    Plan: Okay to move patient to maintenance.

## 2022-01-19 ENCOUNTER — IMMUNIZATION (OUTPATIENT)
Dept: NURSING | Facility: CLINIC | Age: 37
End: 2022-01-19
Payer: MEDICARE

## 2022-01-19 ENCOUNTER — PATIENT OUTREACH (OUTPATIENT)
Dept: CARE COORDINATION | Facility: CLINIC | Age: 37
End: 2022-01-19

## 2022-01-19 PROCEDURE — 91306 COVID-19,PF,MODERNA (18+ YRS BOOSTER .25ML): CPT

## 2022-01-19 PROCEDURE — 0064A COVID-19,PF,MODERNA (18+ YRS BOOSTER .25ML): CPT

## 2022-01-19 NOTE — PROGRESS NOTES
Clinic Care Coordination Chart Review     Situation: Patient chart reviewed by CHW.     Background: Enrolled in CCC with no active goals.     Assessment: Patient and [facility] Worker Kiah spoke with the CCC RN on 1/14. Patient has good support from his pca and his [facility] worker now. No new concerns reported.      Plan/Recommendations: Patient moved to maintenance. CHW will not outreach as scheduled on 2/13.     CHW Next Follow-up: 2 month around 3/14 or as needed per clinician     CHW Plan: CHW to follow up in  2 months    Mirna Hernandez  [facility] Care Coordination  [facility]    Phone: [phone]

## 2022-03-16 ENCOUNTER — PATIENT OUTREACH (OUTPATIENT)
Dept: CARE COORDINATION | Facility: CLINIC | Age: 37
End: 2022-03-16
Payer: MEDICARE

## 2022-03-16 NOTE — PROGRESS NOTES
Clinic Care Coordination Contact  [facility]/Voicemail    Clinical Data: Care Coordinator Outreach  Outreach attempted x 1.  Left message on patient's voicemail with call back information and requested return call.    Plan: Care Coordinator will try to reach patient again in 10 business days.    Mirna Hernandez  [facility] Care Coordination  [facility]    Phone: [phone]

## 2022-03-18 ENCOUNTER — PATIENT OUTREACH (OUTPATIENT)
Dept: NURSING | Facility: CLINIC | Age: 37
End: 2022-03-18
Payer: MEDICARE

## 2022-03-18 NOTE — LETTER
[facility]  Patient Centered Plan of Care  About Me:        Patient Name:  Digna Gutierrez    YOB: 1985  Age:         36 year old   Leobardo MRN:    [id number] Telephone Information:  Home Phone [phone]   Mobile [phone]       Address:  [address] [place] [address] Email address:  No e-mail address on record      Emergency Contact(s)    Name Relationship Lgl Grd Work Phone Home Phone Mobile Phone           Primary language:  Malawian     needed? Yes   [employer]:  [phone] op. 1  Other communication barriers:English as a second language; Language barrier; Physical impairment; Caregiver    Preferred Method of Communication:     Current living arrangement: I live in a private home    Mobility Status/ Medical Equipment: Independent        Health Maintenance  Health Maintenance Reviewed: No data recorded    My Access Plan  Medical Emergency 911   Primary Clinic Line   - [phone]   24 Hour Appointment Line [phone] or  [phone]) (toll-free)   24 Hour Nurse Line [phone] (toll-free)   Preferred Urgent Care [facility] [phone]     Preferred Hospital [facility]  [phone]     Preferred Pharmacy [facility] - [place], [place] - [address] [place] [address]     Behavioral Health Crisis Line The National Suicide Prevention Lifeline at 1-751.515.9894 or 911             My Care Team Members  Patient Care Team       Relationship Specialty Notifications Start End    Montrell Zarate MD PCP - General   9/12/18     Phone: [phone] Fax: [phone]         1985 [facility] [address] [place] [address]    Lyudmila Glover RN Lead Care Coordinator Primary Care - CC Admissions 7/11/19     Montrell Zarate MD Assigned PCP   6/16/21     Phone: [phone] Fax: [phone]         1985 [facility] [address] [place] [address]    Margaret Mirza RN Transplant Coordinator Transplant  7/20/21      [facility] - RN Transplant Care Coordinator    Phone: [phone]         NADEGE Tripp  Transplant  7/20/21     [facility] - Transplant     Phone: [phone]         Kiah Verduzco ARMHS worker   7/20/21     [facility]    Phone: [phone]         Magnan, Tonya R, RN Home Care Nurse HOME HEALTH AGENCY ([facility]), ([place])  7/20/21     [facility] - [facility] Cell: [phone]    [facility] Care - PCA Agency Personal Care Attendant PCA   7/20/21     [facility] - PCa 4/hrs of daily PCA approved in November 2020    Phone: [phone] Fax: [phone]        Krystyna Financial Worker County Worker   7/20/21     [facility] - Insurance and County benefits    Phone: [phone]         NADEGE Lynch with [facility]    10/11/21     Transplant team    Phone: [phone]         irena Rangel RN with [facility]     10/11/21     Phone: [phone]         Mirna Hernandez Community Health Worker Primary Care - CC Admissions 1/6/22             My Care Plans  Self Management and Treatment Plan  Goals and (Comments)       Action Plans on File:                       Advance Care Plans/Directives Type:   No data recorded    My Medical and Care Information  Problem List   Patient Active Problem List   Diagnosis     ESRD (end stage renal disease) on dialysis (H)     GERD (gastroesophageal reflux disease)     Polypharmacy     Slow transit constipation     Language barrier affecting health care     Cough with hemoptysis     Epigastric pain     Essential hypertension     Hypertensive urgency     ESRD (end stage renal disease) (H)     LVH (left ventricular hypertrophy)     Major depressive disorder with single episode, in partial remission (H)     Status post kidney transplant - [facility] 10/2021      Current Medications and Allergies:  See printed Medication Report.    Care Coordination Start Date: 7/6/2020   Frequency of Care Coordination: 2 months     Form Last  Updated: 03/20/2022

## 2022-03-20 NOTE — PROGRESS NOTES
Care Coordination Clinician Chart Review     Situation: Patient chart reviewed by care coordinator.?     Background: Initial assessment and enrollment to Care Coordination was 7/6/2022.?? Patient centered goals were developed with participation from patient.? Lead CC handed patient off to CHW for continued outreach every 30 days.??     Assessment: Per chart review, patient outreach completed by CC CHW on 10/22/2021 .? Patient was moved to maintenance on 1/14/2022 after he completed all goals. Patient was mainly followed by CC RN for complex medical concerns and CC RN reaches out to CHW for assist as needed. Per chart review, patient's previous armhs worker left the company at the end of last month and not sure if patient was assign a new [employer] worker or patient declined a new [employer] worker. CHW to follow up on [employer] worker status with [facility]. Patient is overdue for preventative care. CC RN or CHW plans to assist when patient answer his phone. CC RN tried calling patient x3 and his phone seems to be not working.     Goals    N/a    Plan/Recommendations: The patient will continue working with Care Coordination to achieve above goal(s).? CHW will involve Lead CC as needed or if patient is ready to move to maintenance.? Lead CC will continue to monitor CHW s monthly outreaches and progress to goal(s) every 6 weeks.?     Will continue with maintenance the next 2 months. CHW to follow up on armhs service and assist with preventative care appt if patient is reachable.     Plan of Care updated and sent to patient: No

## 2022-03-22 ENCOUNTER — PATIENT OUTREACH (OUTPATIENT)
Dept: CARE COORDINATION | Facility: CLINIC | Age: 37
End: 2022-03-22
Payer: MEDICARE

## 2022-03-22 NOTE — PROGRESS NOTES
Clinic Care Coordination Chart Review     Situation: Patient chart reviewed by CHW.     Background: Enrolled in CCC with no active goals.     Assessment: Per 3/18/22 encounter, CHRIS Coreas attempted outreach x3. Patient was moved to maintenance on 1/14/22 after he complete all goals. CHW to assist with follow up on [facility] worker and preventative care appointment if able to reach patient in 2 months.       Plan/Recommendations: CHW will not outreach as scheduled on 3/23/22     CHW Next Follow-up: 2 months around 5/23     CHW Plan: CHW to follow in 2 months or as needed per clinician.    Mirna Hernandez  Clinic Care Coordination  [facility]    Phone: [phone]

## 2022-05-17 ENCOUNTER — PATIENT OUTREACH (OUTPATIENT)
Dept: CARE COORDINATION | Facility: CLINIC | Age: 37
End: 2022-05-17
Payer: MEDICARE

## 2022-05-17 NOTE — PROGRESS NOTES
Care Coordination Clinician Chart Review     Situation: Patient chart reviewed by care coordinator.?     Background: Initial assessment and enrollment to Care Coordination was 2020.?? Patient centered goals were developed with participation from patient.? Lead CC handed patient off to CHW for continued outreach every 30 days.??     Assessment: Per chart review, patient outreach completed by CC CHW on 10/22/2022 because mainly follow up by CCRN .? Patient is actively working to accomplish goal(s).? Patient's goal(s) remain(s) appropriate at this time.? Patient is due for updated Plan of Care.? Annual assessment will be due 2022. Patient was initially referral to [facility] to navigate and assist with coordinating his cares with transplant team due to language barrier. Patient was mainly followed by CCRN for complex medical issues. Patient then received -donor kidney RLQ on 10/2/2021. Since then patient has been doing really well and he's follow up closely with transplant team. Patient was last seen at [facility] on 2022. CCRN wasn't able to reach patient on 3/18/2022 and yesterday. Patient will be graduated from [facility] due to non-reachable.     Plan/Recommendations: Patient has been graduated from [facility] due to not reachable. Message sent to PCP.

## 2022-05-17 NOTE — LETTER
[facility] CARE COORDINATION    May 17, 2022    Digna Gutierrez  [address]   [place] [address]    Dear Digna,  Your Care Team congratulates you on your journey to maintain wellness. This document will help guide you on your journey to maintain a healthy lifestyle.  You can use this to help you overcome any barriers you may encounter.  If you should have any questions or concerns, you can contact the members of your Care Team or contact your Primary Care Clinic for assistance.     Health Maintenance  Health Maintenance Reviewed:      My Access Plan  Medical Emergency 911   Primary Clinic Line   - [phone]   24 Hour Appointment Line [phone] or  [phone]) (toll-free)   24 Hour Nurse Line [phone] (toll-free)   Preferred Urgent Care     Preferred Hospital     Preferred Pharmacy [facility] - [place], [place] - [address]     Behavioral Health Crisis Line The National Suicide Prevention Lifeline at 1-427.936.6932 or 911     My Care Team Members  Patient Care Team       Relationship Specialty Notifications Start End    Montrell Zarate MD PCP - General   9/12/18     Phone: [phone] Fax: [phone]         1985 [facility] [address] [place] [address]    Montrell Zarate MD Assigned PCP   6/16/21     Phone: [phone] Fax: [phone]         [address] [place] [address] [address] [place] [address]    Margaret Mirza RN Transplant Coordinator Transplant  7/20/21     [facility] - RN Transplant Care Coordinator    Phone: [phone]         NADEGE Tripp  Transplant  7/20/21     [facility] - Transplant     Phone: [phone]         Kiah Verduzco ARMHS worker   7/20/21     [facility]    Phone: [phone]         Magnan, Tonya R, RN Home Care Nurse HOME HEALTH AGENCY ([facility]), ([place])  7/20/21     [facility] Health - Miriam Cell: [phone]    [facility] Care - PCA Agency Personal Care Attendant PCA    7/20/21     [facility] Dara Ku - PCa 4/hrs of daily PCA approved in November 2020    Phone: [phone] Fax: [phone]        Krystyna Financial Worker County Worker   7/20/21     [facility] - Insurance and County benefits    Phone: [phone]         NADEGE Lynch with [facility]    10/11/21     Transplant team    Phone: [phone]         irena - ESTELA with [facility]     10/11/21     Phone: [phone]                    Goals        COMPLETED: Medical (pt-stated)       Goal Statement: I would like to be evaluated to get on active transplant list the next 6 months.     Date Goal set: 5/4/2021   Barriers: language barrier, lack of knowledge, lack of support   Strengths: Agrees to answer the phone, agrees to work on goals   Date to Achieve By: 11/4/2021   Patient expressed understanding of goal: Yes     Action steps to achieve this goal:   1. I attended my Transplant Evaluation appointment on 8/12/2021 at 1:30pm through [facility]. (Completed)  2. I will continue to work on my goals with [facility] workerKiah through [facility].  3. I will answer my phone when the [facility] RN follows up with me regarding this goal.    [facility] Transplant Program - [phone]  [address] [place] [address] [address] [address]    Note dated on 9/16/2021: Per patient, he will be on active transplant list.           COMPLETED: Medical (pt-stated)         Goal Statement: I want to attend an Annual Physical exam in December, 2021 so that I may address my current health maintenance care gaps with my PCP.     Date Goal set: 9/16/2021  Barriers: language barrier  Strengths: motivated to attend PCP appt  Date to Achieve By: 12/31/2021  Patient expressed understanding of goal: Yes    Action steps to achieve this goal:  1. I will answer my phone when I am contacted to schedule my Annual Physical.  2. I will attend my Annual Physical appointment at [facility] on 12/30/2021 at 10:00am  3. I will  schedule a follow up appointment with my PCP if it is recommended to do so while I am at the clinic.  4. I will follow up with CCC regarding this goal at each outreach until it is completed.                      It has been your Clinic Care Team's pleasure to work with you on your goals.    Regards,  Your Clinic Care Team

## 2022-06-17 NOTE — PROGRESS NOTES
Patient instructed on the use of the Ozempic pen.  Correct injection site selection and preparation reviewed.  Writer instructed patient on the storage, preparation and correct administration of medication using the Ozempic demonstration pen.  Patient returned demo and then demonstrated understanding by self administering the first dose while in office.  Medications potential side effects and black box warning reviewed.  Patient verbalizes understanding.     Lot #: [id number]  Exp Date:  24     You will see this patient on 12/17/2020.   2 medication changes to be initiated at next MSU-   decrease coreg from 6.25mg BID to 3.125mg BID   add melatonin 3mg 1-2 hours before bedtime.   Chart updated to reflect new orders- just wanted you to be aware in case the med bottles don't match up.   Thanks   Lashanda Mayberry RN

## 2022-07-19 NOTE — TELEPHONE ENCOUNTER
Hi Dr. Zarate,    I saw Digna Gutierrez for an OASIS re-cert. She is not able to manage the medications and would like to continue home care services for complex medication management and general health assessment 1x/EOW x10 weeks with 3 PRN.    Please respond with order    Thanks,    Yamila Gallego, RN  ACF     Complex Repair And A-T Advancement Flap Text: The defect edges were debeveled with a #15 scalpel blade.  The primary defect was closed partially with a complex linear closure.  Given the location of the remaining defect, shape of the defect and the proximity to free margins an A-T advancement flap was deemed most appropriate for complete closure of the defect.  Using a sterile surgical marker, an appropriate advancement flap was drawn incorporating the defect and placing the expected incisions within the relaxed skin tension lines where possible.    The area thus outlined was incised deep to adipose tissue with a #15 scalpel blade.  The skin margins were undermined to an appropriate distance in all directions utilizing iris scissors.

## 2023-06-21 ENCOUNTER — OFFICE VISIT (OUTPATIENT)
Dept: FAMILY MEDICINE | Facility: CLINIC | Age: 38
End: 2023-06-21
Payer: MEDICARE

## 2023-06-21 VITALS
BODY MASS INDEX: 28.4 KG/M2 | DIASTOLIC BLOOD PRESSURE: 84 MMHG | WEIGHT: 160.3 LBS | SYSTOLIC BLOOD PRESSURE: 130 MMHG | HEART RATE: 68 BPM | TEMPERATURE: 98.3 F | OXYGEN SATURATION: 98 % | RESPIRATION RATE: 16 BRPM | HEIGHT: 63 IN

## 2023-06-21 DIAGNOSIS — K62.5 RECTAL BLEEDING: ICD-10-CM

## 2023-06-21 DIAGNOSIS — K64.4 EXTERNAL HEMORRHOIDS: ICD-10-CM

## 2023-06-21 DIAGNOSIS — Z94.0 KIDNEY REPLACED BY TRANSPLANT: Primary | ICD-10-CM

## 2023-06-21 DIAGNOSIS — F32.2 CURRENT SEVERE EPISODE OF MAJOR DEPRESSIVE DISORDER WITHOUT PSYCHOTIC FEATURES WITHOUT PRIOR EPISODE (H): ICD-10-CM

## 2023-06-21 DIAGNOSIS — I10 ESSENTIAL HYPERTENSION: ICD-10-CM

## 2023-06-21 DIAGNOSIS — K21.9 GASTROESOPHAGEAL REFLUX DISEASE WITHOUT ESOPHAGITIS: ICD-10-CM

## 2023-06-21 LAB
ANION GAP SERPL CALCULATED.3IONS-SCNC: 11 MMOL/L (ref 7–15)
BUN SERPL-MCNC: 20.4 MG/DL (ref 6–20)
CALCIUM SERPL-MCNC: 10.6 MG/DL (ref 8.6–10)
CHLORIDE SERPL-SCNC: 107 MMOL/L (ref 98–107)
CREAT SERPL-MCNC: 1.7 MG/DL (ref 0.67–1.17)
DEPRECATED HCO3 PLAS-SCNC: 22 MMOL/L (ref 22–29)
ERYTHROCYTE [DISTWIDTH] IN BLOOD BY AUTOMATED COUNT: 13 % (ref 10–15)
GFR SERPL CREATININE-BSD FRML MDRD: 52 ML/MIN/1.73M2
GLUCOSE SERPL-MCNC: 88 MG/DL (ref 70–99)
HCT VFR BLD AUTO: 52.7 % (ref 40–53)
HGB BLD-MCNC: 16.8 G/DL (ref 13.3–17.7)
MCH RBC QN AUTO: 28.3 PG (ref 26.5–33)
MCHC RBC AUTO-ENTMCNC: 31.9 G/DL (ref 31.5–36.5)
MCV RBC AUTO: 89 FL (ref 78–100)
PLATELET # BLD AUTO: 170 10E3/UL (ref 150–450)
POTASSIUM SERPL-SCNC: 4.6 MMOL/L (ref 3.4–5.3)
RBC # BLD AUTO: 5.93 10E6/UL (ref 4.4–5.9)
SODIUM SERPL-SCNC: 140 MMOL/L (ref 136–145)
WBC # BLD AUTO: 6.4 10E3/UL (ref 4–11)

## 2023-06-21 PROCEDURE — 36415 COLL VENOUS BLD VENIPUNCTURE: CPT | Performed by: FAMILY MEDICINE

## 2023-06-21 PROCEDURE — 99215 OFFICE O/P EST HI 40 MIN: CPT | Performed by: FAMILY MEDICINE

## 2023-06-21 PROCEDURE — 80048 BASIC METABOLIC PNL TOTAL CA: CPT | Performed by: FAMILY MEDICINE

## 2023-06-21 PROCEDURE — 85027 COMPLETE CBC AUTOMATED: CPT | Performed by: FAMILY MEDICINE

## 2023-06-21 RX ORDER — HYDROCORTISONE 25 MG/G
CREAM TOPICAL 2 TIMES DAILY PRN
Qty: 30 G | Refills: 4 | Status: SHIPPED | OUTPATIENT
Start: 2023-06-21

## 2023-06-21 RX ORDER — CARVEDILOL 6.25 MG/1
12.5 TABLET ORAL 2 TIMES DAILY WITH MEALS
COMMUNITY
Start: 2023-06-21

## 2023-06-21 ASSESSMENT — PATIENT HEALTH QUESTIONNAIRE - PHQ9
10. IF YOU CHECKED OFF ANY PROBLEMS, HOW DIFFICULT HAVE THESE PROBLEMS MADE IT FOR YOU TO DO YOUR WORK, TAKE CARE OF THINGS AT HOME, OR GET ALONG WITH OTHER PEOPLE: VERY DIFFICULT
SUM OF ALL RESPONSES TO PHQ QUESTIONS 1-9: 12
SUM OF ALL RESPONSES TO PHQ QUESTIONS 1-9: 12

## 2023-06-21 NOTE — PROGRESS NOTES
"  Assessment & Plan     Kidney replaced by transplant  Patients managed by nephrology over at [facility] in sees the clinic regularly he has had routine nephrology labs done in May.  Medications reviewed with the patient.    Rectal bleeding    Rectal bleeding for 2 weeks checking BMP checking hemogram last hemogram reviewed with patient no overt rectal bleeding noted today.  Anoscope revealed no strictures or active bleeding  - BASIC METABOLIC PANEL; Future  - CBC with platelets; Future    Essential hypertension    Pressures well controlled patient was unaware that he is on carvedilol he now takes 12.5 mg twice daily    Current severe episode of major depressive disorder without psychotic features without prior episode (H)    He is on an SSRI he was unaware of this states that he is sleeping well and has no symptoms suggestive of depression or low mood    Gastroesophageal reflux disease without esophagitis    Patient did eat something spicy 2 weeks ago which precipitated his symptoms however he is on famotidine which he takes regularly.    External hemorrhoids    2 external hemorrhoids noted supportive guidelines given hydrocortisone prescribed follow-up with primary recommended in 2 months symptoms worsen he should call back immediately.  - hydrocortisone, Perianal, (HYDROCORTISONE) 2.5 % cream; Place rectally 2 times daily as needed for hemorrhoids             BMI:   Estimated body mass index is 28.19 kg/m  as calculated from the following:    Height as of this encounter: 1.606 m (5' 3.23\").    Weight as of this encounter: 72.7 kg (160 lb 4.8 oz).           Avery Mederos MD  [facility] JACOB Pierre is a 37 year old, presenting for the following health issues:  Abdominal Pain (Blood in stool x2weeks /)        6/21/2023    10:55 AM   Additional Questions   Roomed by jason voss   Accompanied by Self     HPI   37-year-old male here because of abdominal pain and rectal bleeding for 2 weeks.  " "Medical history significant for hypertension, he is a renal transplant recipient of the right kidney and is managed actively by [facility].  He goes regularly for lab tests he sees a nephrologist there.  He reports that he had some papaya salad 2 weeks ago and since then has found some abdominal pain occurring.  He says bowel movements are normal but he is scared to go to the bathroom because he is noticed rectal pain and bleeding for about 2 weeks.  No dizziness noted he has not had any chest pain or shortness of breath.  His appetite is normal.  He denies having headache or visual complaints he has not missed any work.          Review of Systems   Constitutional, HEENT, cardiovascular, pulmonary, GI, , musculoskeletal, neuro, skin, endocrine and psych systems are negative, except as otherwise noted.      Objective    /84 (BP Location: Right arm, Patient Position: Sitting, Cuff Size: Adult Regular)   Pulse 68   Temp 98.3  F (36.8  C) (Oral)   Resp 16   Ht 1.606 m (5' 3.23\")   Wt 72.7 kg (160 lb 4.8 oz)   SpO2 98%   BMI 28.19 kg/m    Body mass index is 28.19 kg/m .  Physical Exam   GENERAL: healthy, alert and no distress  EYES: Eyes grossly normal to inspection, PERRL and conjunctivae and sclerae normal  HENT: ear canals and TM's normal, nose and mouth without ulcers or lesions  NECK: no adenopathy, no asymmetry, masses, or scars and thyroid normal to palpation  RESP: lungs clear to auscultation - no rales, rhonchi or wheezes  CV: regular rate and rhythm, normal S1 S2, no S3 or S4, no murmur, click or rub, no peripheral edema and peripheral pulses strong  ABDOMEN: soft, nontender, no hepatosplenomegaly, no masses and bowel sounds normal  RECTAL: normal sphincter tone, no rectal masses, prostate normal size, smooth, nontender without nodules or masses 2 hemorrhoids noted at the 2 and 7 o'clock position  MS: no gross musculoskeletal defects noted, no edema  SKIN: no suspicious lesions or rashes  NEURO: Normal " strength and tone, mentation intact and speech normal  PSYCH: mentation appears normal, affect normal/bright          Total amount time spent in coordination of care of the patient including chart review was 43 minutes

## 2023-07-04 NOTE — TELEPHONE ENCOUNTER
Refill Approved    Rx renewed per Medication Renewal Policy. Medication was last renewed on 9/3/20.    Osei Reed, Care Connection Triage/Med Refill 3/31/2021     Requested Prescriptions   Pending Prescriptions Disp Refills     sertraline (ZOLOFT) 50 MG tablet [Pharmacy Med Name: SERTRALINE HCL 50 MG TABLET 50 Tablet] 30 tablet 6     Sig: TAKE 1 TABLET (50 MG TOTAL) BY MOUTH DAILY FOR DEPRESSION       SSRI Refill Protocol  Passed - 3/30/2021  8:26 AM        Passed - PCP or prescribing provider visit in last year     Last office visit with prescriber/PCP: 9/3/2020 Montrell Zarate MD OR same dept: 9/3/2020 Montrell Zarate MD OR same specialty: 9/3/2020 Montrell Zarate MD  Last physical: 12/29/2020 Last MTM visit: Visit date not found   Next visit within 3 mo: Visit date not found  Next physical within 3 mo: Visit date not found  Prescriber OR PCP: Montrell Zarate MD  Last diagnosis associated with med order: 1. Current severe episode of major depressive disorder without psychotic features without prior episode (H)  - sertraline (ZOLOFT) 50 MG tablet [Pharmacy Med Name: SERTRALINE HCL 50 MG TABLET 50 Tablet]; TAKE 1 TABLET (50 MG TOTAL) BY MOUTH DAILY FOR DEPRESSION  Dispense: 30 tablet; Refill: 6    If protocol passes may refill for 12 months if within 3 months of last provider visit (or a total of 15 months).                             abdomen soft, non-tender, and non-distended. Bowel sounds present. (+) RLQ abdominal tenderness, abdomen soft and non-distended. Bowel sounds present.

## 2023-10-16 ENCOUNTER — OFFICE VISIT (OUTPATIENT)
Dept: FAMILY MEDICINE | Facility: CLINIC | Age: 38
End: 2023-10-16
Payer: MEDICARE

## 2023-10-16 VITALS
SYSTOLIC BLOOD PRESSURE: 137 MMHG | DIASTOLIC BLOOD PRESSURE: 76 MMHG | HEIGHT: 63 IN | HEART RATE: 66 BPM | RESPIRATION RATE: 20 BRPM | TEMPERATURE: 98.6 F | OXYGEN SATURATION: 95 % | WEIGHT: 162 LBS | BODY MASS INDEX: 28.7 KG/M2

## 2023-10-16 DIAGNOSIS — R42 DIZZINESS: ICD-10-CM

## 2023-10-16 DIAGNOSIS — F32.4 MAJOR DEPRESSIVE DISORDER WITH SINGLE EPISODE, IN PARTIAL REMISSION (H): ICD-10-CM

## 2023-10-16 DIAGNOSIS — Z94.0 STATUS POST KIDNEY TRANSPLANT: ICD-10-CM

## 2023-10-16 DIAGNOSIS — Z23 NEED FOR VACCINATION: ICD-10-CM

## 2023-10-16 DIAGNOSIS — N18.6 ESRD (END STAGE RENAL DISEASE) (H): Primary | ICD-10-CM

## 2023-10-16 PROBLEM — Z99.2 ESRD (END STAGE RENAL DISEASE) ON DIALYSIS (H): Status: RESOLVED | Noted: 2018-05-17 | Resolved: 2023-10-16

## 2023-10-16 PROCEDURE — 90480 ADMN SARSCOV2 VAC 1/ONLY CMP: CPT | Performed by: FAMILY MEDICINE

## 2023-10-16 PROCEDURE — 90686 IIV4 VACC NO PRSV 0.5 ML IM: CPT | Performed by: FAMILY MEDICINE

## 2023-10-16 PROCEDURE — G0008 ADMIN INFLUENZA VIRUS VAC: HCPCS | Mod: 59 | Performed by: FAMILY MEDICINE

## 2023-10-16 PROCEDURE — 91320 SARSCV2 VAC 30MCG TRS-SUC IM: CPT | Performed by: FAMILY MEDICINE

## 2023-10-16 PROCEDURE — 99214 OFFICE O/P EST MOD 30 MIN: CPT | Mod: 25 | Performed by: FAMILY MEDICINE

## 2023-10-16 ASSESSMENT — PATIENT HEALTH QUESTIONNAIRE - PHQ9
SUM OF ALL RESPONSES TO PHQ QUESTIONS 1-9: 18
SUM OF ALL RESPONSES TO PHQ QUESTIONS 1-9: 18
10. IF YOU CHECKED OFF ANY PROBLEMS, HOW DIFFICULT HAVE THESE PROBLEMS MADE IT FOR YOU TO DO YOUR WORK, TAKE CARE OF THINGS AT HOME, OR GET ALONG WITH OTHER PEOPLE: SOMEWHAT DIFFICULT

## 2023-10-16 NOTE — PROGRESS NOTES
ASSESMENT AND PLAN:  Diagnoses and all orders for this visit:  ESRD (end stage renal disease) (H), Status post kidney transplant  Patient congratulated on his successful kidney transplant.  Patient will continue to follow-up with his transplant/nephrology clinic as directed.  Labs are managed there.  I did review his most recent lab results in [facility] everywhere-creatinine from last week 1.43.  Dizziness  Likely a medication side effect from his nephrology/transplant medications.  Counseled the patient that he needs to address this with his nephrology/transplant team, I do not want him to make any medication modifications without their permission.  Counseling done with the help of a professional , patient agreeable with the plan.  Major depressive disorder with single episode, in partial remission (H24)  As detailed below on the PHQ-9 the patient does have some worsening indicators on his depression symptomatology.  Detailed counseling done today with the patient with the help of a professional .  We talked about increasing his low-dose sertraline up to a more moderate dose.  However, patient declines that at this time, he feels like overall he is doing pretty well and wishes not to increase medication dose at this time.  Follow-up if worsening right away, otherwise routine follow-up in 6 months.  Need for vaccination  Immunization review and counseling done with the patient.  -     INFLUENZA VACCINE >6 MONTHS (AFLURIA/FLUZONE)  -     COVID-19 12+ (2023-24) (PFIZER)      Reviewed the risks and benefits of the treatment plan with the patient and/or caregiver and we discussed indications for routine and emergent follow-up.        SUBJECTIVE: Since I last saw the patient he did complete a kidney transplant.  [place] [facility] [place]  Patient reports that his multidrug regimen to that he has been taking since his transplant does make him feel dizzy  intermittently.  Dizziness is mild to moderate in severity.  No associated chest pain or shortness of breath or palpitations.  Patient has also had some exacerbation of his depression compared to last time I saw him.    Past Medical History:   Diagnosis Date    Chronic kidney disease     ESRD (end stage renal disease) (H)     Solitary kidney      Patient Active Problem List   Diagnosis    GERD (gastroesophageal reflux disease)    Polypharmacy    Slow transit constipation    Language barrier affecting health care    Cough with hemoptysis    Epigastric pain    Essential hypertension    Hypertensive urgency    ESRD (end stage renal disease) (H)    LVH (left ventricular hypertrophy)    Major depressive disorder with single episode, in partial remission (H24)    [place] [facility] [place]     Current Outpatient Medications   Medication Sig Dispense Refill    carvedilol (COREG) 6.25 MG tablet Take 2 tablets (12.5 mg) by mouth 2 times daily (with meals)      famotidine (PEPCID) 20 MG tablet [FAMOTIDINE (PEPCID) 20 MG TABLET] Take 1 tablet (20 mg total) by mouth at bedtime. 90 tablet 3    sertraline (ZOLOFT) 50 MG tablet TAKE 1 TABLET (50 MG TOTAL) BY MOUTH DAILY FOR DEPRESSION 90 tablet 3    calcium, as carbonate, (TUMS) 200 mg calcium (500 mg) chewable tablet [CALCIUM, AS CARBONATE, (TUMS) 200 MG CALCIUM (500 MG) CHEWABLE TABLET] Chew 2 tablets (400 mg total) 2 (two) times a day as needed for heartburn. 120 tablet 11    cinacalcet (SENSIPAR) 90 MG tablet [CINACALCET (SENSIPAR) 90 MG TABLET] Take 90 mg by mouth daily. Indications: increased calcium in blood due to parathyroid cancer      folic acid/vit B complex and C (DIALYVITE ORAL) [FOLIC ACID/VIT B COMPLEX AND C (DIALYVITE ORAL)] Take by mouth.      hydrocortisone, Perianal, (HYDROCORTISONE) 2.5 % cream Place rectally 2 times daily as needed for hemorrhoids 30 g 4    meclizine (ANTIVERT) 12.5 mg tablet [MECLIZINE (ANTIVERT) 12.5 MG TABLET] Take 12.5  "mg by mouth 3 (three) times a day as needed for dizziness. Take 1 tab three times daily as needed.  Indications: motion sickness, sensation of spinning or whirling      MEDICATION CANNOT BE REORDERED - PLEASE MANUALLY REORDER AND DISCONTINUE THE OLD ORDER [CALCIUM ACETATE 667 MG TAB] Take 2 tablets by mouth 3 (three) times a day with meals. Take 2 tabs daily by mouth with meals. Per signed order from [facility] Dr. Johnson  Indications: low amount of calcium in the blood      melatonin 3 mg cap [MELATONIN 3 MG CAP] Take 3-6 mg by mouth at bedtime as needed. 60 capsule 11    SENEXON-S 8.6-50 mg tablet [SENEXON-S 8.6-50 MG TABLET] TAKE 1-2 PILLS BY MOUTH EVERY DAY AS NEEDED FOR CONSTIPATION 60 tablet 11     History   Smoking Status    Never   Smokeless Tobacco    Never       OBJECTICE: /76   Pulse 66   Temp 98.6  F (37  C) (Oral)   Resp 20   Ht 1.606 m (5' 3.23\")   Wt 73.5 kg (162 lb)   SpO2 95%   BMI 28.49 kg/m       PATIENT HEALTH QUESTIONNAIRE-9 (PHQ - 9)    Over the last 2 weeks, how often have you been bothered by any of the following problems?    1. Little interest or pleasure in doing things -  Nearly every day   2. Feeling down, depressed, or hopeless -  Nearly every day   3. Trouble falling or staying asleep, or sleeping too much - More than half the days   4. Feeling tired or having little energy -  Several days   5. Poor appetite or overeating -  Several days   6. Feeling bad about yourself - or that you are a failure or have let yourself or your family down -  Several days   7. Trouble concentrating on things, such as reading the newspaper or watching television - More than half the days   8. Moving or speaking so slowly that other people could have noticed? Or the opposite - being so fidgety or restless that you have been moving around a lot more than usual Nearly every day   9. Thoughts that you would be better off dead or of hurting  yourself in some way More than half the days "   Total Score: 18     If you checked off any problems, how difficult have these problems made it for you to do your work, take care of things at home, or get along with other people?      Developed by Carlos Hernandez, Hortencia Membreno, Kemal Royal and colleagues, with an educational lisa from [employer] No permission required to reproduce, translate, display or distribute. permission required to reproduce, translate, display or distribute.      GEN-alert, appropriate, in no apparent distress   CV-regular rate and rhythm with no murmur   RESP-lungs clear to auscultation   EXTREM-no pitting edema of the ankles   Psychiatric-appearance is well-groomed, speech of normal fluency and rate, affect is normal, mood is somewhat depressed, thought content negative for any suicidal intention or planning, thought processing negative for paranoid or delusional thinking.      Montrell Zarate MD

## 2024-04-18 ENCOUNTER — OFFICE VISIT (OUTPATIENT)
Dept: FAMILY MEDICINE | Facility: CLINIC | Age: 39
End: 2024-04-18
Payer: MEDICARE

## 2024-04-18 VITALS
SYSTOLIC BLOOD PRESSURE: 138 MMHG | WEIGHT: 173 LBS | HEIGHT: 63 IN | DIASTOLIC BLOOD PRESSURE: 72 MMHG | OXYGEN SATURATION: 96 % | BODY MASS INDEX: 30.65 KG/M2 | RESPIRATION RATE: 16 BRPM | HEART RATE: 67 BPM | TEMPERATURE: 98.5 F

## 2024-04-18 DIAGNOSIS — F32.4 MAJOR DEPRESSIVE DISORDER WITH SINGLE EPISODE, IN PARTIAL REMISSION (H): ICD-10-CM

## 2024-04-18 DIAGNOSIS — N18.6 ESRD (END STAGE RENAL DISEASE) (H): Primary | ICD-10-CM

## 2024-04-18 PROCEDURE — 99213 OFFICE O/P EST LOW 20 MIN: CPT | Performed by: FAMILY MEDICINE

## 2024-04-18 RX ORDER — ATORVASTATIN CALCIUM 10 MG/1
10 TABLET, FILM COATED ORAL DAILY
COMMUNITY
Start: 2023-06-09

## 2024-04-18 RX ORDER — ASPIRIN 81 MG/1
1 TABLET ORAL DAILY
COMMUNITY
Start: 2023-06-09

## 2024-04-18 RX ORDER — PREDNISONE 5 MG/1
5 TABLET ORAL DAILY
COMMUNITY
Start: 2024-03-06

## 2024-04-18 RX ORDER — TACROLIMUS 1 MG/1
CAPSULE ORAL
COMMUNITY
Start: 2023-12-13

## 2024-04-18 RX ORDER — SODIUM BICARBONATE 650 MG/1
650 TABLET ORAL
COMMUNITY
Start: 2023-12-19

## 2024-04-18 RX ORDER — ISONIAZID 300 MG/1
1 TABLET ORAL DAILY
COMMUNITY
Start: 2023-11-30

## 2024-04-18 RX ORDER — MYCOPHENOLATE MOFETIL 250 MG/1
250 CAPSULE ORAL
COMMUNITY
Start: 2023-11-14

## 2024-04-18 RX ORDER — SULFAMETHOXAZOLE AND TRIMETHOPRIM 400; 80 MG/1; MG/1
1 TABLET ORAL DAILY
COMMUNITY
Start: 2023-05-15 | End: 2024-05-20

## 2024-04-18 ASSESSMENT — PATIENT HEALTH QUESTIONNAIRE - PHQ9
SUM OF ALL RESPONSES TO PHQ QUESTIONS 1-9: 0
SUM OF ALL RESPONSES TO PHQ QUESTIONS 1-9: 0
10. IF YOU CHECKED OFF ANY PROBLEMS, HOW DIFFICULT HAVE THESE PROBLEMS MADE IT FOR YOU TO DO YOUR WORK, TAKE CARE OF THINGS AT HOME, OR GET ALONG WITH OTHER PEOPLE: NOT DIFFICULT AT ALL

## 2024-04-18 NOTE — PROGRESS NOTES
ASSESMENT AND PLAN:  Diagnoses and all orders for this visit:  ESRD (end stage renal disease) (H) status post transplant  Patient will follow-up with the transplant/nephrology clinic as directed.  Patient reports that he did get some immunizations there but in care everywhere it is unclear which immunizations were given, he is going to get a printed immunization record from his transplant clinic and then bring it with him to his next visit.  Major depressive disorder with single episode, in partial remission (H24)  Resolved.  Counseling done today with the patient.  He has self discontinued sertraline so I removed that from his medication list.  Observation.    Reviewed the risks and benefits of the treatment plan with the patient and/or caregiver and we discussed indications for routine and emergent follow-up.        SUBJECTIVE: Patient in for follow-up.  He reports that about a month ago he stopped taking his sertraline.  He did so because he was no longer having any problems with depression.  Since stopping it he has not noticed any worsening or any new problems.  Overall, he feels like he does not need the medication.  He is sleeping very well.  He continues to follow-up with his transplant clinic.    Past Medical History:   Diagnosis Date    Chronic kidney disease     ESRD (end stage renal disease) (H)     Solitary kidney      Patient Active Problem List   Diagnosis    GERD (gastroesophageal reflux disease)    Polypharmacy    Slow transit constipation    Language barrier affecting health care    Cough with hemoptysis    Epigastric pain    Essential hypertension    Hypertensive urgency    ESRD (end stage renal disease) (H)    LVH (left ventricular hypertrophy)    Major depressive disorder with single episode, in partial remission (H24)    [place] [facility] [place]     Current Outpatient Medications   Medication Sig Dispense Refill    aspirin 81 MG EC tablet Take 1 tablet by mouth daily       atorvastatin (LIPITOR) 10 MG tablet Take 10 mg by mouth daily      calcium, as carbonate, (TUMS) 200 mg calcium (500 mg) chewable tablet [CALCIUM, AS CARBONATE, (TUMS) 200 MG CALCIUM (500 MG) CHEWABLE TABLET] Chew 2 tablets (400 mg total) 2 (two) times a day as needed for heartburn. 120 tablet 11    carvedilol (COREG) 6.25 MG tablet Take 2 tablets (12.5 mg) by mouth 2 times daily (with meals)      cinacalcet (SENSIPAR) 90 MG tablet [CINACALCET (SENSIPAR) 90 MG TABLET] Take 90 mg by mouth daily. 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 90 tablet 3    folic acid/vit B complex and C (DIALYVITE ORAL) [FOLIC ACID/VIT B COMPLEX AND C (DIALYVITE ORAL)] Take by mouth.      hydrocortisone, Perianal, (HYDROCORTISONE) 2.5 % cream Place rectally 2 times daily as needed for hemorrhoids 30 g 4    isoniazid (NYDRAZID) 300 MG tablet Take 1 tablet by mouth daily      meclizine (ANTIVERT) 12.5 mg tablet [MECLIZINE (ANTIVERT) 12.5 MG TABLET] Take 12.5 mg by mouth 3 (three) times a day as needed for dizziness. Take 1 tab three times daily as needed.  Indications: motion sickness, sensation of spinning or whirling      MEDICATION CANNOT BE REORDERED - PLEASE MANUALLY REORDER AND DISCONTINUE THE OLD ORDER [CALCIUM ACETATE 667 MG TAB] Take 2 tablets by mouth 3 (three) times a day with meals. Take 2 tabs daily by mouth with meals. Per signed order from [facility] Dr. Johnson  Indications: low amount of calcium in the blood      melatonin 3 mg cap [MELATONIN 3 MG CAP] Take 3-6 mg by mouth at bedtime as needed. 60 capsule 11    mycophenolate (GENERIC EQUIVALENT) 250 MG capsule Take 250 mg by mouth      predniSONE (DELTASONE) 5 MG tablet Take 5 mg by mouth daily      SENEXON-S 8.6-50 mg tablet [SENEXON-S 8.6-50 MG TABLET] TAKE 1-2 PILLS BY MOUTH EVERY DAY AS NEEDED FOR CONSTIPATION 60 tablet  "11    sodium bicarbonate 650 MG tablet Take 650 mg by mouth      sulfamethoxazole-trimethoprim (BACTRIM) 400-80 MG tablet Take 1 tablet by mouth daily      tacrolimus (GENERIC EQUIVALENT) 1 MG capsule Take 2 capsules (2 mg) by mouth every morning AND 1 capsule (1 mg) each evening.       History   Smoking Status    Never   Smokeless Tobacco    Never       OBJECTICE: /72 (BP Location: Right arm, Patient Position: Sitting, Cuff Size: Adult Regular)   Pulse 67   Temp 98.5  F (36.9  C) (Oral)   Resp 16   Ht 1.606 m (5' 3.23\")   Wt 78.5 kg (173 lb)   SpO2 96%   BMI 30.42 kg/m       No results found for this or any previous visit (from the past 24 hour(s)).     CV-regular rate and rhythm   RESP-lungs clear   PATIENT HEALTH QUESTIONNAIRE-9 (PHQ - 9)    Over the last 2 weeks, how often have you been bothered by any of the following problems?    1. Little interest or pleasure in doing things -  Not at all   2. Feeling down, depressed, or hopeless -  Not at all   3. Trouble falling or staying asleep, or sleeping too much - Not at all   4. Feeling tired or having little energy -  Not at all   5. Poor appetite or overeating -  Not at all   6. Feeling bad about yourself - or that you are a failure or have let yourself or your family down -  Not at all   7. Trouble concentrating on things, such as reading the newspaper or watching television - Not at all   8. Moving or speaking so slowly that other people could have noticed? Or the opposite - being so fidgety or restless that you have been moving around a lot more than usual Not at all   9. Thoughts that you would be better off dead or of hurting  yourself in some way Not at all   Total Score: 0     If you checked off any problems, how difficult have these problems made it for you to do your work, take care of things at home, or get along with other people?      Developed by Carlos Hernandez, Hortencia Membreno, Kemal Royal and colleagues, with an educational " lisa from [employer] No permission required to reproduce, translate, display or distribute. permission required to reproduce, translate, display or distribute.      Montrell Zarate MD         Signed Electronically by: Montrell Zarate MD

## 2024-08-06 ENCOUNTER — APPOINTMENT (OUTPATIENT)
Dept: CT IMAGING | Facility: HOSPITAL | Age: 39
End: 2024-08-06
Attending: EMERGENCY MEDICINE
Payer: MEDICARE

## 2024-08-06 ENCOUNTER — APPOINTMENT (OUTPATIENT)
Dept: RADIOLOGY | Facility: HOSPITAL | Age: 39
End: 2024-08-06
Attending: EMERGENCY MEDICINE
Payer: MEDICARE

## 2024-08-06 ENCOUNTER — NURSE TRIAGE (OUTPATIENT)
Dept: FAMILY MEDICINE | Facility: CLINIC | Age: 39
End: 2024-08-06
Payer: COMMERCIAL

## 2024-08-06 ENCOUNTER — HOSPITAL ENCOUNTER (EMERGENCY)
Facility: HOSPITAL | Age: 39
Discharge: HOME | End: 2024-08-06
Attending: EMERGENCY MEDICINE | Admitting: EMERGENCY MEDICINE
Payer: MEDICARE

## 2024-08-06 VITALS
DIASTOLIC BLOOD PRESSURE: 66 MMHG | TEMPERATURE: 98.4 F | HEART RATE: 66 BPM | OXYGEN SATURATION: 95 % | BODY MASS INDEX: 30.65 KG/M2 | RESPIRATION RATE: 20 BRPM | HEIGHT: 63 IN | SYSTOLIC BLOOD PRESSURE: 132 MMHG | WEIGHT: 173 LBS

## 2024-08-06 DIAGNOSIS — R07.9 CHEST PAIN, UNSPECIFIED TYPE: ICD-10-CM

## 2024-08-06 DIAGNOSIS — J18.9 PNEUMONIA DUE TO INFECTIOUS ORGANISM, UNSPECIFIED LATERALITY, UNSPECIFIED PART OF LUNG: ICD-10-CM

## 2024-08-06 LAB
ALBUMIN SERPL BCG-MCNC: 4.7 G/DL (ref 3.5–5.2)
ALBUMIN UR-MCNC: 30 MG/DL
ALP SERPL-CCNC: 132 U/L (ref 40–150)
ALT SERPL W P-5'-P-CCNC: 90 U/L (ref 0–70)
AMORPH CRY #/AREA URNS HPF: ABNORMAL /HPF
ANION GAP SERPL CALCULATED.3IONS-SCNC: 9 MMOL/L (ref 7–15)
APPEARANCE UR: CLEAR
AST SERPL W P-5'-P-CCNC: 72 U/L (ref 0–45)
BASOPHILS # BLD AUTO: 0 10E3/UL (ref 0–0.2)
BASOPHILS NFR BLD AUTO: 0 %
BILIRUB DIRECT SERPL-MCNC: 0.21 MG/DL (ref 0–0.3)
BILIRUB SERPL-MCNC: 1.3 MG/DL
BILIRUB UR QL STRIP: NEGATIVE
BUN SERPL-MCNC: 17.3 MG/DL (ref 6–20)
CALCIUM SERPL-MCNC: 10.4 MG/DL (ref 8.8–10.4)
CHLORIDE SERPL-SCNC: 104 MMOL/L (ref 98–107)
COLOR UR AUTO: ABNORMAL
CREAT SERPL-MCNC: 1.7 MG/DL (ref 0.67–1.17)
D DIMER PPP FEU-MCNC: 0.46 UG/ML FEU (ref 0–0.5)
EGFRCR SERPLBLD CKD-EPI 2021: 52 ML/MIN/1.73M2
EOSINOPHIL # BLD AUTO: 0.1 10E3/UL (ref 0–0.7)
EOSINOPHIL NFR BLD AUTO: 1 %
ERYTHROCYTE [DISTWIDTH] IN BLOOD BY AUTOMATED COUNT: 13.2 % (ref 10–15)
FLUAV RNA SPEC QL NAA+PROBE: NEGATIVE
FLUBV RNA RESP QL NAA+PROBE: NEGATIVE
GLUCOSE SERPL-MCNC: 118 MG/DL (ref 70–99)
GLUCOSE UR STRIP-MCNC: NEGATIVE MG/DL
HCO3 SERPL-SCNC: 27 MMOL/L (ref 22–29)
HCT VFR BLD AUTO: 51.5 % (ref 40–53)
HGB BLD-MCNC: 16.9 G/DL (ref 13.3–17.7)
HGB UR QL STRIP: ABNORMAL
HOLD SPECIMEN: NORMAL
HOLD SPECIMEN: NORMAL
IMM GRANULOCYTES # BLD: 0.1 10E3/UL
IMM GRANULOCYTES NFR BLD: 1 %
KETONES UR STRIP-MCNC: NEGATIVE MG/DL
LEUKOCYTE ESTERASE UR QL STRIP: NEGATIVE
LIPASE SERPL-CCNC: 36 U/L (ref 13–60)
LYMPHOCYTES # BLD AUTO: 1 10E3/UL (ref 0.8–5.3)
LYMPHOCYTES NFR BLD AUTO: 12 %
MCH RBC QN AUTO: 28.3 PG (ref 26.5–33)
MCHC RBC AUTO-ENTMCNC: 32.8 G/DL (ref 31.5–36.5)
MCV RBC AUTO: 86 FL (ref 78–100)
MONOCYTES # BLD AUTO: 0.7 10E3/UL (ref 0–1.3)
MONOCYTES NFR BLD AUTO: 9 %
NEUTROPHILS # BLD AUTO: 6.4 10E3/UL (ref 1.6–8.3)
NEUTROPHILS NFR BLD AUTO: 78 %
NITRATE UR QL: NEGATIVE
NRBC # BLD AUTO: 0 10E3/UL
NRBC BLD AUTO-RTO: 0 /100
PH UR STRIP: 6.5 [PH] (ref 5–7)
PLATELET # BLD AUTO: 158 10E3/UL (ref 150–450)
POTASSIUM SERPL-SCNC: 4.3 MMOL/L (ref 3.4–5.3)
PROT SERPL-MCNC: 7.8 G/DL (ref 6.4–8.3)
RBC # BLD AUTO: 5.98 10E6/UL (ref 4.4–5.9)
RBC URINE: 1 /HPF
RSV RNA SPEC NAA+PROBE: NEGATIVE
SARS-COV-2 RNA RESP QL NAA+PROBE: NEGATIVE
SODIUM SERPL-SCNC: 140 MMOL/L (ref 135–145)
SP GR UR STRIP: 1.02 (ref 1–1.03)
TROPONIN T SERPL HS-MCNC: 11 NG/L
TROPONIN T SERPL HS-MCNC: 12 NG/L
UROBILINOGEN UR STRIP-MCNC: <2 MG/DL
WBC # BLD AUTO: 8.3 10E3/UL (ref 4–11)
WBC URINE: <1 /HPF

## 2024-08-06 PROCEDURE — 85379 FIBRIN DEGRADATION QUANT: CPT | Performed by: EMERGENCY MEDICINE

## 2024-08-06 PROCEDURE — 80076 HEPATIC FUNCTION PANEL: CPT | Performed by: EMERGENCY MEDICINE

## 2024-08-06 PROCEDURE — 99285 EMERGENCY DEPT VISIT HI MDM: CPT | Mod: 25

## 2024-08-06 PROCEDURE — 96374 THER/PROPH/DIAG INJ IV PUSH: CPT

## 2024-08-06 PROCEDURE — 84484 ASSAY OF TROPONIN QUANT: CPT | Performed by: STUDENT IN AN ORGANIZED HEALTH CARE EDUCATION/TRAINING PROGRAM

## 2024-08-06 PROCEDURE — 81003 URINALYSIS AUTO W/O SCOPE: CPT | Performed by: EMERGENCY MEDICINE

## 2024-08-06 PROCEDURE — 82374 ASSAY BLOOD CARBON DIOXIDE: CPT | Performed by: STUDENT IN AN ORGANIZED HEALTH CARE EDUCATION/TRAINING PROGRAM

## 2024-08-06 PROCEDURE — 71045 X-RAY EXAM CHEST 1 VIEW: CPT

## 2024-08-06 PROCEDURE — 87637 SARSCOV2&INF A&B&RSV AMP PRB: CPT | Performed by: EMERGENCY MEDICINE

## 2024-08-06 PROCEDURE — 85025 COMPLETE CBC W/AUTO DIFF WBC: CPT | Performed by: STUDENT IN AN ORGANIZED HEALTH CARE EDUCATION/TRAINING PROGRAM

## 2024-08-06 PROCEDURE — 36415 COLL VENOUS BLD VENIPUNCTURE: CPT | Performed by: EMERGENCY MEDICINE

## 2024-08-06 PROCEDURE — 74176 CT ABD & PELVIS W/O CONTRAST: CPT | Mod: MG

## 2024-08-06 PROCEDURE — 250N000011 HC RX IP 250 OP 636: Performed by: EMERGENCY MEDICINE

## 2024-08-06 PROCEDURE — 83690 ASSAY OF LIPASE: CPT | Performed by: EMERGENCY MEDICINE

## 2024-08-06 PROCEDURE — 84484 ASSAY OF TROPONIN QUANT: CPT | Performed by: EMERGENCY MEDICINE

## 2024-08-06 PROCEDURE — 93005 ELECTROCARDIOGRAM TRACING: CPT | Performed by: STUDENT IN AN ORGANIZED HEALTH CARE EDUCATION/TRAINING PROGRAM

## 2024-08-06 RX ORDER — DOXYCYCLINE 100 MG/1
100 CAPSULE ORAL 2 TIMES DAILY
Qty: 14 CAPSULE | Refills: 0 | Status: SHIPPED | OUTPATIENT
Start: 2024-08-06 | End: 2024-08-13

## 2024-08-06 RX ORDER — KETOROLAC TROMETHAMINE 15 MG/ML
15 INJECTION, SOLUTION INTRAMUSCULAR; INTRAVENOUS ONCE
Status: COMPLETED | OUTPATIENT
Start: 2024-08-06 | End: 2024-08-06

## 2024-08-06 RX ADMIN — KETOROLAC TROMETHAMINE 15 MG: 15 INJECTION, SOLUTION INTRAMUSCULAR; INTRAVENOUS at 19:04

## 2024-08-06 ASSESSMENT — ACTIVITIES OF DAILY LIVING (ADL)
ADLS_ACUITY_SCORE: 35

## 2024-08-06 NOTE — ED TRIAGE NOTES
PT presents with chest pain, shortness of breath and pain in lower back. This started on Monday.  Pt also states loss of apatite and dizziness.     Triage Assessment (Adult)       Row Name 08/06/24 1539          Triage Assessment    Airway WDL WDL        Respiratory WDL    Respiratory WDL X;rhythm/pattern     Rhythm/Pattern, Respiratory shortness of breath        Skin Circulation/Temperature WDL    Skin Circulation/Temperature WDL WDL        Cardiac WDL    Cardiac WDL X;chest pain        Chest Pain Assessment    Chest Pain Location midsternal     Chest Pain Intervention 12-lead ECG obtained

## 2024-08-06 NOTE — TELEPHONE ENCOUNTER
Writer returned call and patient stated that he had a ride on the way to take him to the clinic. Writer clarified that he should be going to the hospital. Patient stated that yes they can take him to the hospital.      Keyona Aguillon RN  [facility]

## 2024-08-06 NOTE — TELEPHONE ENCOUNTER
"Incoming call with  already on the line.    Whenever I breathe \"I feel pain in my heart\"  Pain started Sunday night   Pain has been constant since   Rates chest pain 9/10  Hard to breathe   He is at work currently and will be going home  He does not have anyone to come drive him to the ED  Encouraged patient to then call 911. He stated he could do so in english.   This would be faster than attempting to get his work address and patch in the  to the 911 call.   Writer will call back to ensure patient contacted 911.       Reason for Disposition   SEVERE chest pain    Additional Information   Negative: Followed an injury to chest   Negative: SEVERE difficulty breathing (e.g., struggling for each breath, speaks in single words)   Negative: Difficult to awaken or acting confused (e.g., disoriented, slurred speech)   Negative: Shock suspected (e.g., cold/pale/clammy skin, too weak to stand, low BP, rapid pulse)   Negative: Passed out (i.e., lost consciousness, collapsed and was not responding)   Negative: Chest pain lasting longer than 5 minutes and ANY of the following:         Pain is crushing, pressure-like, or heavy         Over 44 years old          Over 30 years old and one cardiac risk factor (e.g diabetes, high blood pressure, high cholesterol, smoker, or family history of heart disease)         History of heart disease (e.g. angina, heart attack, heart failure, bypass surgery, takes nitroglycerin)   Negative: Heart beating < 50 beats per minute OR > 140 beats per minute   Negative: Visible sweat on face or sweat dripping down face   Negative: Sounds like a life-threatening emergency to the triager    Protocols used: Chest Pain-A-[place]    Keyona Aguillon RN  [facility]  "

## 2024-08-06 NOTE — ED PROVIDER NOTES
EMERGENCY DEPARTMENT ENCOUNTER      NAME: Ignacio Santana  AGE: 38 year old male  YOB: 1985  MRN: [id number]  EVALUATION DATE & TIME: No admission date for patient encounter.    PCP: Montrell Zarate    ED PROVIDER: Montrell Spencer D.O.      Chief Complaint   Patient presents with    Shortness of Breath    Chest Pain       FINAL IMPRESSION:  1. Chest pain, unspecified type    2. Pneumonia due to infectious organism, unspecified laterality, unspecified part of lung        ED COURSE & MEDICAL DECISION MAKIN:22 PM I met with the patient to gather history and to perform my initial exam. I discussed the plan for care while in the Emergency Department.  6:58 PM I rechecked on the patient and updated them on lab results and the plan.         Pertinent Labs & Imaging studies reviewed. (See chart for details)  38 year old male presents to the Emergency Department for evaluation of chest pain.  Left-sided chest pain which was somewhat respirophasic in nature, as well as left-sided abdominal pain.  Patient was afebrile, and was otherwise largely unremarkable on exam.  Initial differential did include PE, dissection, pneumothorax, ACS, intra-abdominal pathology.  He had no right upper quadrant tenderness on exam to suggest biliary pathology, despite his LFTs being slightly abnormal.  Do not believe these LFTs represent an emergent process at this time.  D-dimer was negative, doubt PE at this time.  Imaging today was negative, other than suggestive of a mild pneumonia.  I do not believe this to be the cause of the patient's pain, but with his symptoms I am concerned enough to we will discharge on antibiotics.  2 troponins were both negative with an EKG that did not show any evidence of ischemia or significant arrhythmia.  At this time, as I do not see other obvious emergent process in his abdomen or in his chest, I believe the patient can be safely discharged home with plan for outpatient follow-up with cardiology  and primary care provider.  Patient was comfortable with this plan.  Return precautions were discussed.    Medical Decision Making  Obtained supplemental history:Supplemental history obtained?: Documented in chart  Reviewed external records: External records reviewed?: Documented in chart  Care impacted by chronic illness:Chronic Kidney Disease and Hypertension  Care significantly affected by social determinants of health:Access to Medical Care  Did you consider but not order tests?: Work up considered but not performed and documented in chart, if applicable  Did you interpret images independently?: Independent interpretation of ECG and images noted in documentation, when applicable.  Consultation discussion with other provider:Did you involve another provider (consultant, , pharmacy, etc.)?: No  Discharge. I prescribed additional prescription strength medication(s) as charted. See documentation for any additional details.    At the conclusion of the encounter I discussed the results of all of the tests and the disposition. The questions were answered. The patient or family acknowledged understanding and was agreeable with the care plan.        HPI    Patient information was obtained from: patient    Use of : N/A       Ignacio Santana is a 38 year old male with pertinent history of hypertension, ESRD, LVH, MDD, and GERD who presents chest pain.    On Sunday night 8/4/24, the patient started to have left chest pain, light-headedness, dry heaving, and left abdominal pain.  Does report that the pain seems to increase with deep breaths.      Denies fever, dysuria, diarrhea, sore throat, medications, history of hypertension, diabetes, tobacco use, alcohol use, or any other complaints at this time.      PAST MEDICAL HISTORY:  Past Medical History:   Diagnosis Date    Chronic kidney disease     ESRD (end stage renal disease) (H)     Solitary kidney        PAST SURGICAL HISTORY:  Past Surgical History:   Procedure  Laterality Date    CREATE FISTULA ARTERIOVENOUS UPPER EXTREMITY Left     CREATE FISTULA ARTERIOVENOUS UPPER EXTREMITY Left 3/26/2020    Procedure: LEFT ARM TRANSPOSED CEPHALIC VEIN ACCESS AND LIGATION PSEUDOANEURYSM FOREARM;  Surgeon: Deshawn Gonzalez MD;  Location: [facility];  Service: General    IR DECLOT AV FISTULA  7/12/2018         CURRENT MEDICATIONS:    No current facility-administered medications for this encounter.     Current Outpatient Medications   Medication Sig Dispense Refill    doxycycline hyclate (VIBRAMYCIN) 100 MG capsule Take 1 capsule (100 mg) by mouth 2 times daily for 7 days 14 capsule 0    aspirin 81 MG EC tablet Take 1 tablet by mouth daily      atorvastatin (LIPITOR) 10 MG tablet Take 10 mg by mouth daily      calcium, as carbonate, (TUMS) 200 mg calcium (500 mg) chewable tablet [CALCIUM, AS CARBONATE, (TUMS) 200 MG CALCIUM (500 MG) CHEWABLE TABLET] Chew 2 tablets (400 mg total) 2 (two) times a day as needed for heartburn. 120 tablet 11    carvedilol (COREG) 6.25 MG tablet Take 2 tablets (12.5 mg) by mouth 2 times daily (with meals)      cinacalcet (SENSIPAR) 90 MG tablet [CINACALCET (SENSIPAR) 90 MG TABLET] Take 90 mg by mouth daily. 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 90 tablet 3    folic acid/vit B complex and C (DIALYVITE ORAL) [FOLIC ACID/VIT B COMPLEX AND C (DIALYVITE ORAL)] Take by mouth.      hydrocortisone, Perianal, (HYDROCORTISONE) 2.5 % cream Place rectally 2 times daily as needed for hemorrhoids 30 g 4    isoniazid (NYDRAZID) 300 MG tablet Take 1 tablet by mouth daily      meclizine (ANTIVERT) 12.5 mg tablet [MECLIZINE (ANTIVERT) 12.5 MG TABLET] Take 12.5 mg by mouth 3 (three) times a day as needed for dizziness. Take 1 tab three times daily as needed.  Indications: motion sickness, sensation of spinning or whirling       MEDICATION CANNOT BE REORDERED - PLEASE MANUALLY REORDER AND DISCONTINUE THE OLD ORDER [CALCIUM ACETATE 667 MG TAB] Take 2 tablets by mouth 3 (three) times a day with meals. Take 2 tabs daily by mouth with meals. Per signed order from [facility] Dr. Johnson  Indications: low amount of calcium in the blood      melatonin 3 mg cap [MELATONIN 3 MG CAP] Take 3-6 mg by mouth at bedtime as needed. 60 capsule 11    mycophenolate (GENERIC EQUIVALENT) 250 MG capsule Take 250 mg by mouth      predniSONE (DELTASONE) 5 MG tablet Take 5 mg by mouth daily      SENEXON-S 8.6-50 mg tablet [SENEXON-S 8.6-50 MG TABLET] TAKE 1-2 PILLS BY MOUTH EVERY DAY AS NEEDED FOR CONSTIPATION 60 tablet 11    sodium bicarbonate 650 MG tablet Take 650 mg by mouth      tacrolimus (GENERIC EQUIVALENT) 1 MG capsule Take 2 capsules (2 mg) by mouth every morning AND 1 capsule (1 mg) each evening.           ALLERGIES:  No Known Allergies    FAMILY HISTORY:  No family history on file.    SOCIAL HISTORY:  Social History     Socioeconomic History    Marital status: Single   Tobacco Use    Smoking status: Never     Passive exposure: Never    Smokeless tobacco: Never    Tobacco comments:     no passive exposure   Vaping Use    Vaping status: Never Used   Substance and Sexual Activity    Alcohol use: No    Drug use: No    Sexual activity: Not Currently     Social Determinants of Health     Financial Resource Strain: Low Risk  (10/16/2023)    Financial Resource Strain     Within the past 12 months, have you or your family members you live with been unable to get utilities (heat, electricity) when it was really needed?: No   Food Insecurity: High Risk (10/16/2023)    Food Insecurity     Within the past 12 months, did you worry that your food would run out before you got money to buy more?: Yes     Within the past 12 months, did the food you bought just not last and you didn t have money to get more?: No   Transportation Needs: Low Risk  (10/16/2023)     "Transportation Needs     Within the past 12 months, has lack of transportation kept you from medical appointments, getting your medicines, non-medical meetings or appointments, work, or from getting things that you need?: No   Interpersonal Safety: Low Risk  (4/18/2024)    Interpersonal Safety     Do you feel physically and emotionally safe where you currently live?: Yes     Within the past 12 months, have you been hit, slapped, kicked or otherwise physically hurt by someone?: No     Within the past 12 months, have you been humiliated or emotionally abused in other ways by your partner or ex-partner?: No   Housing Stability: Low Risk  (10/16/2023)    Housing Stability     Do you have housing? : Yes     Are you worried about losing your housing?: No       VITALS:  Patient Vitals for the past 24 hrs:   BP Temp Temp src Pulse Resp SpO2 Height Weight   08/06/24 2014 -- -- -- 66 20 95 % -- --   08/06/24 2004 132/66 -- -- 64 28 95 % -- --   08/06/24 1948 136/70 -- -- 67 20 95 % -- --   08/06/24 1923 135/82 98.4  F (36.9  C) Oral 69 21 95 % -- --   08/06/24 1852 137/78 -- -- 67 22 95 % -- --   08/06/24 1806 -- -- -- 69 22 96 % -- --   08/06/24 1742 136/88 -- -- 68 -- -- -- --   08/06/24 1730 -- -- -- 68 22 -- -- --   08/06/24 1537 (!) 147/90 97.7  F (36.5  C) -- 85 16 94 % 1.6 m (5' 3\") 78.5 kg (173 lb)       PHYSICAL EXAM    VITAL SIGNS: /66   Pulse 66   Temp 98.4  F (36.9  C) (Oral)   Resp 20   Ht 1.6 m (5' 3\")   Wt 78.5 kg (173 lb)   SpO2 95%   BMI 30.65 kg/m      General Appearance: Well-appearing, well-nourished, no acute distress  Head:  Normocephalic, without obvious abnormality, atraumatic  Eyes:  PERRL, conjunctiva/corneas clear, EOM's intact,  ENT:  Lips, mucosa, and tongue normal, membranes are moist without pallor  Neck:  Normal ROM, symmetrical, trachea midline   Cardio:  Regular rate and rhythm, no murmur, rub or gallop, 2+ pulses symmetric in all extremities  Pulm:  Clear to auscultation " bilaterally, respirations unlabored,  Abdomen:  Soft, left abdominal tenderness, no rebound or guarding.  Musculoskeletal: Full ROM, no edema, no cyanosis, good ROM of major joints  Integument:  Warm, Dry, No erythema, No rash.    Neurologic:  Alert & oriented.  No focal deficits appreciated.    Psychiatric:  Affect normal, Judgment normal, Mood normal.      LABS  Results for orders placed or performed during the hospital encounter of 08/06/24 (from the past 24 hour(s))   [place] Draw *Canceled*    Narrative    The following orders were created for panel order [place] Draw.  Procedure                               Abnormality         Status                     ---------                               -----------         ------                       Please view results for these tests on the individual orders.   CBC with Platelets & Differential    Narrative    The following orders were created for panel order CBC with Platelets & Differential.  Procedure                               Abnormality         Status                     ---------                               -----------         ------                     CBC with platelets and d...[[id number]]  Abnormal            Final result                 Please view results for these tests on the individual orders.   Basic metabolic panel   Result Value Ref Range    Sodium 140 135 - 145 mmol/L    Potassium 4.3 3.4 - 5.3 mmol/L    Chloride 104 98 - 107 mmol/L    Carbon Dioxide (CO2) 27 22 - 29 mmol/L    Anion Gap 9 7 - 15 mmol/L    Urea Nitrogen 17.3 6.0 - 20.0 mg/dL    Creatinine 1.70 (H) 0.67 - 1.17 mg/dL    GFR Estimate 52 (L) >60 mL/min/1.73m2    Calcium 10.4 8.8 - 10.4 mg/dL    Glucose 118 (H) 70 - 99 mg/dL   Troponin T, High Sensitivity   Result Value Ref Range    Troponin T, High Sensitivity 12 <=22 ng/L   [place] Draw    Narrative    The following orders were created for panel order [place] Draw.  Procedure                               Abnormality          Status                     ---------                               -----------         ------                     Extra Red Top Tube[[id number]]                               Final result               Extra Green Top (Lithium...[[id number]]                                                 Extra Purple Top Tube[[id number]]                            Final result                 Please view results for these tests on the individual orders.   CBC with platelets and differential   Result Value Ref Range    WBC Count 8.3 4.0 - 11.0 10e3/uL    RBC Count 5.98 (H) 4.40 - 5.90 10e6/uL    Hemoglobin 16.9 13.3 - 17.7 g/dL    Hematocrit 51.5 40.0 - 53.0 %    MCV 86 78 - 100 fL    MCH 28.3 26.5 - 33.0 pg    MCHC 32.8 31.5 - 36.5 g/dL    RDW 13.2 10.0 - 15.0 %    Platelet Count 158 150 - 450 10e3/uL    % Neutrophils 78 %    % Lymphocytes 12 %    % Monocytes 9 %    % Eosinophils 1 %    % Basophils 0 %    % Immature Granulocytes 1 %    NRBCs per 100 WBC 0 <1 /100    Absolute Neutrophils 6.4 1.6 - 8.3 10e3/uL    Absolute Lymphocytes 1.0 0.8 - 5.3 10e3/uL    Absolute Monocytes 0.7 0.0 - 1.3 10e3/uL    Absolute Eosinophils 0.1 0.0 - 0.7 10e3/uL    Absolute Basophils 0.0 0.0 - 0.2 10e3/uL    Absolute Immature Granulocytes 0.1 <=0.4 10e3/uL    Absolute NRBCs 0.0 10e3/uL   Extra Red Top Tube   Result Value Ref Range    Hold Specimen JI    Extra Purple Top Tube   Result Value Ref Range    Hold Specimen JI    D dimer quantitative   Result Value Ref Range    D-Dimer Quantitative 0.46 0.00 - 0.50 ug/mL FEU    Narrative    This D-dimer assay is intended for use in conjunction with a clinical pretest probability assessment model to exclude pulmonary embolism (PE) and deep venous thrombosis (DVT) in outpatients suspected of PE or DVT. The cut-off value is 0.50 ug/mL FEU.   Hepatic function panel   Result Value Ref Range    Protein Total 7.8 6.4 - 8.3 g/dL    Albumin 4.7 3.5 - 5.2 g/dL    Bilirubin Total 1.3 (H) <=1.2 mg/dL    Alkaline  Phosphatase 132 40 - 150 U/L    AST 72 (H) 0 - 45 U/L    ALT 90 (H) 0 - 70 U/L    Bilirubin Direct 0.21 0.00 - 0.30 mg/dL   Lipase   Result Value Ref Range    Lipase 36 13 - 60 U/L   [place] Draw *Canceled*    Narrative    The following orders were created for panel order [place] Draw.  Procedure                               Abnormality         Status                     ---------                               -----------         ------                     Extra Blue Top Tube[[id number]]                                                           Please view results for these tests on the individual orders.   UA with Microscopic reflex to Culture    Specimen: Urine, Midstream   Result Value Ref Range    Color Urine Light Yellow Colorless, Straw, Light Yellow, Yellow    Appearance Urine Clear Clear    Glucose Urine Negative Negative mg/dL    Bilirubin Urine Negative Negative    Ketones Urine Negative Negative mg/dL    Specific Gravity Urine 1.017 1.001 - 1.030    Blood Urine 0.06 mg/dL (A) Negative    pH Urine 6.5 5.0 - 7.0    Protein Albumin Urine 30 (A) Negative mg/dL    Urobilinogen Urine <2.0 <2.0 mg/dL    Nitrite Urine Negative Negative    Leukocyte Esterase Urine Negative Negative    Amorphous Crystals Urine Few (A) None Seen /HPF    RBC Urine 1 <=2 /HPF    WBC Urine <1 <=5 /HPF    Narrative    Urine Culture not indicated   XR Chest Port 1 View    Narrative    EXAM: XR CHEST PORT 1 VIEW  LOCATION: [facility]  DATE: 8/6/2024    INDICATION: Chest pain.  COMPARISON: 8/15/2019      Impression    IMPRESSION:     Hypoinflated lungs. Mild left basilar opacity, either atelectasis or infiltrate. Otherwise, no focal airspace disease.    No pleural effusion or pneumothorax.    Stable borderline cardiomegaly.   CT Abdomen Pelvis w/o Contrast    Narrative    EXAM: CT ABDOMEN PELVIS W/O CONTRAST  LOCATION: [facility]  DATE: 8/6/2024    INDICATION: Left abdominal and flank  pain.  COMPARISON: None.  TECHNIQUE: CT scan of the abdomen and pelvis was performed without IV contrast. Multiplanar reformats were obtained. Dose reduction techniques were used.  CONTRAST: None.    FINDINGS:   LOWER CHEST: Mild cardiomegaly. Minimal coronary artery calcification.    HEPATOBILIARY: Normal.    PANCREAS: Normal.    SPLEEN: Normal.    ADRENAL GLANDS: Normal.    KIDNEYS/BLADDER: There is significant atrophy with associated parenchymal thinning of both kidneys. There are low-density lesions seen in both kidneys typical for benign cysts with no follow-up recommended. There is a transplant kidney seen in the right   abdomen/pelvis. No stones/obstructive changes identified in the native or transplanted kidney. The bladder is normal.    BOWEL: Normal to include the appendix.    LYMPH NODES: Normal.    VASCULATURE: Minimal calcification with no aneurysm.    PELVIC ORGANS: Normal.    MUSCULOSKELETAL: Minute fatty umbilical hernia with no associated bowel or inflammation. Minimal hypertrophic changes in the spine.      Impression    IMPRESSION:   1.  No acute findings.    2.  Significant atrophy of both kidneys. Benign cysts in both kidneys with no follow-up recommended. No stones or obstructive changes seen in the urinary tract.    3.  Transplant kidney on the right appears normal.    4.  Mild cardiomegaly.    5.  Minimal coronary artery calcification.     Symptomatic Influenza A/B, RSV, & SARS-CoV2 PCR (COVID-19) Nose    Specimen: Nose; Swab   Result Value Ref Range    Influenza A PCR Negative Negative    Influenza B PCR Negative Negative    RSV PCR Negative Negative    SARS CoV2 PCR Negative Negative    Narrative    Testing was performed using the Xpert Xpress CoV2/Flu/RSV Assay on the [employer] GeneXpert Instrument. This test should be ordered for the detection of SARS-CoV-2, influenza, and RSV viruses in individuals who meet clinical and/or epidemiological criteria. Test performance is unknown in  asymptomatic patients. This test is for in vitro diagnostic use under the FDA EUA for laboratories certified under CLIA to perform high or moderate complexity testing. This test has not been FDA cleared or approved. A negative result does not rule out the presence of PCR inhibitors in the specimen or target RNA in concentration below the limit of detection for the assay. If only one viral target is positive but coinfection with multiple targets is suspected, the sample should be re-tested with another FDA cleared, approved, or authorized test, if coinfection would change clinical management. This test was validated by the [facility]. These laboratories are certified under the Clinical Laboratory Improvement Amendments of 1988 (CLIA-88) as qualified to perform high complexity laboratory testing.   Troponin T, High Sensitivity (now)   Result Value Ref Range    Troponin T, High Sensitivity 11 <=22 ng/L         RADIOLOGY  CT Abdomen Pelvis w/o Contrast   Final Result   IMPRESSION:    1.  No acute findings.      2.  Significant atrophy of both kidneys. Benign cysts in both kidneys with no follow-up recommended. No stones or obstructive changes seen in the urinary tract.      3.  Transplant kidney on the right appears normal.      4.  Mild cardiomegaly.      5.  Minimal coronary artery calcification.         XR Chest Port 1 View   Final Result   IMPRESSION:       Hypoinflated lungs. Mild left basilar opacity, either atelectasis or infiltrate. Otherwise, no focal airspace disease.      No pleural effusion or pneumothorax.      Stable borderline cardiomegaly.             EKG:    Rate: 77 bpm  Rhythm: Normal Sinus Rhythm  Axis: Normal  Interval: Normal  Conduction: Normal  QRS: Normal  ST: Normal  T-wave: Inverted inferior leads  QT: Not prolonged  Comparison EKG: T wave inversions in the inferior leads, no significant change when compared to 20 August 2019  Impression:  No acute ischemic change   I have  independently reviewed and interpreted today's EKG, pending Cardiologist read            MEDICATIONS GIVEN IN THE EMERGENCY:  Medications   ketorolac (TORADOL) injection 15 mg (15 mg Intravenous $Given 8/6/24 1904)       NEW PRESCRIPTIONS STARTED AT TODAY'S ER VISIT  Discharge Medication List as of 8/6/2024  8:24 PM        START taking these medications    Details   doxycycline hyclate (VIBRAMYCIN) 100 MG capsule Take 1 capsule (100 mg) by mouth 2 times daily for 7 days, Disp-14 capsule, R-0, Local Print              I, Pedrito Sanchez, am serving as a scribe to document services personally performed by Montrell Spencer D.O., based on my observations and the provider's statements to me.  I, Montrell Spencer D.O., attest that Pedrito Sanchez is acting in a scribe capacity, has observed my performance of the services and has documented them in accordance with my direction.     Montrell Spencer D.O.  Emergency Medicine  [facility] EMERGENCY DEPARTMENT  [address] [place] [id number]  [phone]  Dept: [phone]       Montrell Spencer,   08/06/24 2378

## 2024-08-06 NOTE — Clinical Note
Ignacio Santana was seen and treated in our emergency department on 8/6/2024.  He may return to work on 08/08/2024.       If you have any questions or concerns, please don't hesitate to call.      Montrell Spencer, DO

## 2024-08-09 LAB
ATRIAL RATE - MUSE: 77 BPM
DIASTOLIC BLOOD PRESSURE - MUSE: NORMAL MMHG
INTERPRETATION ECG - MUSE: NORMAL
P AXIS - MUSE: 52 DEGREES
PR INTERVAL - MUSE: 162 MS
QRS DURATION - MUSE: 98 MS
QT - MUSE: 360 MS
QTC - MUSE: 407 MS
R AXIS - MUSE: 49 DEGREES
SYSTOLIC BLOOD PRESSURE - MUSE: NORMAL MMHG
T AXIS - MUSE: -22 DEGREES
VENTRICULAR RATE- MUSE: 77 BPM

## 2025-03-11 ENCOUNTER — TELEPHONE (OUTPATIENT)
Dept: FAMILY MEDICINE | Facility: CLINIC | Age: 40
End: 2025-03-11
Payer: COMMERCIAL

## 2025-03-11 NOTE — TELEPHONE ENCOUNTER
Pt returned call, TC scheduled appt.   Yearly Physical   6/11/2025 10:30 AM (Arrive by 10:10 AM) Montrell Zarate MD [facility]

## 2025-03-11 NOTE — TELEPHONE ENCOUNTER
Patient Quality Outreach    Patient is due for the following:   Depression  -  PHQ-9 needed and Depression follow-up visit  IVD  -  LDL (Fasting)  Physical Annual Wellness Visit      Topic Date Due    Zoster (Shingles) Vaccine (1 of 2) Never done    Hepatitis B Vaccine (1 of 3 - 19+ 3-dose series) Never done    Flu Vaccine (1) 09/01/2024    COVID-19 Vaccine (5 - 2024-25 season) 09/01/2024       Action(s) Taken:   Schedule a Annual Wellness Visit    Type of outreach:    Phone, left message for patient/parent to call back.    Questions for provider review:    None           Deepika Fitzpatrick MA

## 2025-07-15 ENCOUNTER — OFFICE VISIT (OUTPATIENT)
Dept: FAMILY MEDICINE | Facility: CLINIC | Age: 40
End: 2025-07-15
Payer: COMMERCIAL

## 2025-07-15 VITALS
HEART RATE: 96 BPM | DIASTOLIC BLOOD PRESSURE: 64 MMHG | HEIGHT: 63 IN | SYSTOLIC BLOOD PRESSURE: 128 MMHG | RESPIRATION RATE: 16 BRPM | TEMPERATURE: 98.2 F | BODY MASS INDEX: 30.37 KG/M2 | WEIGHT: 171.4 LBS | OXYGEN SATURATION: 96 %

## 2025-07-15 DIAGNOSIS — K64.4 EXTERNAL HEMORRHOIDS: Primary | ICD-10-CM

## 2025-07-15 PROBLEM — N18.6 ESRD (END STAGE RENAL DISEASE) (H): Status: RESOLVED | Noted: 2019-08-19 | Resolved: 2025-07-15

## 2025-07-15 PROCEDURE — 3078F DIAST BP <80 MM HG: CPT | Performed by: FAMILY MEDICINE

## 2025-07-15 PROCEDURE — 99213 OFFICE O/P EST LOW 20 MIN: CPT | Performed by: FAMILY MEDICINE

## 2025-07-15 PROCEDURE — 3074F SYST BP LT 130 MM HG: CPT | Performed by: FAMILY MEDICINE

## 2025-07-15 RX ORDER — HYDROCORTISONE 25 MG/G
CREAM TOPICAL 2 TIMES DAILY PRN
Qty: 30 G | Refills: 4 | Status: SHIPPED | OUTPATIENT
Start: 2025-07-15

## 2025-07-15 ASSESSMENT — PATIENT HEALTH QUESTIONNAIRE - PHQ9
10. IF YOU CHECKED OFF ANY PROBLEMS, HOW DIFFICULT HAVE THESE PROBLEMS MADE IT FOR YOU TO DO YOUR WORK, TAKE CARE OF THINGS AT HOME, OR GET ALONG WITH OTHER PEOPLE: NOT DIFFICULT AT ALL
SUM OF ALL RESPONSES TO PHQ QUESTIONS 1-9: 0
SUM OF ALL RESPONSES TO PHQ QUESTIONS 1-9: 0

## 2025-07-15 NOTE — PROGRESS NOTES
"  {PROVIDER CHARTING PREFERENCE:883048}    Subjective   Ignacio is a 39 year old, presenting for the following health issues:  Cyst (Around the anus )      7/15/2025    10:10 AM   Additional Questions   Roomed by jason voss   Accompanied by Self     History of Present Illness       Reason for visit:  Cyst   He is taking medications regularly.        {MA/LPN/RN Pre-Provider Visit Orders- hCG/UA/Strep (Optional):440283}  {SUPERLIST (Optional):789787}  {additonal problems for provider to add (Optional):378720}    {ROS Picklists (Optional):729830}      Objective    BP (!) 145/79   Pulse 96   Temp 98.2  F (36.8  C) (Oral)   Resp 16   Ht 1.605 m (5' 3.19\")   Wt 77.7 kg (171 lb 6.4 oz)   SpO2 96%   BMI 30.18 kg/m    Body mass index is 30.18 kg/m .  Physical Exam   {Exam List (Optional):961500}    {Diagnostic Test Results (Optional):712357}        Signed Electronically by: Dalton Marin MD, MD  {Email feedback regarding this note to [email or web address]   :290907}  " "(NYDRAZID) 300 MG tablet Take 1 tablet by mouth daily      meclizine (ANTIVERT) 12.5 mg tablet [MECLIZINE (ANTIVERT) 12.5 MG TABLET] Take 12.5 mg by mouth 3 (three) times a day as needed for dizziness. Take 1 tab three times daily as needed.  Indications: motion sickness, sensation of spinning or whirling      MEDICATION CANNOT BE REORDERED - PLEASE MANUALLY REORDER AND DISCONTINUE THE OLD ORDER [CALCIUM ACETATE 667 MG TAB] Take 2 tablets by mouth 3 (three) times a day with meals. Take 2 tabs daily by mouth with meals. Per signed order from [facility] Dr. Johnson  Indications: low amount of calcium in the blood      melatonin 3 mg cap [MELATONIN 3 MG CAP] Take 3-6 mg by mouth at bedtime as needed. 60 capsule 11    mycophenolate (GENERIC EQUIVALENT) 250 MG capsule Take 250 mg by mouth      predniSONE (DELTASONE) 5 MG tablet Take 5 mg by mouth daily      SENEXON-S 8.6-50 mg tablet [SENEXON-S 8.6-50 MG TABLET] TAKE 1-2 PILLS BY MOUTH EVERY DAY AS NEEDED FOR CONSTIPATION 60 tablet 11    sodium bicarbonate 650 MG tablet Take 650 mg by mouth      tacrolimus (GENERIC EQUIVALENT) 1 MG capsule Take 2 capsules (2 mg) by mouth every morning AND 1 capsule (1 mg) each evening.                     Objective    /64   Pulse 96   Temp 98.2  F (36.8  C) (Oral)   Resp 16   Ht 1.605 m (5' 3.19\")   Wt 77.7 kg (171 lb 6.4 oz)   SpO2 96%   BMI 30.18 kg/m    Body mass index is 30.18 kg/m .  Physical Exam     Heart normal  Lungs normal  Inspection of anus: 2 cm x 1 cm hemorrhoid on left, not thrombosed.  3 tiny hemorrhoids on right          Signed Electronically by: Dalton Marin MD    "

## 2025-07-26 ENCOUNTER — TELEPHONE (OUTPATIENT)
Dept: FAMILY MEDICINE | Facility: CLINIC | Age: 40
End: 2025-07-26

## 2025-07-28 ENCOUNTER — PATIENT OUTREACH (OUTPATIENT)
Dept: CARE COORDINATION | Facility: CLINIC | Age: 40
End: 2025-07-28
Payer: COMMERCIAL